# Patient Record
Sex: FEMALE | Race: BLACK OR AFRICAN AMERICAN | NOT HISPANIC OR LATINO | URBAN - METROPOLITAN AREA
[De-identification: names, ages, dates, MRNs, and addresses within clinical notes are randomized per-mention and may not be internally consistent; named-entity substitution may affect disease eponyms.]

---

## 2018-12-21 ENCOUNTER — EMERGENCY (EMERGENCY)
Facility: HOSPITAL | Age: 48
LOS: 1 days | Discharge: HOME | End: 2018-12-21
Attending: EMERGENCY MEDICINE | Admitting: EMERGENCY MEDICINE
Payer: COMMERCIAL

## 2018-12-21 VITALS
TEMPERATURE: 98 F | OXYGEN SATURATION: 98 % | WEIGHT: 235.01 LBS | RESPIRATION RATE: 18 BRPM | HEART RATE: 71 BPM | SYSTOLIC BLOOD PRESSURE: 106 MMHG | DIASTOLIC BLOOD PRESSURE: 74 MMHG

## 2018-12-21 VITALS
WEIGHT: 235.01 LBS | DIASTOLIC BLOOD PRESSURE: 100 MMHG | OXYGEN SATURATION: 95 % | HEART RATE: 68 BPM | RESPIRATION RATE: 18 BRPM | TEMPERATURE: 99 F | SYSTOLIC BLOOD PRESSURE: 148 MMHG

## 2018-12-21 VITALS
OXYGEN SATURATION: 97 % | DIASTOLIC BLOOD PRESSURE: 81 MMHG | SYSTOLIC BLOOD PRESSURE: 111 MMHG | RESPIRATION RATE: 17 BRPM | HEART RATE: 73 BPM

## 2018-12-21 DIAGNOSIS — R51 HEADACHE: ICD-10-CM

## 2018-12-21 DIAGNOSIS — Z88.0 ALLERGY STATUS TO PENICILLIN: ICD-10-CM

## 2018-12-21 DIAGNOSIS — Z88.2 ALLERGY STATUS TO SULFONAMIDES: ICD-10-CM

## 2018-12-21 DIAGNOSIS — R22.0 LOCALIZED SWELLING, MASS AND LUMP, HEAD: ICD-10-CM

## 2018-12-21 LAB
ALBUMIN SERPL ELPH-MCNC: 3.3 G/DL — LOW (ref 3.4–5)
ALP SERPL-CCNC: 57 U/L — SIGNIFICANT CHANGE UP (ref 40–120)
ALT FLD-CCNC: 20 U/L — SIGNIFICANT CHANGE UP (ref 12–42)
ANION GAP SERPL CALC-SCNC: 8 MMOL/L — LOW (ref 9–16)
APTT BLD: 26.8 SEC — LOW (ref 27.5–36.3)
AST SERPL-CCNC: 15 U/L — SIGNIFICANT CHANGE UP (ref 15–37)
BASOPHILS NFR BLD AUTO: 0.5 % — SIGNIFICANT CHANGE UP (ref 0–2)
BILIRUB SERPL-MCNC: 0.3 MG/DL — SIGNIFICANT CHANGE UP (ref 0.2–1.2)
BUN SERPL-MCNC: 18 MG/DL — SIGNIFICANT CHANGE UP (ref 7–23)
CALCIUM SERPL-MCNC: 9.6 MG/DL — SIGNIFICANT CHANGE UP (ref 8.5–10.5)
CHLORIDE SERPL-SCNC: 108 MMOL/L — SIGNIFICANT CHANGE UP (ref 96–108)
CO2 SERPL-SCNC: 25 MMOL/L — SIGNIFICANT CHANGE UP (ref 22–31)
CREAT SERPL-MCNC: 0.89 MG/DL — SIGNIFICANT CHANGE UP (ref 0.5–1.3)
EOSINOPHIL NFR BLD AUTO: 2.4 % — SIGNIFICANT CHANGE UP (ref 0–6)
GLUCOSE SERPL-MCNC: 106 MG/DL — HIGH (ref 70–99)
HCT VFR BLD CALC: 37.9 % — SIGNIFICANT CHANGE UP (ref 34.5–45)
HGB BLD-MCNC: 12.4 G/DL — SIGNIFICANT CHANGE UP (ref 11.5–15.5)
IMM GRANULOCYTES NFR BLD AUTO: 0.1 % — SIGNIFICANT CHANGE UP (ref 0–1.5)
INR BLD: 1.11 — SIGNIFICANT CHANGE UP (ref 0.88–1.16)
LYMPHOCYTES # BLD AUTO: 42.6 % — SIGNIFICANT CHANGE UP (ref 13–44)
MCHC RBC-ENTMCNC: 30.5 PG — SIGNIFICANT CHANGE UP (ref 27–34)
MCHC RBC-ENTMCNC: 32.7 G/DL — SIGNIFICANT CHANGE UP (ref 32–36)
MCV RBC AUTO: 93.1 FL — SIGNIFICANT CHANGE UP (ref 80–100)
MONOCYTES NFR BLD AUTO: 7.8 % — SIGNIFICANT CHANGE UP (ref 2–14)
NEUTROPHILS NFR BLD AUTO: 46.6 % — SIGNIFICANT CHANGE UP (ref 43–77)
PLATELET # BLD AUTO: 207 K/UL — SIGNIFICANT CHANGE UP (ref 150–400)
POTASSIUM SERPL-MCNC: 4.4 MMOL/L — SIGNIFICANT CHANGE UP (ref 3.5–5.3)
POTASSIUM SERPL-SCNC: 4.4 MMOL/L — SIGNIFICANT CHANGE UP (ref 3.5–5.3)
PROT SERPL-MCNC: 7.8 G/DL — SIGNIFICANT CHANGE UP (ref 6.4–8.2)
PROTHROM AB SERPL-ACNC: 12.2 SEC — SIGNIFICANT CHANGE UP (ref 10–12.9)
RBC # BLD: 4.07 M/UL — SIGNIFICANT CHANGE UP (ref 3.8–5.2)
RBC # FLD: 12.8 % — SIGNIFICANT CHANGE UP (ref 10.3–14.5)
SODIUM SERPL-SCNC: 141 MMOL/L — SIGNIFICANT CHANGE UP (ref 132–145)
WBC # BLD: 8 K/UL — SIGNIFICANT CHANGE UP (ref 3.8–10.5)
WBC # FLD AUTO: 8 K/UL — SIGNIFICANT CHANGE UP (ref 3.8–10.5)

## 2018-12-21 PROCEDURE — 99284 EMERGENCY DEPT VISIT MOD MDM: CPT

## 2018-12-21 PROCEDURE — 70450 CT HEAD/BRAIN W/O DYE: CPT | Mod: 26

## 2018-12-21 PROCEDURE — 71045 X-RAY EXAM CHEST 1 VIEW: CPT | Mod: 26

## 2018-12-21 NOTE — ED PROVIDER NOTE - OBJECTIVE STATEMENT
48 y.o F, otherwise healthy, presents to the ED with c/o intermittent ha x1 month. Pt states she does not have a history of headache prior to this incidence. Reports pain intensifies in the morning and becomes dull in the afternoon. Has taking Ibuprofen and Tylenol which did little to relieve sx. Reports an increase in stress due to job. Denies fever, N/V, blurry vision.

## 2018-12-21 NOTE — ED ADULT NURSE REASSESSMENT NOTE - NS ED NURSE REASSESS COMMENT FT1
received pt from RN Myerson. pt resting in stretcher comfortably, rates headache 3/10. MD smith at bedside explaining CT results, IV placed, labs sent. normal

## 2018-12-21 NOTE — ED ADULT TRIAGE NOTE - CHIEF COMPLAINT QUOTE
c/o generalized HA x 4 weeks with dizziness described as lightheadedness with nausea. worse in the morning. denies LOC, head injury. OTC meds help with sx.

## 2018-12-21 NOTE — ED ADULT NURSE NOTE - CHIEF COMPLAINT QUOTE
transfer from [facility] for neuro evaluation, pt having on and off headache for a month had CT with mass

## 2018-12-21 NOTE — ED ADULT NURSE NOTE - NSIMPLEMENTINTERV_GEN_ALL_ED
Implemented All Universal Safety Interventions:  [place] to call system. Call bell, personal items and telephone within reach. Instruct patient to call for assistance. Room bathroom lighting operational. Non-slip footwear when patient is off stretcher. Physically safe environment: no spills, clutter or unnecessary equipment. Stretcher in lowest position, wheels locked, appropriate side rails in place.

## 2018-12-21 NOTE — ED ADULT TRIAGE NOTE - CHIEF COMPLAINT QUOTE
transfer from [facility] for neuro evaluation, pt having on and off headache for a month had CT with mass

## 2018-12-22 ENCOUNTER — INPATIENT (INPATIENT)
Facility: HOSPITAL | Age: 48
LOS: 0 days | Discharge: HOME | DRG: 81 | End: 2018-12-23
Attending: NEUROLOGICAL SURGERY | Admitting: NEUROLOGICAL SURGERY
Payer: COMMERCIAL

## 2018-12-22 DIAGNOSIS — G93.9 DISORDER OF BRAIN, UNSPECIFIED: ICD-10-CM

## 2018-12-22 DIAGNOSIS — Z90.710 ACQUIRED ABSENCE OF BOTH CERVIX AND UTERUS: Chronic | ICD-10-CM

## 2018-12-22 LAB
ALBUMIN SERPL ELPH-MCNC: 3.4 G/DL — SIGNIFICANT CHANGE UP (ref 3.3–5)
ALP SERPL-CCNC: 45 U/L — SIGNIFICANT CHANGE UP (ref 40–120)
ALT FLD-CCNC: 12 U/L — SIGNIFICANT CHANGE UP (ref 10–45)
ANION GAP SERPL CALC-SCNC: 10 MMOL/L — SIGNIFICANT CHANGE UP (ref 5–17)
AST SERPL-CCNC: 13 U/L — SIGNIFICANT CHANGE UP (ref 10–40)
BILIRUB SERPL-MCNC: 0.4 MG/DL — SIGNIFICANT CHANGE UP (ref 0.2–1.2)
BUN SERPL-MCNC: 13 MG/DL — SIGNIFICANT CHANGE UP (ref 7–23)
CALCIUM SERPL-MCNC: 8.8 MG/DL — SIGNIFICANT CHANGE UP (ref 8.4–10.5)
CHLORIDE SERPL-SCNC: 108 MMOL/L — SIGNIFICANT CHANGE UP (ref 96–108)
CO2 SERPL-SCNC: 21 MMOL/L — LOW (ref 22–31)
CREAT SERPL-MCNC: 0.76 MG/DL — SIGNIFICANT CHANGE UP (ref 0.5–1.3)
GLUCOSE SERPL-MCNC: 120 MG/DL — HIGH (ref 70–99)
POTASSIUM SERPL-MCNC: 3.6 MMOL/L — SIGNIFICANT CHANGE UP (ref 3.5–5.3)
POTASSIUM SERPL-SCNC: 3.6 MMOL/L — SIGNIFICANT CHANGE UP (ref 3.5–5.3)
PROT SERPL-MCNC: 6.5 G/DL — SIGNIFICANT CHANGE UP (ref 6–8.3)
SODIUM SERPL-SCNC: 139 MMOL/L — SIGNIFICANT CHANGE UP (ref 135–145)

## 2018-12-22 PROCEDURE — 74177 CT ABD & PELVIS W/CONTRAST: CPT | Mod: 26

## 2018-12-22 PROCEDURE — 70553 MRI BRAIN STEM W/O & W/DYE: CPT | Mod: 26

## 2018-12-22 PROCEDURE — 99285 EMERGENCY DEPT VISIT HI MDM: CPT | Mod: 25

## 2018-12-22 PROCEDURE — 71260 CT THORAX DX C+: CPT | Mod: 26

## 2018-12-22 PROCEDURE — 99221 1ST HOSP IP/OBS SF/LOW 40: CPT

## 2018-12-22 PROCEDURE — 99253 IP/OBS CNSLTJ NEW/EST LOW 45: CPT

## 2018-12-22 RX ORDER — METOCLOPRAMIDE HCL 10 MG
10 TABLET ORAL ONCE
Qty: 0 | Refills: 0 | Status: COMPLETED | OUTPATIENT
Start: 2018-12-22 | End: 2018-12-22

## 2018-12-22 RX ORDER — INFLUENZA VIRUS VACCINE 15; 15; 15; 15 UG/.5ML; UG/.5ML; UG/.5ML; UG/.5ML
0.5 SUSPENSION INTRAMUSCULAR ONCE
Qty: 0 | Refills: 0 | Status: DISCONTINUED | OUTPATIENT
Start: 2018-12-22 | End: 2018-12-23

## 2018-12-22 RX ORDER — OXYCODONE AND ACETAMINOPHEN 5; 325 MG/1; MG/1
2 TABLET ORAL EVERY 6 HOURS
Qty: 0 | Refills: 0 | Status: DISCONTINUED | OUTPATIENT
Start: 2018-12-22 | End: 2018-12-23

## 2018-12-22 RX ORDER — ACETAMINOPHEN 500 MG
650 TABLET ORAL EVERY 6 HOURS
Qty: 0 | Refills: 0 | Status: DISCONTINUED | OUTPATIENT
Start: 2018-12-22 | End: 2018-12-23

## 2018-12-22 RX ORDER — ACETAMINOPHEN 500 MG
975 TABLET ORAL ONCE
Qty: 0 | Refills: 0 | Status: COMPLETED | OUTPATIENT
Start: 2018-12-22 | End: 2018-12-22

## 2018-12-22 RX ORDER — SENNA PLUS 8.6 MG/1
2 TABLET ORAL AT BEDTIME
Qty: 0 | Refills: 0 | Status: DISCONTINUED | OUTPATIENT
Start: 2018-12-22 | End: 2018-12-23

## 2018-12-22 RX ORDER — DOCUSATE SODIUM 100 MG
100 CAPSULE ORAL THREE TIMES A DAY
Qty: 0 | Refills: 0 | Status: DISCONTINUED | OUTPATIENT
Start: 2018-12-22 | End: 2018-12-23

## 2018-12-22 RX ORDER — OXYCODONE AND ACETAMINOPHEN 5; 325 MG/1; MG/1
1 TABLET ORAL EVERY 4 HOURS
Qty: 0 | Refills: 0 | Status: DISCONTINUED | OUTPATIENT
Start: 2018-12-22 | End: 2018-12-23

## 2018-12-22 RX ORDER — SODIUM CHLORIDE 9 MG/ML
1000 INJECTION INTRAMUSCULAR; INTRAVENOUS; SUBCUTANEOUS ONCE
Qty: 0 | Refills: 0 | Status: COMPLETED | OUTPATIENT
Start: 2018-12-22 | End: 2018-12-22

## 2018-12-22 RX ADMIN — Medication 975 MILLIGRAM(S): at 01:00

## 2018-12-22 RX ADMIN — OXYCODONE AND ACETAMINOPHEN 1 TABLET(S): 5; 325 TABLET ORAL at 23:05

## 2018-12-22 RX ADMIN — Medication 650 MILLIGRAM(S): at 14:58

## 2018-12-22 RX ADMIN — SODIUM CHLORIDE 2000 MILLILITER(S): 9 INJECTION INTRAMUSCULAR; INTRAVENOUS; SUBCUTANEOUS at 00:21

## 2018-12-22 RX ADMIN — Medication 650 MILLIGRAM(S): at 15:30

## 2018-12-22 RX ADMIN — OXYCODONE AND ACETAMINOPHEN 1 TABLET(S): 5; 325 TABLET ORAL at 22:48

## 2018-12-22 RX ADMIN — Medication 975 MILLIGRAM(S): at 00:20

## 2018-12-22 RX ADMIN — Medication 10 MILLIGRAM(S): at 00:21

## 2018-12-22 NOTE — ED PROVIDER NOTE - OBJECTIVE STATEMENT
47 y/o F w/no sig pmhx p/w generalized global HA w/waxing/waning blurry vision for several days/weeks, seen today at [facility] and referred to [facility] for neurosurgical evaluation and advanced imaging as head CT is concerning for mass/vasogenic edema. HPI consistent from earlier note: presents to the ED with c/o intermittent ha x1 month. Pt states she does not have a history of headache prior to this incidence. Reports pain intensifies in the morning and becomes dull in the afternoon. Has taking Ibuprofen and Tylenol which did little to relieve sx. Reports an increase in stress due to job. Denies fever, N/V, blurry vision.

## 2018-12-22 NOTE — H&P ADULT - NSHPPHYSICALEXAM_GEN_ALL_CORE
Neuro: Alert, oriented x3, NAD, fluent speech   CN II-XII intact   PERRL, EOMI, visual fields intact   Motor: strength 5/5 throughout   SILT   No pronator drift or dysmetria   Cardiac: regular rate   Resp: unlabored breathing on RA   GI: abd soft, NT, ND,   Vasc: no LE edema or calf tenderness

## 2018-12-22 NOTE — OCCUPATIONAL THERAPY INITIAL EVALUATION ADULT - STANDING BALANCE: DYNAMIC, REHAB EVAL
good balance/ambulated 100 feet independently without AD, steady no LOB patient reports body feeling "light"

## 2018-12-22 NOTE — PHYSICAL THERAPY INITIAL EVALUATION ADULT - THERAPY FREQUENCY, PT EVAL
DC PT - patient is independent for all functional mobility and ADLs with no external assistance needed. Patient demonstrates good dynamic stability for transfers ambulation, gait and stair negotiation without loss of balance. Patient with no focal neurological deficit on exam, however patient endorsed mildly decreased sensation to light touch (90% on L as opposed to 100% on right in terms of intensity) on L medial shin and L anteromedial forearm. Patient with normal visual tracking, however with noted 3 horizontal beats of nystagmus with end range left lateral gaze. Functionally patient is independent and has no skilled PT needs at this time. Patient is safe to return home when medically ready. No further skilled PT needs required at this time. DC PT

## 2018-12-22 NOTE — H&P ADULT - HISTORY OF PRESENT ILLNESS
48F no pmhx presented to [facility] ER this afternoon after having headache and vision changes x1 month. Pt reports symptoms come and go, are worse when she wakes up in the morning and are temporarily relieved with advil. She states shes has also had blurry vision and has been seeing floaters in her peripheral vision (she describes them seeing a bug fly at her from the side, but when she looks she does not see a bug). She denies recent trauma, numbness, tingling, dizziness, weakness, nausea/vomiting, memory or speech problems. She does not have a hx of migraines and does not take any medications on a regular basis. She also does not have a PCP that she sees on a regular basis. CTH was performed at [facility] demonstrating some bifrontal vasogenic edema R>L concerning for underlying mass, thus patient was transferred to [facility] for MRI and further workup.

## 2018-12-22 NOTE — OCCUPATIONAL THERAPY INITIAL EVALUATION ADULT - VISUAL ACUITY
denies visual changes at this time, reports on/off blurry vision with spots and floaters within bilateral peripheral vision

## 2018-12-22 NOTE — H&P ADULT - ASSESSMENT
48F no pmhx with HA and vision changes x1 month, CTH with new bifrontal vasogenic edema R>L concerning for underlying mass.

## 2018-12-22 NOTE — PHYSICAL THERAPY INITIAL EVALUATION ADULT - GENERAL OBSERVATIONS, REHAB EVAL
Received sitting at edge of bed with +tele, +pulse ox, on room air, Dr. Middleton present, OT (Amna) present, +hep lock, in no apparent distress. Patient appears to be in no apparent distress, resting comfortably.

## 2018-12-22 NOTE — CONSULT NOTE ADULT - ASSESSMENT
Given the lack of neurologic complaints (other than headache) or abnormalities on exam, lack of fever or alteration in mental status, the most likely etiology of MRI abnormalities is a diffuse glioma. The clinical picture is not consistent with encephalitis, thus lumbar puncture is likely low yield. If LP is sought prior to biopsy, would check cell count, protein, glucose, flow cytometry, culture, PCR panel, oligoclonal bands.
49 yo F with one month h/o HAs, visual changes with MRI findings in frontal and temporal lobes as well as corpus callosum most c/c low-grade glioma.  Her history has nothing to suggest HSV encephalitis, which is characterized by acute onset of fever and temporal lobe symptoms, AMS.  She is well appearing and completely intact.  Suggest:  - Would not LP for HSV PCR at this time  - Penicillin allergy skin testing - may be important information for her care in the near future.  Cannot be done if she receives steroids.  Please recall if further ID input is desired - ID service.

## 2018-12-22 NOTE — CONSULT NOTE ADULT - SUBJECTIVE AND OBJECTIVE BOX
49 yo W no known medical history went to the ER by her sister's urging for headache x 4 weeks. She denies fever, weakness, numbness, visual loss; she does have some vague visual disturbance in the periphery of her vision that comes and goes.     CT showed diffuse hypodensities in the b/l frontal, temporal white matter. MRI showed T2/FLAIR hyperintensity in b/l frontal, temporal, insular cortex and underlying white matter without contract enhancement or diffusion restriction.     Exam:  MS: awake, alert, oriented, speech fluent, follows commands well  CN: PERRL, EOMI, facial strength and sensation intact and symmetric, tongue and uvula midline  Motor: normal bulk and tone, full and symmetric strength throughout  Sensory: intact and symmetric to LT, PP b/l; graphesthesia intact  Reflexes: 2+ symmetric throughout  Coordination: no dysmetria on finger to nose or heel to shin  Gait: normal, Romberg negative    12-22    139  |  108  |  13  ----------------------------<  120<H>  3.6   |  21<L>  |  0.76    Ca    8.8      22 Dec 2018 05:48    TPro  6.5  /  Alb  3.4  /  TBili  0.4  /  DBili  x   /  AST  13  /  ALT  12  /  AlkPhos  45  12-22                          12.4   8.0   )-----------( 207      ( 21 Dec 2018 20:55 )             37.9

## 2018-12-22 NOTE — PHYSICAL THERAPY INITIAL EVALUATION ADULT - ADDITIONAL COMMENTS
Patient lives alone in an elevator apartment building with 4 steps with unilateral handrail to enter. Prior to admission, patient was independent for all functional mobility, ADLs and word duties with no assistive device. Denies history of falls. Wears eyeglasses at baseline

## 2018-12-22 NOTE — ED ADULT NURSE REASSESSMENT NOTE - NS ED NURSE REASSESS COMMENT FT1
Dr. Ferrer speaking with Neurosurgery regarding pt.
Neurosurgery at bedside speaking with pt.
report given to Phyllis MAGANA
report was called, t/p has been entered, awaiting t/p
interventions as noted, sanjay. well, assessment on-going

## 2018-12-22 NOTE — ED PROVIDER NOTE - MEDICAL DECISION MAKING DETAILS
HA modestly improved in ED though still present, seen and evaluated by neurosurgery, admitting for pain control and MRI.

## 2018-12-22 NOTE — PHYSICAL THERAPY INITIAL EVALUATION ADULT - MODALITIES TREATMENT COMMENTS
FIM locomotion/stairs: 7; Visual tracking with 3 horizontal beats nystagmus with end range tracking to left visual field; patient reports generalized headache and intermittent blurry vision with no triggers that she is aware of. Patient also states that she sees floaters in her peripheral vision bilaterally; -dysdiadochokinesia, Smile symmetrical; tongue protrusion midline, cheek puff symmetrical; eyebrow raise symmetrical; shoulder shrug symmetrical

## 2018-12-22 NOTE — OCCUPATIONAL THERAPY INITIAL EVALUATION ADULT - GENERAL OBSERVATIONS, REHAB EVAL
Right hand dominant. Patient cleared for Occupational Therapy by Neurosurgery JOYCE Salmon and CHINO Capone, patient received seated at EOB in non-acute distress with Dr. Middleton present, +tele, +IV heplock. Patient reports HA rated 2/10, 3/10 with activity.

## 2018-12-22 NOTE — ED PROVIDER NOTE - CARE PLAN
Principal Discharge DX:	Brain mass  Secondary Diagnosis:	Acute intractable headache, unspecified headache type

## 2018-12-22 NOTE — H&P ADULT - PROBLEM SELECTOR PLAN 1
- Admit to neurosurgery SDU under Dr. Garcia   - Obtain MRI brain with and without contrast   - Obtain CT C/A/P to rule out other malignancies   - Q2 neurochecks and vitals   - Pain control   - D/w Dr. Garcia

## 2018-12-22 NOTE — PHYSICAL THERAPY INITIAL EVALUATION ADULT - PERTINENT HX OF CURRENT PROBLEM, REHAB EVAL
Patient is a 48 year old F with no PMH who presented to [facility] ER this afternoon after having headache and vision changes x 1 month. Patient reports symptoms come and go, are worse when she wakes up in the morning and are temporarily relieved with Advil. She states shes has also had blurry vision and has been seeing floaters in her peripheral vision

## 2018-12-22 NOTE — PHYSICAL THERAPY INITIAL EVALUATION ADULT - MANUAL MUSCLE TESTING RESULTS, REHAB EVAL
Strength grossly 5/5 throughout based on formal manual muscle testing with exception of L knee flexion: 4+/5

## 2018-12-22 NOTE — H&P ADULT - NSHPLABSRESULTS_GEN_ALL_CORE
EXAM:  CT BRAIN                        PROCEDURE DATE:  12/21/2018    INTERPRETATION:  PROCEDURE: CT head without intravenous contrast  IMPRESSION: Nonspecific areas of hypodensity involving bilateral frontal   and temporal lobes, right greater than left with appearance favoring   vasogenic edema; however, areas of evolving acute/subacute infarction   should also be considered and cannot be entirely excluded. Recommend   correlation with any prior studies if available and further assessment   with brain MRI including postcontrast images. No acute intracranial   hemorrhage. Effacement of the suprasellar cistern is concerning for   impending herniation. No midline shift.

## 2018-12-22 NOTE — PHYSICAL THERAPY INITIAL EVALUATION ADULT - PATIENT/FAMILY/SIGNIFICANT OTHER GOALS STATEMENT, PT EVAL
Patient is willing and motivated to participate in physical therapy and does not feel as though she needs PT/OT and is annoyed at rehab's presence

## 2018-12-22 NOTE — ED PROVIDER NOTE - PHYSICAL EXAMINATION
VITAL SIGNS: I have reviewed nursing notes and confirm.  CONSTITUTIONAL: Well-developed; well-nourished; in no acute distress.  SKIN: Agree with RN documentation regarding decubitus evaluation. Remainder of skin exam is warm and dry, no acute rash.  HEAD: Normocephalic; atraumatic.  EYES: PERRL, EOM intact; conjunctiva and sclera clear.  ENT: No nasal discharge; airway clear.  NECK: Supple; non tender.  CARD: S1, S2 normal; no murmurs, gallops, or rubs. Regular rate and rhythm.  RESP: No wheezes, rales or rhonchi.  ABD: Normal bowel sounds; soft; non-distended; non-tender; no hepatosplenomegaly.  EXT: Normal ROM. No clubbing, cyanosis or edema.  LYMPH: No acute cervical adenopathy.  NEURO: Alert, oriented. Grossly unremarkable. CN 2-12 intact b/l, 5/5 strength all ext, no sensory deficits, speech intact  PSYCH: Cooperative, appropriate.

## 2018-12-22 NOTE — CONSULT NOTE ADULT - SUBJECTIVE AND OBJECTIVE BOX
HPI:  48F no pmhx presented to [facility] ER this afternoon after having headache and vision changes x1 month. Pt reports symptoms come and go, are worse when she wakes up in the morning and are temporarily relieved with advil. She states shes has also had blurry vision and has been seeing floaters in her peripheral vision (she describes them seeing a bug fly at her from the side, but when she looks she does not see a bug). She denies recent trauma, numbness, tingling, dizziness, weakness, nausea/vomiting, memory or speech problems. She does not have a hx of migraines and does not take any medications on a regular basis. She also does not have a PCP that she sees on a regular basis. CTH was performed at [facility] demonstrating some bifrontal vasogenic edema R>L concerning for underlying mass, thus patient was transferred to [facility] for MRI and further workup. (22 Dec 2018 03:13)      PAST MEDICAL & SURGICAL HISTORY:  No pertinent past medical history  H/O hysterectomy with unilateral oophorectomy          MEDICATIONS  (STANDING):  docusate sodium 100 milliGRAM(s) Oral three times a day  influenza   Vaccine 0.5 milliLiter(s) IntraMuscular once  senna 2 Tablet(s) Oral at bedtime    MEDICATIONS  (PRN):  acetaminophen   Tablet .. 650 milliGRAM(s) Oral every 6 hours PRN Temp greater or equal to 38C (100.4F), Mild Pain (1 - 3)  oxyCODONE    5 mG/acetaminophen 325 mG 1 Tablet(s) Oral every 4 hours PRN Moderate Pain (4 - 6)  oxyCODONE    5 mG/acetaminophen 325 mG 2 Tablet(s) Oral every 6 hours PRN Severe Pain (7 - 10)      Allergies    penicillins (Unknown)  sulfa drugs (Unknown)    Intolerances        SOCIAL HISTORY:    FAMILY HISTORY:  No pertinent family history in first degree relatives      Vital Signs Last 24 Hrs  T(C): 36.4 (22 Dec 2018 22:25), Max: 37.2 (22 Dec 2018 09:00)  T(F): 97.5 (22 Dec 2018 22:25), Max: 99 (22 Dec 2018 09:00)  HR: 68 (22 Dec 2018 20:28) (60 - 80)  BP: 109/63 (22 Dec 2018 20:28) (101/58 - 155/75)  BP(mean): 80 (22 Dec 2018 20:28) (75 - 88)  RR: 17 (22 Dec 2018 20:28) (16 - 18)  SpO2: 96% (22 Dec 2018 20:28) (95% - 99%)    PE:  WDWN in no distress  HEENT:  NC, PERRL, sclerae anicteric, conjunctivae clear, EOMI.  Sinuses nontender, no nasal exudate.  No buccal or pharyngeal lesions, erythema or exudate  Neck:  Supple, no adenopathy  Lungs:  Clear to auscultation  Cor:  RRR, S1, S2, no murmur appreciated  Abd:  Symmetric, normoactive BS.  Soft, nontender, no masses, guarding or rebound.  Liver and spleen not enlarged  Extrem:  No cyanosis or edema  Skin:  No rashes.    LABS:                        12.4   8.0   )-----------( 207      ( 21 Dec 2018 20:55 )             37.9     12    139  |  108  |  13  ----------------------------<  120<H>  3.6   |  21<L>  |  0.76    Ca    8.8      22 Dec 2018 05:48    TPro  6.5  /  Alb  3.4  /  TBili  0.4  /  DBili  x   /  AST  13  /  ALT  12  /  AlkPhos  45      Urinalysis Basic - ( 21 Dec 2018 19:14 )    Color: Yellow / Appearance: Clear / S.020 / pH: x  Gluc: x / Ketone: NEGATIVE  / Bili: NEGATIVE / Urobili: 0.2 E.U./dL   Blood: x / Protein: NEGATIVE mg/dL / Nitrite: NEGATIVE   Leuk Esterase: NEGATIVE / RBC: x / WBC 5-10 /HPF   Sq Epi: x / Non Sq Epi: Moderate /HPF / Bacteria: Present /HPF        RADIOLOGY & ADDITIONAL STUDIES: HPI:  48F no pmhx presented to [facility] ER this afternoon after having headache and vision changes x1 month. Pt reports symptoms come and go, are worse when she wakes up in the morning and are temporarily relieved with advil. She states shes has also had blurry vision and has been seeing floaters in her peripheral vision (she describes them seeing a bug fly at her from the side, but when she looks she does not see a bug). She denies recent trauma, numbness, tingling, dizziness, weakness, nausea/vomiting, memory or speech problems. She does not have a hx of migraines and does not take any medications on a regular basis. She also does not have a PCP that she sees on a regular basis. CTH was performed at [facility] demonstrating some bifrontal vasogenic edema R>L concerning for underlying mass, thus patient was transferred to [facility] for MRI and further workup. (22 Dec 2018 03:13)  She had MRI this pm, which showed extensive T1 and T2 prolongation without enhancement in bilateral frontal & temporal lobes and corpus callosum felt most compatible with diffuse low-grade glioma.  HSV PCR recommended if clinical concern for HSV infection - ID consult requested.  She has no h/o cold sores but does have intermittent ulcers of oral ulcers that she attributes to sensitivity to orange juice and peanuts.  She has no h/o genital ulcers.  She has had no fever, difficulty with mentation, changes in taste or smell or localized weakness.  She has no h/o seizures.      PAST MEDICAL & SURGICAL HISTORY:  No pertinent past medical history  H/O hysterectomy with unilateral oophorectomy  Prior STDs:  Chlamydia, trichomonas, BV - all while in college          MEDICATIONS  (STANDING):  docusate sodium 100 milliGRAM(s) Oral three times a day  influenza   Vaccine 0.5 milliLiter(s) IntraMuscular once  senna 2 Tablet(s) Oral at bedtime    MEDICATIONS  (PRN):  acetaminophen   Tablet .. 650 milliGRAM(s) Oral every 6 hours PRN Temp greater or equal to 38C (100.4F), Mild Pain (1 - 3)  oxyCODONE    5 mG/acetaminophen 325 mG 1 Tablet(s) Oral every 4 hours PRN Moderate Pain (4 - 6)  oxyCODONE    5 mG/acetaminophen 325 mG 2 Tablet(s) Oral every 6 hours PRN Severe Pain (7 - 10)      Allergies:  for one her throat closed 30 yrs ago, the other gave her a rash - she does not remember which did which    penicillins (Unknown)  sulfa drugs (Unknown)    Intolerances        SOCIAL HISTORY:  She is from [place], in [place] X 15 yrs.  Single, lives alone.  Works as an .  No pets or recent travel.    FAMILY HISTORY:  No pertinent family history in first degree relatives      Vital Signs Last 24 Hrs  T(C): 36.4 (22 Dec 2018 22:25), Max: 37.2 (22 Dec 2018 09:00)  T(F): 97.5 (22 Dec 2018 22:25), Max: 99 (22 Dec 2018 09:00)  HR: 68 (22 Dec 2018 20:28) (60 - 80)  BP: 109/63 (22 Dec 2018 20:28) (101/58 - 155/75)  BP(mean): 80 (22 Dec 2018 20:28) (75 - 88)  RR: 17 (22 Dec 2018 20:28) (16 - 18)  SpO2: 96% (22 Dec 2018 20:28) (95% - 99%)    PE:  WDWN in no distress  HEENT:  NC, PERRL, sclerae anicteric, conjunctivae clear, EOMI.  Sinuses nontender, no nasal exudate.  No buccal or pharyngeal lesions, erythema or exudate  Neck:  Supple, no adenopathy  Lungs:  Clear to auscultation  Cor:  RRR, S1, S2, no murmur appreciated  Abd:  Symmetric, normoactive BS.  Soft, nontender, no masses, guarding or rebound.  Liver and spleen not enlarged  Extrem:  No cyanosis or edema  Skin:  No rashes.  Neuro:  A & O X 4, CN II-XII intact.  Nl strength.  Downgoing toes bilaterally.    LABS:                        12.4   8.0   )-----------( 207      ( 21 Dec 2018 20:55 )             37.9     12-    139  |  108  |  13  ----------------------------<  120<H>  3.6   |  21<L>  |  0.76    Ca    8.8      22 Dec 2018 05:48    TPro  6.5  /  Alb  3.4  /  TBili  0.4  /  DBili  x   /  AST  13  /  ALT  12  /  AlkPhos  45  12-    Urinalysis Basic - ( 21 Dec 2018 19:14 )    Color: Yellow / Appearance: Clear / S.020 / pH: x  Gluc: x / Ketone: NEGATIVE  / Bili: NEGATIVE / Urobili: 0.2 E.U./dL   Blood: x / Protein: NEGATIVE mg/dL / Nitrite: NEGATIVE   Leuk Esterase: NEGATIVE / RBC: x / WBC 5-10 /HPF   Sq Epi: x / Non Sq Epi: Moderate /HPF / Bacteria: Present /HPF        RADIOLOGY & ADDITIONAL STUDIES:  < from: MR Head w/wo IV Cont (18 @ 09:55) >  Available for review are images from CT head dated 2018.    Extensive T1 and T2 prolongation is noted in the bilateral frontal and   temporal lobes and traversing the expanded corpus callosum. Involvement   of both gray and white matter is most evident in the anterior right   frontal lobe, where there is diffuse cortical thickening and signal   abnormality. No contrast enhancement is evident. There is no associated   restricted diffusion. Findings are felt most compatible with diffuse   low-grade glioma. While there is some overlap in imaging appearance, the   clinical course is felt incompatible with HSV infection.    There is effacement of the suprasellar cistern without levi   transtentorial herniation. There is no midline shift or hydrocephalus.   Posterior fossa structures are unremarkable in appearance. Normal flow is   apparent in the major cerebral vessels. There is no lesion of the skull   or skull base. The paranasal sinuses and mastoids are predominantly   ventilated, and the orbital contents are grossly unremarkable.    IMPRESSION:    There is extensive T1 and T2 prolongation without enhancement in the   bilateral frontal and temporal lobes and within the expanded corpus   callosum. Findings are felt most compatible with diffuse low-grade   glioma. Clinical course is atypical for HSV infection, though if there is   clinical concern, correlation with PCR is recommended.    < end of copied text >    < from: CT Abdomen and Pelvis w/ IV Cont (18 @ 01:40) >  INTERPRETATION:    Attending over read. Agree with the below report with following   modifications. Status post hysterectomy. The ovaries are not identified   likely status post bilateral oophorectomy. Cystic focus in the right   posterior pelvis measuring 2.5 x 2.7 x 3.4 cm could represent unopacified   loop of distal small bowel. The finding can be correlated with CT pelvis   using oral contrast material. This  study was performed without oral   contrast material. No pelvic adenopathy. Normal appendix.      [facility]  Preliminary Radiology Report   EXAM:  CT Chest With Contrast    EXAM DATE/TIME:  2018 1:20 AM    CLINICAL HISTORY:  48 years old, female; Signs and symptoms; Other: Headache; Other:   Abnormal CT head, vasogenic edema, looking for mass    TECHNIQUE:  Axial computed tomography images of the chest with intravenous contrast.  Coronal and sagittal reformatted images were created and reviewed.  MIP reconstructed images were created and reviewed.    COMPARISON:  No relevant prior studies available.    FINDINGS:  Lungs: Tracheobronchial tree is patent. The lungs are clear.  Pleural space: Normal. No pneumothorax. No pleural effusion.  Heart: Normal. No cardiomegaly. No pericardial effusion.  Pulmonary arteries: No definite evidence of any filling defects involving   the pulmonary arteries.  Aorta: Thoracic aorta unremarkable.  Lymph nodes: No mediastinal or hilar masses/adenopathy.  Bones/joints: Clavicles sternum right ribs, left ribs scapula a appear   intact. Bones nonacute.  Soft tissues: Unremarkable.    IMPRESSION:  CT scan of the chest appears nonacute.    EXAM:  CT Abdomen and Pelvis With Contrast    < end of copied text >

## 2018-12-22 NOTE — CONSULT NOTE ADULT - REASON FOR ADMISSION
Cerebral edema on CT, concern for underlying lesion
Cerebral edema on CT, concern for underlying lesion

## 2018-12-22 NOTE — PHYSICAL THERAPY INITIAL EVALUATION ADULT - COORDINATION ASSESSED, REHAB EVAL
WNL and intact bilaterally with no dysmetria noted/heel to shin/finger to nose finger to nose/WNL and intact bilaterally with no dysmetria noted/heel to shin

## 2018-12-23 ENCOUNTER — RESULT REVIEW (OUTPATIENT)
Age: 48
End: 2018-12-23

## 2018-12-23 ENCOUNTER — TRANSCRIPTION ENCOUNTER (OUTPATIENT)
Age: 48
End: 2018-12-23

## 2018-12-23 VITALS
HEART RATE: 78 BPM | RESPIRATION RATE: 17 BRPM | OXYGEN SATURATION: 100 % | DIASTOLIC BLOOD PRESSURE: 65 MMHG | SYSTOLIC BLOOD PRESSURE: 102 MMHG

## 2018-12-23 LAB
APPEARANCE CSF: CLEAR — SIGNIFICANT CHANGE UP
APPEARANCE SPUN FLD: COLORLESS — SIGNIFICANT CHANGE UP
COLOR CSF: SIGNIFICANT CHANGE UP
CSF PCR RESULT: SIGNIFICANT CHANGE UP
GLUCOSE CSF-MCNC: 76 MG/DL — HIGH (ref 40–70)
GRAM STN FLD: SIGNIFICANT CHANGE UP
NEUTROPHILS # CSF: 0 % — SIGNIFICANT CHANGE UP (ref 0–6)
NRBC NFR CSF: 0 /UL — SIGNIFICANT CHANGE UP (ref 0–5)
PROT CSF-MCNC: 14 MG/DL — LOW (ref 15–45)
RBC # CSF: 0 /UL — SIGNIFICANT CHANGE UP (ref 0–0)
SPECIMEN SOURCE: SIGNIFICANT CHANGE UP
TUBE TYPE: SIGNIFICANT CHANGE UP

## 2018-12-23 PROCEDURE — 96374 THER/PROPH/DIAG INJ IV PUSH: CPT | Mod: XU

## 2018-12-23 PROCEDURE — 89051 BODY FLUID CELL COUNT: CPT

## 2018-12-23 PROCEDURE — 88108 CYTOPATH CONCENTRATE TECH: CPT

## 2018-12-23 PROCEDURE — 87070 CULTURE OTHR SPECIMN AEROBIC: CPT

## 2018-12-23 PROCEDURE — 87205 SMEAR GRAM STAIN: CPT

## 2018-12-23 PROCEDURE — 84157 ASSAY OF PROTEIN OTHER: CPT

## 2018-12-23 PROCEDURE — 71260 CT THORAX DX C+: CPT

## 2018-12-23 PROCEDURE — 70553 MRI BRAIN STEM W/O & W/DYE: CPT

## 2018-12-23 PROCEDURE — 97162 PT EVAL MOD COMPLEX 30 MIN: CPT

## 2018-12-23 PROCEDURE — 82945 GLUCOSE OTHER FLUID: CPT

## 2018-12-23 PROCEDURE — 36415 COLL VENOUS BLD VENIPUNCTURE: CPT

## 2018-12-23 PROCEDURE — 74177 CT ABD & PELVIS W/CONTRAST: CPT

## 2018-12-23 PROCEDURE — A9585: CPT

## 2018-12-23 PROCEDURE — 97161 PT EVAL LOW COMPLEX 20 MIN: CPT

## 2018-12-23 PROCEDURE — 87483 CNS DNA AMP PROBE TYPE 12-25: CPT

## 2018-12-23 PROCEDURE — 80053 COMPREHEN METABOLIC PANEL: CPT

## 2018-12-23 PROCEDURE — 99285 EMERGENCY DEPT VISIT HI MDM: CPT | Mod: 25

## 2018-12-23 PROCEDURE — 83916 OLIGOCLONAL BANDS: CPT

## 2018-12-23 PROCEDURE — 99232 SBSQ HOSP IP/OBS MODERATE 35: CPT

## 2018-12-23 RX ORDER — LIDOCAINE HCL 20 MG/ML
10 VIAL (ML) INJECTION ONCE
Qty: 0 | Refills: 0 | Status: COMPLETED | OUTPATIENT
Start: 2018-12-23 | End: 2018-12-23

## 2018-12-23 RX ORDER — LEVETIRACETAM 250 MG/1
1 TABLET, FILM COATED ORAL
Qty: 30 | Refills: 1 | OUTPATIENT
Start: 2018-12-23 | End: 2019-02-20

## 2018-12-23 RX ORDER — FENTANYL CITRATE 50 UG/ML
50 INJECTION INTRAVENOUS ONCE
Qty: 0 | Refills: 0 | Status: DISCONTINUED | OUTPATIENT
Start: 2018-12-23 | End: 2018-12-23

## 2018-12-23 RX ADMIN — Medication 10 MILLILITER(S): at 09:36

## 2018-12-23 RX ADMIN — Medication 650 MILLIGRAM(S): at 13:20

## 2018-12-23 RX ADMIN — FENTANYL CITRATE 50 MICROGRAM(S): 50 INJECTION INTRAVENOUS at 09:12

## 2018-12-23 RX ADMIN — FENTANYL CITRATE 50 MICROGRAM(S): 50 INJECTION INTRAVENOUS at 09:30

## 2018-12-23 NOTE — DISCHARGE NOTE ADULT - PATIENT PORTAL LINK FT
You can access the Net [facility] Patient Portal, offered by [facility], by registering with the following website: [email or web address]

## 2018-12-23 NOTE — DISCHARGE NOTE ADULT - NS AS ACTIVITY OBS
Bathing allowed/Showering allowed/No Heavy lifting/straining/Walking-Indoors allowed/Stairs allowed/Walking-Outdoors allowed

## 2018-12-23 NOTE — DISCHARGE NOTE ADULT - HOSPITAL COURSE
48F no pmhx presented to [facility] ER this afternoon after having headache and vision changes x1 month. Pt reports symptoms come and go, are worse when she wakes up in the morning and are temporarily relieved with advil. She states shes has also had blurry vision and has been seeing floaters in her peripheral vision (she describes them seeing a bug fly at her from the side, but when she looks she does not see a bug). She denies recent trauma, numbness, tingling, dizziness, weakness, nausea/vomiting, memory or speech problems. She does not have a hx of migraines and does not take any medications on a regular basis. She also does not have a PCP that she sees on a regular basis. CTH was performed at [facility] demonstrating some bifrontal vasogenic edema R>L concerning for underlying mass, thus patient was transferred to [facility] for MRI and further workup.    Hospital Course:  HD 1: Admitted to SDU, consulted by ID and neurology   HD 2: LP performed successfully for sampling. Patient cleared for dc home instructed to return to clinic Thursday.

## 2018-12-23 NOTE — PROGRESS NOTE ADULT - ASSESSMENT
Preliminary LP results unremarkable  Likely glioma  Keppra 500 BID for seizure prophylaxis given right frontal cortical involvement  OK for d/c with outpatient planning for biopsy

## 2018-12-23 NOTE — PROGRESS NOTE ADULT - SUBJECTIVE AND OBJECTIVE BOX
Interval history: Feels well - no complaints. Had LP this morning, preliminary results unremarkable.     Exam:  MS: normal  CN: normal  Motor: 5/5 strength throughout, no pronator drift  Sensory: intact to LT throughout      Labs:                      12.4   8.0   )-----------( 207      ( 21 Dec 2018 20:55 )             37.9     139  |  108  |  13  ----------------------------<  120<H>  3.6   |  21<L>  |  0.76    Ca    8.8      22 Dec 2018 05:48    TPro  6.5  /  Alb  3.4  /  TBili  0.4  /  DBili  x   /  AST  13  /  ALT  12  /  AlkPhos  45  12-22

## 2018-12-23 NOTE — DISCHARGE NOTE ADULT - MEDICATION SUMMARY - MEDICATIONS TO TAKE
I will START or STAY ON the medications listed below when I get home from the hospital:  None I will START or STAY ON the medications listed below when I get home from the hospital:    Keppra 500 mg oral tablet  -- 1 tab(s) by mouth once a day   -- Check with your doctor before becoming pregnant.  It is very important that you take or use this exactly as directed.  Do not skip doses or discontinue unless directed by your doctor.  May cause drowsiness or dizziness.  Obtain medical advice before taking any non-prescription drugs as some may affect the action of this medication.  Swallow whole.  Do not crush.  This drug may impair the ability to drive or operate machinery.  Use care until you become familiar with its effects.    -- Indication: For BRAIN MASS

## 2018-12-23 NOTE — PROCEDURE NOTE - ADDITIONAL PROCEDURE DETAILS
Patient placed in sitting position, site prepped and draped in usual sterile fashion. Approximately 10cc of 1% lidocaine used for local infusion. Using anatomical landmarks, spinal needle was introduced to the L45 vetebral space. Clear CSF flow noted and easily drained for sampling. Approx 9cc of fluid sampled. Spinal needle removed, and bandage applied. no complications, patient tolerated procedure well with minimal pain.

## 2018-12-23 NOTE — DISCHARGE NOTE ADULT - CARE PLAN
Principal Discharge DX:	Acute intractable headache, unspecified headache type  Goal:	pain control  Assessment and plan of treatment:	follow up in clinic Thursday with Dr. Garcia

## 2018-12-23 NOTE — DISCHARGE NOTE ADULT - CARE PROVIDER_API CALL
Dao Garcia), Neurological Surgery  [address], [place] [facility]  Phone: [phone]  Fax: [phone]

## 2018-12-24 PROBLEM — Z00.00 ENCOUNTER FOR PREVENTIVE HEALTH EXAMINATION: Status: ACTIVE | Noted: 2018-12-24

## 2018-12-26 DIAGNOSIS — G93.6 CEREBRAL EDEMA: ICD-10-CM

## 2018-12-26 PROBLEM — H53.9 VISUAL DISTURBANCE: Status: ACTIVE | Noted: 2018-12-26

## 2018-12-26 LAB — NON-GYNECOLOGICAL CYTOLOGY STUDY: SIGNIFICANT CHANGE UP

## 2018-12-27 ENCOUNTER — APPOINTMENT (OUTPATIENT)
Dept: NEUROSURGERY | Facility: CLINIC | Age: 48
End: 2018-12-27
Payer: COMMERCIAL

## 2018-12-27 VITALS
WEIGHT: 235 LBS | HEART RATE: 84 BPM | TEMPERATURE: 98.4 F | RESPIRATION RATE: 16 BRPM | OXYGEN SATURATION: 99 % | BODY MASS INDEX: 34.8 KG/M2 | HEIGHT: 69 IN | DIASTOLIC BLOOD PRESSURE: 73 MMHG | SYSTOLIC BLOOD PRESSURE: 106 MMHG

## 2018-12-27 DIAGNOSIS — Z82.61 FAMILY HISTORY OF ARTHRITIS: ICD-10-CM

## 2018-12-27 DIAGNOSIS — H53.9 UNSPECIFIED VISUAL DISTURBANCE: ICD-10-CM

## 2018-12-27 DIAGNOSIS — Z78.9 OTHER SPECIFIED HEALTH STATUS: ICD-10-CM

## 2018-12-27 DIAGNOSIS — Z83.3 FAMILY HISTORY OF DIABETES MELLITUS: ICD-10-CM

## 2018-12-27 DIAGNOSIS — Z87.01 PERSONAL HISTORY OF PNEUMONIA (RECURRENT): ICD-10-CM

## 2018-12-27 DIAGNOSIS — G44.021 CHRONIC CLUSTER HEADACHE, INTRACTABLE: ICD-10-CM

## 2018-12-27 DIAGNOSIS — Z86.2 PERSONAL HISTORY OF DISEASES OF THE BLOOD AND BLOOD-FORMING ORGANS AND CERTAIN DISORDERS INVOLVING THE IMMUNE MECHANISM: ICD-10-CM

## 2018-12-27 LAB — OLIGOCLONAL BANDS CSF ELPH-IMP: SIGNIFICANT CHANGE UP

## 2018-12-27 PROCEDURE — 99215 OFFICE O/P EST HI 40 MIN: CPT

## 2018-12-31 PROBLEM — Z82.61 FAMILY HISTORY OF ARTHRITIS: Status: ACTIVE | Noted: 2018-12-28

## 2018-12-31 PROBLEM — Z78.9 SOCIAL ALCOHOL USE: Status: ACTIVE | Noted: 2018-12-28

## 2018-12-31 PROBLEM — Z83.3 FAMILY HISTORY OF DIABETES MELLITUS: Status: ACTIVE | Noted: 2018-12-28

## 2019-01-08 LAB
CULTURE RESULTS: NO GROWTH — SIGNIFICANT CHANGE UP
SPECIMEN SOURCE: SIGNIFICANT CHANGE UP

## 2019-01-09 PROBLEM — Z86.2 HISTORY OF BLEEDING DISORDER: Status: RESOLVED | Noted: 2018-12-28 | Resolved: 2019-01-09

## 2019-01-09 PROBLEM — Z87.01 HISTORY OF PNEUMONIA: Status: RESOLVED | Noted: 2018-12-28 | Resolved: 2019-01-09

## 2019-01-14 ENCOUNTER — OUTPATIENT (OUTPATIENT)
Dept: OUTPATIENT SERVICES | Facility: HOSPITAL | Age: 49
LOS: 1 days | End: 2019-01-14
Payer: COMMERCIAL

## 2019-01-14 ENCOUNTER — APPOINTMENT (OUTPATIENT)
Dept: SURGERY | Facility: CLINIC | Age: 49
End: 2019-01-14

## 2019-01-14 ENCOUNTER — TRANSCRIPTION ENCOUNTER (OUTPATIENT)
Age: 49
End: 2019-01-14

## 2019-01-14 VITALS
HEART RATE: 68 BPM | WEIGHT: 241.63 LBS | HEIGHT: 69 IN | RESPIRATION RATE: 14 BRPM | DIASTOLIC BLOOD PRESSURE: 69 MMHG | TEMPERATURE: 98 F | OXYGEN SATURATION: 100 % | SYSTOLIC BLOOD PRESSURE: 108 MMHG

## 2019-01-14 DIAGNOSIS — Z01.818 ENCOUNTER FOR OTHER PREPROCEDURAL EXAMINATION: ICD-10-CM

## 2019-01-14 DIAGNOSIS — Z90.710 ACQUIRED ABSENCE OF BOTH CERVIX AND UTERUS: Chronic | ICD-10-CM

## 2019-01-14 DIAGNOSIS — G93.9 DISORDER OF BRAIN, UNSPECIFIED: ICD-10-CM

## 2019-01-14 LAB
ALBUMIN SERPL ELPH-MCNC: 3.9 G/DL — SIGNIFICANT CHANGE UP (ref 3.3–5)
ALP SERPL-CCNC: 54 U/L — SIGNIFICANT CHANGE UP (ref 40–120)
ALT FLD-CCNC: 163 U/L — HIGH (ref 10–45)
ANION GAP SERPL CALC-SCNC: 15 MMOL/L — SIGNIFICANT CHANGE UP (ref 5–17)
APTT BLD: 27.9 SEC — SIGNIFICANT CHANGE UP (ref 27.5–36.3)
AST SERPL-CCNC: 69 U/L — HIGH (ref 10–40)
BILIRUB SERPL-MCNC: 0.2 MG/DL — SIGNIFICANT CHANGE UP (ref 0.2–1.2)
BUN SERPL-MCNC: 12 MG/DL — SIGNIFICANT CHANGE UP (ref 7–23)
CALCIUM SERPL-MCNC: 9.7 MG/DL — SIGNIFICANT CHANGE UP (ref 8.4–10.5)
CHLORIDE SERPL-SCNC: 102 MMOL/L — SIGNIFICANT CHANGE UP (ref 96–108)
CO2 SERPL-SCNC: 23 MMOL/L — SIGNIFICANT CHANGE UP (ref 22–31)
CREAT SERPL-MCNC: 0.75 MG/DL — SIGNIFICANT CHANGE UP (ref 0.5–1.3)
GLUCOSE SERPL-MCNC: 80 MG/DL — SIGNIFICANT CHANGE UP (ref 70–99)
HCG SERPL-ACNC: 2.1 MIU/ML — SIGNIFICANT CHANGE UP
HCT VFR BLD CALC: 37.1 % — SIGNIFICANT CHANGE UP (ref 34.5–45)
HGB BLD-MCNC: 12 G/DL — SIGNIFICANT CHANGE UP (ref 11.5–15.5)
INR BLD: 1.11 — SIGNIFICANT CHANGE UP (ref 0.88–1.16)
MCHC RBC-ENTMCNC: 30.2 PG — SIGNIFICANT CHANGE UP (ref 27–34)
MCHC RBC-ENTMCNC: 32.3 G/DL — SIGNIFICANT CHANGE UP (ref 32–36)
MCV RBC AUTO: 93.5 FL — SIGNIFICANT CHANGE UP (ref 80–100)
PLATELET # BLD AUTO: 254 K/UL — SIGNIFICANT CHANGE UP (ref 150–400)
POTASSIUM SERPL-MCNC: 4.2 MMOL/L — SIGNIFICANT CHANGE UP (ref 3.5–5.3)
POTASSIUM SERPL-SCNC: 4.2 MMOL/L — SIGNIFICANT CHANGE UP (ref 3.5–5.3)
PROT SERPL-MCNC: 7.8 G/DL — SIGNIFICANT CHANGE UP (ref 6–8.3)
PROTHROM AB SERPL-ACNC: 12.6 SEC — SIGNIFICANT CHANGE UP (ref 10–12.9)
RBC # BLD: 3.97 M/UL — SIGNIFICANT CHANGE UP (ref 3.8–5.2)
RBC # FLD: 12.9 % — SIGNIFICANT CHANGE UP (ref 10.3–16.9)
SODIUM SERPL-SCNC: 140 MMOL/L — SIGNIFICANT CHANGE UP (ref 135–145)
WBC # BLD: 6.3 K/UL — SIGNIFICANT CHANGE UP (ref 3.8–10.5)
WBC # FLD AUTO: 6.3 K/UL — SIGNIFICANT CHANGE UP (ref 3.8–10.5)

## 2019-01-14 PROCEDURE — 93010 ELECTROCARDIOGRAM REPORT: CPT | Mod: NC

## 2019-01-14 NOTE — H&P PST ADULT - HISTORY OF PRESENT ILLNESS
48 year old male with history of brain mass. presents for history and physical examination prior to right frontal brain biopsy with navigation. 48 year old male with history of headache over six weeks.  MRI confirmed brain mass. Presents for history and physical examination prior to right frontal brain biopsy with navigation. 48 year old Female with history of headache over six weeks. Patient taking Tylenol 650mg every four hour manage her headache.  MRI of her head confirmed brain mass. Presents for history and physical examination prior to right frontal brain biopsy with navigation.

## 2019-01-14 NOTE — H&P PST ADULT - PROBLEM SELECTOR PLAN 1
Preop testing results reviewed. 1/15/2019 9 am Case discussed with Dr. Foley abnormal LFT result. preop medical consult requested.  Lab result and ekg faxed to Dr. Dao Garcia office and I spoke to surgical coordinator Ms. Shelley Guadarrama that patient need preop medical clearance as per Dr. Foley.  PENDING MEDICAL CLEARANCE.

## 2019-01-14 NOTE — H&P PST ADULT - NSANTHOSAYNRD_GEN_A_CORE
No. CHING screening performed.  STOP BANG Legend: 0-2 = LOW Risk; 3-4 = INTERMEDIATE Risk; 5-8 = HIGH Risk

## 2019-01-15 ENCOUNTER — MEDICATION RENEWAL (OUTPATIENT)
Age: 49
End: 2019-01-15

## 2019-01-15 DIAGNOSIS — Z01.818 ENCOUNTER FOR OTHER PREPROCEDURAL EXAMINATION: ICD-10-CM

## 2019-01-15 DIAGNOSIS — R51 HEADACHE: ICD-10-CM

## 2019-01-17 ENCOUNTER — MEDICATION RENEWAL (OUTPATIENT)
Age: 49
End: 2019-01-17

## 2019-01-17 LAB
ALBUMIN SERPL ELPH-MCNC: 4.3 G/DL
ALP BLD-CCNC: 55 U/L
ALT SERPL-CCNC: 169 U/L
ANION GAP SERPL CALC-SCNC: 13 MMOL/L
AST SERPL-CCNC: 78 U/L
BILIRUB SERPL-MCNC: 0.3 MG/DL
BUN SERPL-MCNC: 16 MG/DL
CALCIUM SERPL-MCNC: 9.8 MG/DL
CHLORIDE SERPL-SCNC: 106 MMOL/L
CO2 SERPL-SCNC: 24 MMOL/L
CREAT SERPL-MCNC: 0.71 MG/DL
GLUCOSE SERPL-MCNC: 107 MG/DL
POTASSIUM SERPL-SCNC: 4 MMOL/L
PROT SERPL-MCNC: 7.2 G/DL
SODIUM SERPL-SCNC: 143 MMOL/L

## 2019-01-17 RX ORDER — INFLUENZA VIRUS VACCINE 15; 15; 15; 15 UG/.5ML; UG/.5ML; UG/.5ML; UG/.5ML
0.5 SUSPENSION INTRAMUSCULAR ONCE
Qty: 0 | Refills: 0 | Status: DISCONTINUED | OUTPATIENT
Start: 2019-02-01 | End: 2019-02-05

## 2019-01-18 PROBLEM — D21.9 BENIGN NEOPLASM OF CONNECTIVE AND OTHER SOFT TISSUE, UNSPECIFIED: Chronic | Status: ACTIVE | Noted: 2019-01-14

## 2019-01-21 ENCOUNTER — MEDICATION RENEWAL (OUTPATIENT)
Age: 49
End: 2019-01-21

## 2019-01-22 ENCOUNTER — APPOINTMENT (OUTPATIENT)
Dept: GASTROENTEROLOGY | Facility: CLINIC | Age: 49
End: 2019-01-22
Payer: COMMERCIAL

## 2019-01-22 ENCOUNTER — OTHER (OUTPATIENT)
Age: 49
End: 2019-01-22

## 2019-01-22 VITALS
SYSTOLIC BLOOD PRESSURE: 100 MMHG | OXYGEN SATURATION: 99 % | TEMPERATURE: 98.3 F | BODY MASS INDEX: 35.74 KG/M2 | RESPIRATION RATE: 14 BRPM | DIASTOLIC BLOOD PRESSURE: 70 MMHG | HEART RATE: 78 BPM | WEIGHT: 242 LBS

## 2019-01-22 PROCEDURE — 36415 COLL VENOUS BLD VENIPUNCTURE: CPT

## 2019-01-22 PROCEDURE — 99204 OFFICE O/P NEW MOD 45 MIN: CPT | Mod: 25

## 2019-01-23 ENCOUNTER — MEDICATION RENEWAL (OUTPATIENT)
Age: 49
End: 2019-01-23

## 2019-01-23 ENCOUNTER — RESULT REVIEW (OUTPATIENT)
Age: 49
End: 2019-01-23

## 2019-01-23 ENCOUNTER — OUTPATIENT (OUTPATIENT)
Dept: OUTPATIENT SERVICES | Facility: HOSPITAL | Age: 49
LOS: 1 days | End: 2019-01-23
Payer: COMMERCIAL

## 2019-01-23 ENCOUNTER — APPOINTMENT (OUTPATIENT)
Dept: ULTRASOUND IMAGING | Facility: HOSPITAL | Age: 49
End: 2019-01-23
Payer: COMMERCIAL

## 2019-01-23 DIAGNOSIS — Z90.710 ACQUIRED ABSENCE OF BOTH CERVIX AND UTERUS: Chronic | ICD-10-CM

## 2019-01-23 PROCEDURE — 76700 US EXAM ABDOM COMPLETE: CPT | Mod: 26

## 2019-01-23 PROCEDURE — 76700 US EXAM ABDOM COMPLETE: CPT

## 2019-01-23 NOTE — ASSESSMENT
[FreeTextEntry1] : 48F with newly diagnosed brain mass presents for abnormal liver test likely acute DILI due to tylenol use but will evaluate for viral hepatitis and US for acute patholoy\par - repeat liver tests today\par - US STAT\par - avoid ETOH, OTCs, hepatotoxins, supplements/herbs\par - if liver tests downtrending and US normal- proceed with biopsy

## 2019-01-23 NOTE — HISTORY OF PRESENT ILLNESS
[de-identified] : 48F with no significant PMHx presents for abnormal liver tests. Pt presented to [facility] 12/21 for headache and found on imaging to have brain mass, LP performed and plan for biopsy. But while working up for biopsy found to have abnormal liver tests.Dec 21st - nml liver tests and Jan 14th liver test elevated first time ever. Left hospital 12/23- taking keppra, xs tylenol 2 every 6hours x2wk then switched to regular tylenol  x2 wks and stopped 1/17 and started motrin. Tylenol total 4g daily x2weeks. No ETOH since 12/17. \par No abdominal symptoms\par ETOH Previously 2-3x/week. No smoking/drugs. \par FHX- sister with HepC but no cirrrhosis or other liver disease

## 2019-01-23 NOTE — PHYSICAL EXAM
[General Appearance - Alert] : alert [General Appearance - In No Acute Distress] : in no acute distress [Sclera] : the sclera and conjunctiva were normal [Outer Ear] : the ears and nose were normal in appearance [Neck Appearance] : the appearance of the neck was normal [Heart Rate And Rhythm] : heart rate was normal and rhythm regular [Edema] : there was no peripheral edema [Bowel Sounds] : normal bowel sounds [Abdomen Soft] : soft [Abdomen Tenderness] : non-tender [Abdomen Mass (___ Cm)] : no abdominal mass palpated [No CVA Tenderness] : no ~M costovertebral angle tenderness [Abnormal Walk] : normal gait [] : no rash [No Focal Deficits] : no focal deficits [Impaired Insight] : insight and judgment were intact

## 2019-01-31 VITALS
WEIGHT: 240.97 LBS | HEIGHT: 69 IN | HEART RATE: 78 BPM | TEMPERATURE: 98 F | RESPIRATION RATE: 18 BRPM | DIASTOLIC BLOOD PRESSURE: 66 MMHG | OXYGEN SATURATION: 100 % | SYSTOLIC BLOOD PRESSURE: 109 MMHG

## 2019-01-31 RX ORDER — DOCUSATE SODIUM 100 MG
0 CAPSULE ORAL
Qty: 0 | Refills: 0 | COMMUNITY

## 2019-01-31 RX ORDER — ACETAMINOPHEN 500 MG
2 TABLET ORAL
Qty: 0 | Refills: 0 | COMMUNITY

## 2019-02-01 ENCOUNTER — RESULT REVIEW (OUTPATIENT)
Age: 49
End: 2019-02-01

## 2019-02-01 ENCOUNTER — APPOINTMENT (OUTPATIENT)
Dept: NEUROSURGERY | Facility: HOSPITAL | Age: 49
End: 2019-02-01

## 2019-02-01 ENCOUNTER — INPATIENT (INPATIENT)
Facility: HOSPITAL | Age: 49
LOS: 3 days | Discharge: HOME | DRG: 25 | End: 2019-02-05
Attending: NEUROLOGICAL SURGERY | Admitting: NEUROLOGICAL SURGERY
Payer: COMMERCIAL

## 2019-02-01 DIAGNOSIS — Z90.710 ACQUIRED ABSENCE OF BOTH CERVIX AND UTERUS: Chronic | ICD-10-CM

## 2019-02-01 LAB
ALBUMIN SERPL ELPH-MCNC: 3.6 G/DL — SIGNIFICANT CHANGE UP (ref 3.3–5)
ALP SERPL-CCNC: 56 U/L — SIGNIFICANT CHANGE UP (ref 40–120)
ALT FLD-CCNC: 25 U/L — SIGNIFICANT CHANGE UP (ref 10–45)
ANION GAP SERPL CALC-SCNC: 10 MMOL/L — SIGNIFICANT CHANGE UP (ref 5–17)
APTT BLD: 24.6 SEC — LOW (ref 27.5–36.3)
AST SERPL-CCNC: 17 U/L — SIGNIFICANT CHANGE UP (ref 10–40)
BASOPHILS NFR BLD AUTO: 0.4 % — SIGNIFICANT CHANGE UP (ref 0–2)
BILIRUB SERPL-MCNC: 0.2 MG/DL — SIGNIFICANT CHANGE UP (ref 0.2–1.2)
BUN SERPL-MCNC: 12 MG/DL — SIGNIFICANT CHANGE UP (ref 7–23)
CALCIUM SERPL-MCNC: 9.4 MG/DL — SIGNIFICANT CHANGE UP (ref 8.4–10.5)
CHLORIDE SERPL-SCNC: 108 MMOL/L — SIGNIFICANT CHANGE UP (ref 96–108)
CO2 SERPL-SCNC: 24 MMOL/L — SIGNIFICANT CHANGE UP (ref 22–31)
CREAT SERPL-MCNC: 0.74 MG/DL — SIGNIFICANT CHANGE UP (ref 0.5–1.3)
EOSINOPHIL NFR BLD AUTO: 0.4 % — SIGNIFICANT CHANGE UP (ref 0–6)
GLUCOSE BLDC GLUCOMTR-MCNC: 105 MG/DL — HIGH (ref 70–99)
GLUCOSE BLDC GLUCOMTR-MCNC: 165 MG/DL — HIGH (ref 70–99)
GLUCOSE BLDC GLUCOMTR-MCNC: 98 MG/DL — SIGNIFICANT CHANGE UP (ref 70–99)
GLUCOSE SERPL-MCNC: 120 MG/DL — HIGH (ref 70–99)
HCT VFR BLD CALC: 32.9 % — LOW (ref 34.5–45)
HGB BLD-MCNC: 11 G/DL — LOW (ref 11.5–15.5)
INR BLD: 1.15 — SIGNIFICANT CHANGE UP (ref 0.88–1.16)
LYMPHOCYTES # BLD AUTO: 39.1 % — SIGNIFICANT CHANGE UP (ref 13–44)
MCHC RBC-ENTMCNC: 29.9 PG — SIGNIFICANT CHANGE UP (ref 27–34)
MCHC RBC-ENTMCNC: 33.4 G/DL — SIGNIFICANT CHANGE UP (ref 32–36)
MCV RBC AUTO: 89.4 FL — SIGNIFICANT CHANGE UP (ref 80–100)
MONOCYTES NFR BLD AUTO: 4.6 % — SIGNIFICANT CHANGE UP (ref 2–14)
NEUTROPHILS NFR BLD AUTO: 55.5 % — SIGNIFICANT CHANGE UP (ref 43–77)
PLATELET # BLD AUTO: 189 K/UL — SIGNIFICANT CHANGE UP (ref 150–400)
POTASSIUM SERPL-MCNC: 4.2 MMOL/L — SIGNIFICANT CHANGE UP (ref 3.5–5.3)
POTASSIUM SERPL-SCNC: 4.2 MMOL/L — SIGNIFICANT CHANGE UP (ref 3.5–5.3)
PROT SERPL-MCNC: 7.1 G/DL — SIGNIFICANT CHANGE UP (ref 6–8.3)
PROTHROM AB SERPL-ACNC: 13 SEC — HIGH (ref 10–12.9)
RBC # BLD: 3.68 M/UL — LOW (ref 3.8–5.2)
RBC # FLD: 12.8 % — SIGNIFICANT CHANGE UP (ref 10.3–16.9)
SODIUM SERPL-SCNC: 142 MMOL/L — SIGNIFICANT CHANGE UP (ref 135–145)
WBC # BLD: 5.4 K/UL — SIGNIFICANT CHANGE UP (ref 3.8–10.5)
WBC # FLD AUTO: 5.4 K/UL — SIGNIFICANT CHANGE UP (ref 3.8–10.5)

## 2019-02-01 PROCEDURE — 70552 MRI BRAIN STEM W/DYE: CPT | Mod: 26

## 2019-02-01 PROCEDURE — 99291 CRITICAL CARE FIRST HOUR: CPT | Mod: 24

## 2019-02-01 PROCEDURE — 61781 SCAN PROC CRANIAL INTRA: CPT

## 2019-02-01 PROCEDURE — 69990 MICROSURGERY ADD-ON: CPT | Mod: 59

## 2019-02-01 PROCEDURE — 61510 CRNEC TREPH EXC BRN TUM STTL: CPT

## 2019-02-01 RX ORDER — OXYCODONE AND ACETAMINOPHEN 5; 325 MG/1; MG/1
2 TABLET ORAL EVERY 6 HOURS
Qty: 0 | Refills: 0 | Status: DISCONTINUED | OUTPATIENT
Start: 2019-02-01 | End: 2019-02-01

## 2019-02-01 RX ORDER — OXYCODONE HYDROCHLORIDE 5 MG/1
5 TABLET ORAL EVERY 4 HOURS
Qty: 0 | Refills: 0 | Status: DISCONTINUED | OUTPATIENT
Start: 2019-02-01 | End: 2019-02-05

## 2019-02-01 RX ORDER — DEXTROSE 50 % IN WATER 50 %
25 SYRINGE (ML) INTRAVENOUS ONCE
Qty: 0 | Refills: 0 | Status: DISCONTINUED | OUTPATIENT
Start: 2019-02-01 | End: 2019-02-05

## 2019-02-01 RX ORDER — PANTOPRAZOLE SODIUM 20 MG/1
40 TABLET, DELAYED RELEASE ORAL
Qty: 0 | Refills: 0 | Status: DISCONTINUED | OUTPATIENT
Start: 2019-02-02 | End: 2019-02-05

## 2019-02-01 RX ORDER — GLUCAGON INJECTION, SOLUTION 0.5 MG/.1ML
1 INJECTION, SOLUTION SUBCUTANEOUS ONCE
Qty: 0 | Refills: 0 | Status: DISCONTINUED | OUTPATIENT
Start: 2019-02-01 | End: 2019-02-05

## 2019-02-01 RX ORDER — INSULIN LISPRO 100/ML
VIAL (ML) SUBCUTANEOUS
Qty: 0 | Refills: 0 | Status: DISCONTINUED | OUTPATIENT
Start: 2019-02-01 | End: 2019-02-05

## 2019-02-01 RX ORDER — SODIUM CHLORIDE 9 MG/ML
1000 INJECTION, SOLUTION INTRAVENOUS
Qty: 0 | Refills: 0 | Status: DISCONTINUED | OUTPATIENT
Start: 2019-02-01 | End: 2019-02-03

## 2019-02-01 RX ORDER — PANTOPRAZOLE SODIUM 20 MG/1
40 TABLET, DELAYED RELEASE ORAL
Qty: 0 | Refills: 0 | Status: DISCONTINUED | OUTPATIENT
Start: 2019-02-01 | End: 2019-02-01

## 2019-02-01 RX ORDER — OXYCODONE AND ACETAMINOPHEN 5; 325 MG/1; MG/1
1 TABLET ORAL EVERY 4 HOURS
Qty: 0 | Refills: 0 | Status: DISCONTINUED | OUTPATIENT
Start: 2019-02-01 | End: 2019-02-01

## 2019-02-01 RX ORDER — DEXTROSE 50 % IN WATER 50 %
15 SYRINGE (ML) INTRAVENOUS ONCE
Qty: 0 | Refills: 0 | Status: DISCONTINUED | OUTPATIENT
Start: 2019-02-01 | End: 2019-02-05

## 2019-02-01 RX ORDER — VANCOMYCIN HCL 1 G
1500 VIAL (EA) INTRAVENOUS ONCE
Qty: 0 | Refills: 0 | Status: COMPLETED | OUTPATIENT
Start: 2019-02-01 | End: 2019-02-01

## 2019-02-01 RX ORDER — ONDANSETRON 8 MG/1
4 TABLET, FILM COATED ORAL EVERY 6 HOURS
Qty: 0 | Refills: 0 | Status: DISCONTINUED | OUTPATIENT
Start: 2019-02-01 | End: 2019-02-05

## 2019-02-01 RX ORDER — HYDRALAZINE HCL 50 MG
10 TABLET ORAL
Qty: 0 | Refills: 0 | Status: DISCONTINUED | OUTPATIENT
Start: 2019-02-01 | End: 2019-02-05

## 2019-02-01 RX ORDER — FENTANYL CITRATE 50 UG/ML
12.5 INJECTION INTRAVENOUS
Qty: 0 | Refills: 0 | Status: DISCONTINUED | OUTPATIENT
Start: 2019-02-01 | End: 2019-02-02

## 2019-02-01 RX ORDER — DEXTROSE 50 % IN WATER 50 %
12.5 SYRINGE (ML) INTRAVENOUS ONCE
Qty: 0 | Refills: 0 | Status: DISCONTINUED | OUTPATIENT
Start: 2019-02-01 | End: 2019-02-05

## 2019-02-01 RX ORDER — SODIUM CHLORIDE 9 MG/ML
1000 INJECTION INTRAMUSCULAR; INTRAVENOUS; SUBCUTANEOUS
Qty: 0 | Refills: 0 | Status: DISCONTINUED | OUTPATIENT
Start: 2019-02-01 | End: 2019-02-02

## 2019-02-01 RX ORDER — DEXAMETHASONE 0.5 MG/5ML
4 ELIXIR ORAL EVERY 6 HOURS
Qty: 0 | Refills: 0 | Status: DISCONTINUED | OUTPATIENT
Start: 2019-02-01 | End: 2019-02-03

## 2019-02-01 RX ORDER — MORPHINE SULFATE 50 MG/1
2 CAPSULE, EXTENDED RELEASE ORAL EVERY 4 HOURS
Qty: 0 | Refills: 0 | Status: DISCONTINUED | OUTPATIENT
Start: 2019-02-01 | End: 2019-02-01

## 2019-02-01 RX ORDER — LEVETIRACETAM 250 MG/1
500 TABLET, FILM COATED ORAL EVERY 12 HOURS
Qty: 0 | Refills: 0 | Status: DISCONTINUED | OUTPATIENT
Start: 2019-02-01 | End: 2019-02-02

## 2019-02-01 RX ORDER — MORPHINE SULFATE 50 MG/1
2 CAPSULE, EXTENDED RELEASE ORAL ONCE
Qty: 0 | Refills: 0 | Status: DISCONTINUED | OUTPATIENT
Start: 2019-02-01 | End: 2019-02-01

## 2019-02-01 RX ORDER — LABETALOL HCL 100 MG
10 TABLET ORAL
Qty: 0 | Refills: 0 | Status: DISCONTINUED | OUTPATIENT
Start: 2019-02-01 | End: 2019-02-01

## 2019-02-01 RX ORDER — DOCUSATE SODIUM 100 MG
100 CAPSULE ORAL DAILY
Qty: 0 | Refills: 0 | Status: DISCONTINUED | OUTPATIENT
Start: 2019-02-01 | End: 2019-02-05

## 2019-02-01 RX ADMIN — MORPHINE SULFATE 2 MILLIGRAM(S): 50 CAPSULE, EXTENDED RELEASE ORAL at 14:00

## 2019-02-01 RX ADMIN — LEVETIRACETAM 420 MILLIGRAM(S): 250 TABLET, FILM COATED ORAL at 17:16

## 2019-02-01 RX ADMIN — OXYCODONE AND ACETAMINOPHEN 1 TABLET(S): 5; 325 TABLET ORAL at 18:17

## 2019-02-01 RX ADMIN — Medication 4 MILLIGRAM(S): at 18:28

## 2019-02-01 RX ADMIN — OXYCODONE AND ACETAMINOPHEN 1 TABLET(S): 5; 325 TABLET ORAL at 17:30

## 2019-02-01 RX ADMIN — MORPHINE SULFATE 2 MILLIGRAM(S): 50 CAPSULE, EXTENDED RELEASE ORAL at 12:30

## 2019-02-01 RX ADMIN — OXYCODONE HYDROCHLORIDE 5 MILLIGRAM(S): 5 TABLET ORAL at 23:52

## 2019-02-01 RX ADMIN — Medication 2: at 21:40

## 2019-02-01 RX ADMIN — Medication 300 MILLIGRAM(S): at 21:41

## 2019-02-01 RX ADMIN — Medication 4 MILLIGRAM(S): at 12:30

## 2019-02-01 RX ADMIN — Medication 4 MILLIGRAM(S): at 23:17

## 2019-02-01 NOTE — H&P PST ADULT - HISTORY OF PRESENT ILLNESS
48F no pmhx presented to [facility] ER at the end of december  after having headache and vision changes x1 month.   Originally, symptoms came and went and were worse upon wakening in the morning and are temporarily relieved with advil. She states shes has also had blurry vision and has been seeing floaters in her peripheral vision (she describes them seeing a bug fly at her from the side, but when she looks she does not see a bug).     Since this time, she has had worsening hallucinations where she thinks there are hawks/snakes in the room, but she is aware that they are not real.   She is not certain if this is related to her medications.  She is unsteady on her feet, she states that some days are better than others with walking.

## 2019-02-01 NOTE — H&P PST ADULT - ASSESSMENT
48F presenting at the end of 2018 with headache and subsequently has had worsening headaches and hallucinations.  MRI with b/l frontal edema.  She presents electively for a right frontal brain biopsy.  1)  Consent signed by patient and attending in chart  2)  Pre-op medical clearance in chart  3)  Home meds: Keppra   4)  Plan for ICU post op  5)  Mechanical DVT prophylaxis  6)  Discussed with Dr. Garcia

## 2019-02-01 NOTE — PROGRESS NOTE ADULT - ASSESSMENT
48y/F with  1.  bilateral frontal masses, brain compression, cerebral edema s/p open brain biopsy (02/01/2019, Dr. Garcia)  2.  fibroids  3.  hepatic steatosis    PLAN:   NEURO: neurochecks q1h, PRN pain meds with fentanyl; judicious use of tylenol in light of hepatic steatosis  s/p biopsy:  f/u final histopathology, MRI in 48 hours, steroids  seizure prophylaxis: levetiracetam 500mg IV BID   REHAB:  physical therapy evaluation and management    EARLY MOB:  bed rest, HOB up    PULM:  PRN O2 support to keep sats >/=92%, incentive spirometry  CARDIO:  SBP goal 100-150mm Hg  ENDO:  Blood sugar goals 140-180 mg/dL, start insulin sliding scale, check lipid profile, A1C  GI:  start PPI for GI prophylaxis while on steroids  DIET: advance as tolerated  RENAL:  d/c IVF once eating well  HEM/ONC: check post-op Hb  VTE Prophylaxis: SCDs only, no DVT chemoprophylaxis for now as patient is high risk for bleed (fresh post-op); baseline LE Doppler for DVT suspected on admission (h/o brain mass)  ID: afebrile, no leukocytosis; periop ancef then d/c  Social: will update family    ATTENDING ATTESTATION:  I was physically present for the key portions of the evaluation and management (E/M) service provided.  I agree with the above history, physical and plan, which I have reviewed and edited where appropriate.    Patient at high risk for neurological deterioration or death due to:  ICU delirium, aspiration PNA, DVT / PE.  Critical care time, excluding procedures: 60 minutes spent on total encounter, more than 50% of the visit was spent counseling and/or coordinating care by the attending physician.     Plan discussed with RN, house staff.

## 2019-02-01 NOTE — PROGRESS NOTE ADULT - SUBJECTIVE AND OBJECTIVE BOX
=================================  NEUROCRITICAL CARE ATTENDING NOTE  =================================    BRITTANY CARTER   MRN-[id number]  Summary:  48y/F who presented with headache and vision changes x 1 month.  Admitted for elective biopsy on .     COURSE IN THE HOSPITAL:   admitted, s/p biopsy    Past Medical History: Brain mass Fibroids No pertinent past medical history   Allergies:  penicillins (Unknown) sulfa drugs (Unknown)  Home meds: colace levetiracetam 500 PO daily oxycodone 5mg PO q6h tramadol 50 mg PO BID     PHYSICAL EXAMINATION  T(C): 35.6 ( @ 11:35), Max: 35.6 ( @ 11:35) HR: 82 ( @ 15:00) (66 - 90) BP: 121/74 ( @ 15:00) (100/70 - 131/71) RR: 14 ( @ 15:00) (14 - 23) SpO2: 100% ( @ 15:00) (100% - 100%)  NEUROLOGIC EXAMINATION:  Patient is awake, alert, fully oriented, pupils 2-3mm equal and briskly reactive to light, EOMs intact, moves all 4s with good strength, L LE active movements limited by hip pain  GENERAL: not intubated, not in cardiorespiratory distress  EENT:  anicteric  CARDIOVASCULAR: (+) S1 S2, normal rate and regular rhythm  PULMONARY: clear to auscultation bilaterally  ABDOMEN: soft, nontender with normoactive bowel sounds  EXTREMITIES: no edema  SKIN: no rash    LABS:  CAPILLARY BLOOD GLUCOSE 98               11.0   5.4   )-----------( 189      ( 2019 12:45 )             32.9     142  |  108  |  12  ----------------------------<  120<H>  4.2   |  24  |  0.74    Ca    9.4      2019 12:45    TPro  7.1  /  Alb  3.6  /  TBili  0.2  /  DBili  x   /  AST  17  /  ALT  25  /  AlkPhos  56   @ 07: @ 15:52  IN: 1650 mL / OUT: 2000 mL / NET: -350 mL    Bacteriology:  CSF studies:  EEG:  Neuroimagin/01 MRI:  navigatinoal; mass in both cerebral hemispheres, c/w gliomatosis   MRI: bilateral frontal / temporal lobe mass c/w diffuse low grade glioma   CT:  hypodensity bilateral frontal and temopral lobes R>L  Other imagin/23 Abd US:  hepatic steatosis   CT chest abd pelvis:  posterior R pelvis multiloculated cystic lesion - pelvic sonography; cystic lesion post R pelvis    MEDICATIONS: fentanyl 12.5 PRN levetiracetam 500 IV q12h dexamethasone 4mg IV q6h     IV FLUIDS: NS@100cc/hr  DRIPS:  DIET: NPO  Lines:  Drains:      Wounds:    CODE STATUS:  Full Code                       GOALS OF CARE:  aggressive                      DISPOSITION:  ICU

## 2019-02-01 NOTE — PROGRESS NOTE ADULT - SUBJECTIVE AND OBJECTIVE BOX
NP Note Neurosurgery Post op Note    HPI:  48F no pmhx presented to [facility] ER at the end of december  after having headache and vision changes x1 month.   Originally, symptoms came and went and were worse upon wakening in the morning and are temporarily relieved with advil. She states shes has also had blurry vision and has been seeing floaters in her peripheral vision (she describes them seeing a bug fly at her from the side, but when she looks she does not see a bug).     Since this time, she has had worsening hallucinations where she thinks there are hawks/snakes in the room, but she is aware that they are not real.   She is not certain if this is related to her medications.  She is unsteady on her feet, she states that some days are better than others with walking. (01 Feb 2019 08:49)      I&O's Summary      PHYSICAL EXAM:  Neurological:  Arousable to name able to name objects OX3 Cranial nerves itnact  HARMON 5/5 no drift or dysmetria  speech is clear      Cardiovascular:RRR  Respiratory: Lungs CTAB  O2 sats 100%  Gastrointestinal:  Abdomen round soft No BS NPO  Genitourinary: voiding via Elliott  Extremities: warm and dry  Incision/Wound: CDI      DIET: NPO    LABS:  CAPILLARY BLOOD GLUCOSE    Drug Levels: [] N/A    CSF Analysis: [] N/A      Allergies    penicillins (Unknown)  sulfa drugs (Unknown)    Intolerances      MEDICATIONS:  Antibiotics:    Neuro:    Anticoagulation:    OTHER:    IVF:    CULTURES:    RADIOLOGY & ADDITIONAL TESTS:      ASSESSMENT:  48y Female s/p  Procedure:  Brain biopsy, open  02/01/2019  Right frontal  Active  ABEDI2.      Operative Findings:  · Operative Findings	Stereotactic navigation used	    PLAN:  NEURO:    Monitor neuro status  Pain Managment  OT/PT in AM  Decadron taper    CARDIOVASCULAR:    Monitor BP status  Medicare if need  Send post op labs    PULMONARY:    Incentive Spirometry  Monitor o2 Sats    RENAL:  Monitor I/O    GI:    PPI  NPO  Antiemtics    HEME:  Monitor H/H  F/U AM Labs    ID:    Post op [id number]    ENDO:    Monitor Na  Monitor glucose  RHISS    DVT PROPHYLAXIS:  [x] Venodynes                                [] Heparin/Lovenox

## 2019-02-01 NOTE — BRIEF OPERATIVE NOTE - PROCEDURE
<<-----Click on this checkbox to enter Procedure Brain biopsy, open  02/01/2019  Right frontal  Active  ABEDI2

## 2019-02-02 LAB
ANION GAP SERPL CALC-SCNC: 10 MMOL/L — SIGNIFICANT CHANGE UP (ref 5–17)
BUN SERPL-MCNC: 9 MG/DL — SIGNIFICANT CHANGE UP (ref 7–23)
CALCIUM SERPL-MCNC: 9.3 MG/DL — SIGNIFICANT CHANGE UP (ref 8.4–10.5)
CHLORIDE SERPL-SCNC: 108 MMOL/L — SIGNIFICANT CHANGE UP (ref 96–108)
CHOLEST SERPL-MCNC: 124 MG/DL — SIGNIFICANT CHANGE UP (ref 10–199)
CO2 SERPL-SCNC: 22 MMOL/L — SIGNIFICANT CHANGE UP (ref 22–31)
CREAT SERPL-MCNC: 0.63 MG/DL — SIGNIFICANT CHANGE UP (ref 0.5–1.3)
GLUCOSE BLDC GLUCOMTR-MCNC: 157 MG/DL — HIGH (ref 70–99)
GLUCOSE BLDC GLUCOMTR-MCNC: 190 MG/DL — HIGH (ref 70–99)
GLUCOSE BLDC GLUCOMTR-MCNC: 245 MG/DL — HIGH (ref 70–99)
GLUCOSE SERPL-MCNC: 162 MG/DL — HIGH (ref 70–99)
HBA1C BLD-MCNC: 5.4 % — SIGNIFICANT CHANGE UP (ref 4–5.6)
HDLC SERPL-MCNC: 70 MG/DL — SIGNIFICANT CHANGE UP
LIPID PNL WITH DIRECT LDL SERPL: 47 MG/DL — SIGNIFICANT CHANGE UP
POTASSIUM SERPL-MCNC: 4 MMOL/L — SIGNIFICANT CHANGE UP (ref 3.5–5.3)
POTASSIUM SERPL-SCNC: 4 MMOL/L — SIGNIFICANT CHANGE UP (ref 3.5–5.3)
SODIUM SERPL-SCNC: 140 MMOL/L — SIGNIFICANT CHANGE UP (ref 135–145)
TOTAL CHOLESTEROL/HDL RATIO MEASUREMENT: 1.8 RATIO — LOW (ref 3.3–7.1)
TRIGL SERPL-MCNC: 36 MG/DL — SIGNIFICANT CHANGE UP (ref 10–149)

## 2019-02-02 PROCEDURE — 70450 CT HEAD/BRAIN W/O DYE: CPT | Mod: 26

## 2019-02-02 PROCEDURE — 70551 MRI BRAIN STEM W/O DYE: CPT | Mod: 26

## 2019-02-02 PROCEDURE — 99233 SBSQ HOSP IP/OBS HIGH 50: CPT | Mod: 24

## 2019-02-02 RX ORDER — POLYETHYLENE GLYCOL 3350 17 G/17G
17 POWDER, FOR SOLUTION ORAL ONCE
Qty: 0 | Refills: 0 | Status: COMPLETED | OUTPATIENT
Start: 2019-02-02 | End: 2019-02-02

## 2019-02-02 RX ORDER — LEVETIRACETAM 250 MG/1
500 TABLET, FILM COATED ORAL
Qty: 0 | Refills: 0 | Status: DISCONTINUED | OUTPATIENT
Start: 2019-02-02 | End: 2019-02-05

## 2019-02-02 RX ORDER — BENZOCAINE AND MENTHOL 5; 1 G/100ML; G/100ML
1 LIQUID ORAL THREE TIMES A DAY
Qty: 0 | Refills: 0 | Status: DISCONTINUED | OUTPATIENT
Start: 2019-02-02 | End: 2019-02-05

## 2019-02-02 RX ADMIN — BENZOCAINE AND MENTHOL 1 LOZENGE: 5; 1 LIQUID ORAL at 09:30

## 2019-02-02 RX ADMIN — Medication 2: at 12:31

## 2019-02-02 RX ADMIN — OXYCODONE HYDROCHLORIDE 5 MILLIGRAM(S): 5 TABLET ORAL at 22:06

## 2019-02-02 RX ADMIN — Medication 100 MILLIGRAM(S): at 12:31

## 2019-02-02 RX ADMIN — POLYETHYLENE GLYCOL 3350 17 GRAM(S): 17 POWDER, FOR SOLUTION ORAL at 21:38

## 2019-02-02 RX ADMIN — Medication 4 MILLIGRAM(S): at 12:31

## 2019-02-02 RX ADMIN — Medication 4 MILLIGRAM(S): at 19:42

## 2019-02-02 RX ADMIN — Medication 4: at 22:54

## 2019-02-02 RX ADMIN — LEVETIRACETAM 500 MILLIGRAM(S): 250 TABLET, FILM COATED ORAL at 19:43

## 2019-02-02 RX ADMIN — Medication 4 MILLIGRAM(S): at 06:01

## 2019-02-02 RX ADMIN — Medication 2: at 06:01

## 2019-02-02 RX ADMIN — OXYCODONE HYDROCHLORIDE 5 MILLIGRAM(S): 5 TABLET ORAL at 02:23

## 2019-02-02 RX ADMIN — OXYCODONE HYDROCHLORIDE 5 MILLIGRAM(S): 5 TABLET ORAL at 21:39

## 2019-02-02 RX ADMIN — BENZOCAINE AND MENTHOL 1 LOZENGE: 5; 1 LIQUID ORAL at 00:47

## 2019-02-02 RX ADMIN — PANTOPRAZOLE SODIUM 40 MILLIGRAM(S): 20 TABLET, DELAYED RELEASE ORAL at 06:57

## 2019-02-02 RX ADMIN — LEVETIRACETAM 420 MILLIGRAM(S): 250 TABLET, FILM COATED ORAL at 06:01

## 2019-02-02 NOTE — PHYSICAL THERAPY INITIAL EVALUATION ADULT - CRITERIA FOR SKILLED THERAPEUTIC INTERVENTIONS
impairments found/therapy frequency/anticipated discharge recommendation/rehab potential/functional limitations in following categories/risk reduction/prevention

## 2019-02-02 NOTE — OCCUPATIONAL THERAPY INITIAL EVALUATION ADULT - PLANNED THERAPY INTERVENTIONS, OT EVAL
ADL retraining/bed mobility training/transfer training/balance training/cognitive, visual perceptual

## 2019-02-02 NOTE — PHYSICAL THERAPY INITIAL EVALUATION ADULT - GENERAL OBSERVATIONS, REHAB EVAL
Received semi-supine in bed with +tele, +pulse ox, +R frontal gauze bandage with mild serosanguineous drainage, on room air, +IV, in no apparent distress. Patient appears to be resting comfortably in bed

## 2019-02-02 NOTE — CONSULT NOTE ADULT - SUBJECTIVE AND OBJECTIVE BOX
Patient is a 48y old  Female who presents with a chief complaint of Brain mass (01 Feb 2019 12:04)        HPI:  48F no pmhx presented to [facility] ER at the end of december  after having headache and vision changes x1 month.   Originally, symptoms came and went and were worse upon wakening in the morning and are temporarily relieved with advil. She states shes has also had blurry vision and has been seeing floaters in her peripheral vision (she describes them seeing a bug fly at her from the side, but when she looks she does not see a bug).     Since this time, she has had worsening hallucinations where she thinks there are hawks/snakes in the room, but she is aware that they are not real.   She is not certain if this is related to her medications.  She is unsteady on her feet, she states that some days are better than others with walking. (01 Feb 2019 08:49)    s/p surgery- no new weakness- mild headache UE/ LE  Allergies  penicillins (Unknown)  sulfa drugs (Unknown)      Health Issues  BRAINMASS G93.9  No pertinent family history in first degree relatives  Handoff  Brain mass  Fibroids  No pertinent past medical history  Brain neoplasm  Brain neoplasm  Brain biopsy, open  H/O hysterectomy with unilateral oophorectomy  No significant past surgical history  No significant past surgical history        FAMILY HISTORY:  No pertinent family history in first degree relatives      MEDICATIONS  (STANDING):  dexamethasone  Injectable 4 milliGRAM(s) IV Push every 6 hours  dextrose 5%. 1000 milliLiter(s) (50 mL/Hr) IV Continuous <Continuous>  dextrose 50% Injectable 12.5 Gram(s) IV Push once  dextrose 50% Injectable 25 Gram(s) IV Push once  dextrose 50% Injectable 25 Gram(s) IV Push once  docusate sodium 100 milliGRAM(s) Oral daily  influenza   Vaccine 0.5 milliLiter(s) IntraMuscular once  insulin lispro (HumaLOG) corrective regimen sliding scale   SubCutaneous Before meals and at bedtime  levETIRAcetam 500 milliGRAM(s) Oral two times a day  pantoprazole    Tablet 40 milliGRAM(s) Oral before breakfast    MEDICATIONS  (PRN):  benzocaine 15 mG/menthol 3.6 mG Lozenge 1 Lozenge Oral three times a day PRN Sore Throat  dextrose 40% Gel 15 Gram(s) Oral once PRN Blood Glucose LESS THAN 70 milliGRAM(s)/deciliter  glucagon  Injectable 1 milliGRAM(s) IntraMuscular once PRN Glucose LESS THAN 70 milligrams/deciliter  hydrALAZINE Injectable 10 milliGRAM(s) IV Push every 1 hour PRN sbp > 160mmHg HR < 65  ondansetron Injectable 4 milliGRAM(s) IV Push every 6 hours PRN Nausea and/or Vomiting  oxyCODONE    IR 5 milliGRAM(s) Oral every 4 hours PRN Severe Pain (7 - 10)      PAST MEDICAL & SURGICAL HISTORY:  Brain mass  Fibroids  H/O hysterectomy with unilateral oophorectomy      Labs                          11.0   5.4   )-----------( 189      ( 01 Feb 2019 12:45 )             32.9     02-02    140  |  108  |  9   ----------------------------<  162<H>  4.0   |  22  |  0.63    Ca    9.3      02 Feb 2019 03:47    TPro  7.1  /  Alb  3.6  /  TBili  0.2  /  DBili  x   /  AST  17  /  ALT  25  /  AlkPhos  56  02-01      Radiology:    Physical Exam    MENTAL STATUS  -Level of Consciousness- awake    Orientation- person, place time  Language- aphasia/ dysarthria- nl  Memory- recent and remote- nl      Cranial Nerve 1- 12  Pupils- equal and reactive  Eye movements- full  Facial - no asymmetry   Lower CN-nl    Gait and Station- n/a    MOTOR  Upper-nl  Lower-nl    Reflexes- intact    Sensation- no sensory level    Cerebellar- no tremors    vascular - no bruits    Assessment- Brain mass     Plan  as per NS- keppra

## 2019-02-02 NOTE — PROGRESS NOTE ADULT - ASSESSMENT
48y/F with  1.  bilateral frontal masses, brain compression, cerebral edema s/p open brain biopsy (02/01/2019, Dr. Garcia)  2.  fibroids  3.  hepatic steatosis    PLAN:   NEURO: neurochecks q1h, PRN pain meds with fentanyl; judicious use of tylenol in light of hepatic steatosis  s/p biopsy:  f/u final histopathology, MRI in 48 hours, steroids  seizure prophylaxis: levetiracetam 500mg IV BID   REHAB:  physical therapy evaluation and management    EARLY MOB:  bed rest, HOB up    PULM:  PRN O2 support to keep sats >/=92%, incentive spirometry  CARDIO:  SBP goal 100-150mm Hg  ENDO:  Blood sugar goals 140-180 mg/dL, start insulin sliding scale, check lipid profile, A1C  GI:  start PPI for GI prophylaxis while on steroids  DIET: advance as tolerated  RENAL:  d/c IVF once eating well  HEM/ONC: check post-op Hb  VTE Prophylaxis: SCDs only, no DVT chemoprophylaxis for now as patient is high risk for bleed (fresh post-op); baseline LE Doppler for DVT suspected on admission (h/o brain mass)  ID: afebrile, no leukocytosis; periop ancef then d/c  Social: will update family    ATTENDING ATTESTATION:  I was physically present for the key portions of the evaluation and management (E/M) service provided.  I agree with the above history, physical and plan, which I have reviewed and edited where appropriate.    Patient at high risk for neurological deterioration or death due to:  ICU delirium, aspiration PNA, DVT / PE.  Critical care time, excluding procedures: 60 minutes spent on total encounter, more than 50% of the visit was spent counseling and/or coordinating care by the attending physician.     Plan discussed with RN, house staff. 48y/F with  1.  bilateral frontal masses, brain compression, cerebral edema s/p open brain biopsy (02/01/2019, Dr. Garcia)  2.  fibroids  3.  hepatic steatosis    PLAN:   NEURO: neurochecks q4h, PRN pain meds with tylenol, opiates, LFTs normalized  s/p biopsy:  f/u final histopathology, MRI asap, steroids taper over 2 weeks switch to PO  seizure prophylaxis: levetiracetam 500mg IV BID - switch to PO  REHAB:  physical therapy evaluation and management    EARLY MOB:  OOB to chair, ambulate    PULM:  room air, incentive spirometry  CARDIO:  SBP goal 100-150mm Hg  ENDO:  Blood sugar goals 140-180 mg/dL, start insulin sliding scale, check lipid profile, A1C  GI:  cont PPI for GI prophylaxis while on steroids  DIET: start regular diet  RENAL:  d/c IVF once eating well  HEM/ONC: Hb stable  VTE Prophylaxis: SCDs only, SQL tonight if staying, f/u baseline LE Doppler for DVT suspected on admission (h/o brain mass)  ID: afebrile, no leukocytosis; periop ancef then d/c  Social: will update family    ATTENDING ATTESTATION:  I was physically present for the key portions of the evaluation and management (E/M) service provided.  I agree with the above history, physical and plan, which I have reviewed and edited where appropriate.    Patient at high risk for neurological deterioration or death due to:  ICU delirium, aspiration PNA, DVT / PE.  Critical care time, excluding procedures: 60 minutes spent on total encounter, more than 50% of the visit was spent counseling and/or coordinating care by the attending physician.     Plan discussed with RN, house staff. 48y/F with  1.  bilateral frontal masses, brain compression, cerebral edema s/p open brain biopsy (02/01/2019, Dr. Garcia)  2.  fibroids  3.  hepatic steatosis    PLAN:   NEURO: neurochecks q4h, PRN pain meds with tylenol, opiates, LFTs normalized  s/p biopsy:  f/u final histopathology, MRI asap, steroids taper over 2 weeks switch to PO  seizure prophylaxis: levetiracetam 500mg IV BID - switch to PO  REHAB:  physical therapy evaluation and management    EARLY MOB:  OOB to chair, ambulate    PULM:  room air, incentive spirometry  CARDIO:  SBP goal 100-150mm Hg  ENDO:  Blood sugar goals 140-180 mg/dL, start insulin sliding scale, check lipid profile, A1C  GI:  cont PPI for GI prophylaxis while on steroids  DIET: start regular diet  RENAL:  d/c IVF once eating well  HEM/ONC: Hb stable  VTE Prophylaxis: SCDs only, SQL tonight if staying, f/u baseline LE Doppler for DVT suspected on admission (h/o brain mass)  ID: afebrile, no leukocytosis; periop ancef then d/c  Social: will update family    ATTENDING ATTESTATION:  I was physically present for the key portions of the evaluation and management (E/M) service provided.  I agree with the above history, physical and plan which I have reviewed and edited where appropriate.     Patient not at high risk for neurologic deterioration / death.  Time spent on this noncritically ill patient: 45 minutes spent on total encounter, more than 50% of the visit was spent counseling and/or coordinating care by the attending physician.    Plan discussed with RN, house staff.

## 2019-02-02 NOTE — OCCUPATIONAL THERAPY INITIAL EVALUATION ADULT - GENERAL OBSERVATIONS, REHAB EVAL
Right hand dominant. Patient cleared for Occupational Therapy by [facility] Dc, patient received supine in non-acute distress, +tele, +IV heplock, + right cranial dressing C/D/I. Patient reports incisional pain however not quantified.

## 2019-02-02 NOTE — PHYSICAL THERAPY INITIAL EVALUATION ADULT - ADDITIONAL COMMENTS
Patient lives alone in an elevator apartment with 5 steps with handrails to enter. Prior to admission, patient was independent for all functional mobility, ADLs, and work duties without assistive device. Denies history of falls. Denies home health assistance.

## 2019-02-02 NOTE — OCCUPATIONAL THERAPY INITIAL EVALUATION ADULT - PERTINENT HX OF CURRENT PROBLEM, REHAB EVAL
Since this time, she has had worsening hallucinations where she thinks there are hawks/snakes in the room, but she is aware that they are not real.   She is not certain if this is related to her medications.  s/p right craniotomy for brain tumor biopsy, partial lobectomy 2/1.

## 2019-02-02 NOTE — OCCUPATIONAL THERAPY INITIAL EVALUATION ADULT - MD ORDER
48F no pmhx presented to [facility] ER at the end of december  after having headache and vision changes x1 month. Originally, symptoms came and went and were worse upon wakening in the morning and are temporarily relieved with advil. She states shes has also had blurry vision and has been seeing floaters in her peripheral vision.

## 2019-02-02 NOTE — PROGRESS NOTE ADULT - SUBJECTIVE AND OBJECTIVE BOX
Hospital Course:   POD# 1: WILMA overnight. Neuro stable. Given cepacol for sore throat.      Vital Signs Last 24 Hrs  T(C): 36.3 (01 Feb 2019 16:00), Max: 36.3 (01 Feb 2019 16:00)  T(F): 97.3 (01 Feb 2019 16:00), Max: 97.3 (01 Feb 2019 16:00)  HR: 58 (02 Feb 2019 02:00) (58 - 90)  BP: 102/70 (02 Feb 2019 01:22) (100/70 - 131/71)  BP(mean): 81 (02 Feb 2019 01:22) (75 - 98)  RR: 17 (02 Feb 2019 02:00) (14 - 27)  SpO2: 97% (02 Feb 2019 02:00) (97% - 100%)    I&O's Detail    01 Feb 2019 07:01  -  02 Feb 2019 02:55  --------------------------------------------------------  IN:    Other: 1200 mL    sodium chloride 0.9%.: 850 mL  Total IN: 2050 mL    OUT:    Estimated Blood Loss: 50 mL    Indwelling Catheter - Urethral: 2545 mL    Voided: 850 mL  Total OUT: 3445 mL    Total NET: -1395 mL        I&O's Summary    01 Feb 2019 07:01  -  02 Feb 2019 02:55  --------------------------------------------------------  IN: 2050 mL / OUT: 3445 mL / NET: -1395 mL        PHYSICAL EXAM:  Neurological: AAOx3, FC, speech coherent  CNII-XII: EOM intact, PERRL, face symmetric  Motor: MAEx4 5/5 UE and LE b/l  SILT throughout  Incision/Wound: Scalp incision site C/D/I    TUBES/LINES:  [] CVC  [] A-line  [] Lumbar Drain  [] Ventriculostomy  [x] Other: cleary    DIET:  [] NPO  [x] Mechanical  [] Tube feeds    LABS:                        11.0   5.4   )-----------( 189      ( 01 Feb 2019 12:45 )             32.9     02-01    142  |  108  |  12  ----------------------------<  120<H>  4.2   |  24  |  0.74    Ca    9.4      01 Feb 2019 12:45    TPro  7.1  /  Alb  3.6  /  TBili  0.2  /  DBili  x   /  AST  17  /  ALT  25  /  AlkPhos  56  02-01    PT/INR - ( 01 Feb 2019 12:45 )   PT: 13.0 sec;   INR: 1.15          PTT - ( 01 Feb 2019 12:45 )  PTT:24.6 sec        CAPILLARY BLOOD GLUCOSE      POCT Blood Glucose.: 165 mg/dL (01 Feb 2019 21:08)  POCT Blood Glucose.: 105 mg/dL (01 Feb 2019 16:36)  POCT Blood Glucose.: 98 mg/dL (01 Feb 2019 12:32)      Drug Levels: [] N/A    CSF Analysis: [] N/A      Allergies    penicillins (Unknown)  sulfa drugs (Unknown)    Intolerances      MEDICATIONS:  Antibiotics:    Neuro:  fentaNYL    Injectable 12.5 MICROGram(s) IV Push every 2 hours PRN  levETIRAcetam  IVPB 500 milliGRAM(s) IV Intermittent every 12 hours  ondansetron Injectable 4 milliGRAM(s) IV Push every 6 hours PRN  oxyCODONE    IR 5 milliGRAM(s) Oral every 4 hours PRN    Anticoagulation:    OTHER:  benzocaine 15 mG/menthol 3.6 mG Lozenge 1 Lozenge Oral three times a day PRN  dexamethasone  Injectable 4 milliGRAM(s) IV Push every 6 hours  dextrose 40% Gel 15 Gram(s) Oral once PRN  dextrose 50% Injectable 12.5 Gram(s) IV Push once  dextrose 50% Injectable 25 Gram(s) IV Push once  dextrose 50% Injectable 25 Gram(s) IV Push once  docusate sodium 100 milliGRAM(s) Oral daily  glucagon  Injectable 1 milliGRAM(s) IntraMuscular once PRN  hydrALAZINE Injectable 10 milliGRAM(s) IV Push every 1 hour PRN  influenza   Vaccine 0.5 milliLiter(s) IntraMuscular once  insulin lispro (HumaLOG) corrective regimen sliding scale   SubCutaneous Before meals and at bedtime  pantoprazole    Tablet 40 milliGRAM(s) Oral before breakfast    IVF:  dextrose 5%. 1000 milliLiter(s) IV Continuous <Continuous>  sodium chloride 0.9%. 1000 milliLiter(s) IV Continuous <Continuous>    CULTURES:    RADIOLOGY & ADDITIONAL TESTS:      ASSESSMENT:  48y Female s/p right craniotomy for brain tumor biopsy, partial lobectomy POD#1    BRAINMASS G93.9  No pertinent family history in first degree relatives  Brain mass  Fibroids  No pertinent past medical history  Brain neoplasm  Brain neoplasm  Brain biopsy, open  H/O hysterectomy with unilateral oophorectomy  No significant past surgical history  No significant past surgical history      PLAN:  NEURO:  -neuro checks  -pain control prn oxycodone, no tylenol (elevated LFTs)  -continue decadron taper  -continue keppra for seizure prophylaxis  -CTH today    CARDIOVASCULAR:  --150    PULMONARY:  -room air    RENAL:  -HLIV    GI:  -docusate/senna  -GI ppx with protonix while on steroids    HEME:  -H/H stable    ID:  -postop vanco    ENDO:  -ISS    DVT PROPHYLAXIS:  [x] Venodynes                                [] Heparin/Lovenox    DISPOSITION: pending    -D/w Dr. Garcia and Dr. Collazo

## 2019-02-02 NOTE — PHYSICAL THERAPY INITIAL EVALUATION ADULT - PERTINENT HX OF CURRENT PROBLEM, REHAB EVAL
Patient is a 48F no pmhx presented to [facility] ER at the end of december  after having headache and vision changes x1 month. MRI with b/l frontal edema.  She presents electively for a right frontal brain biopsy.

## 2019-02-02 NOTE — PROGRESS NOTE ADULT - SUBJECTIVE AND OBJECTIVE BOX
=================================  NEUROCRITICAL CARE ATTENDING NOTE  =================================    BRITTANY CARTER   MRN-[id number]  Summary:  48y/F who presented with headache and vision changes x 1 month.  Admitted for elective biopsy on .     COURSE IN THE HOSPITAL:   admitted, s/p biopsy    Past Medical History: Brain mass Fibroids No pertinent past medical history   Allergies:  penicillins (Unknown) sulfa drugs (Unknown)  Home meds: colace levetiracetam 500 PO daily oxycodone 5mg PO q6h tramadol 50 mg PO BID     PHYSICAL EXAMINATION  T(C): 36.3 ( @ 16:00), Max: 36.3 ( @ 16:00) HR: 60 ( @ 07:00) (58 - 90) BP: 102/70 ( @ 01:22) (100/70 - 131/71) RR: 19 ( @ 07:00) (14 - 27) SpO2: 99% ( @ 07:00) (97% - 100%)  NEUROLOGIC EXAMINATION:  Patient is awake, alert, fully oriented, pupils 2-3mm equal and briskly reactive to light, EOMs intact, moves all 4s with good strength, L LE active movements limited by hip pain  GENERAL: not intubated, not in cardiorespiratory distress  EENT:  anicteric  CARDIOVASCULAR: (+) S1 S2, normal rate and regular rhythm  PULMONARY: clear to auscultation bilaterally  ABDOMEN: soft, nontender with normoactive bowel sounds  EXTREMITIES: no edema  SKIN: no rash    LABS:  CAPILLARY BLOOD GLUCOSE 165 105 98               11.0   5.4   )-----------( 189      ( 2019 12:45 )             32.9     140  |  108  |  9   ----------------------------<  162<H>  4.0   |  22  |  0.63    Ca    9.3      2019 03:47    TPro  7.1  /  Alb  3.6  /  TBili  0.2  /  DBili  x   /  AST  17  /  ALT  25  /  AlkPhos  56   @ 07: @ 07:00  IN: 2050 mL / OUT: 4045 mL / NET: - mL    Bacteriology:  CSF studies:  EEG:  Neuroimagin/01 MRI:  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, c/w gliomatosis   MRI: bilateral frontal / temporal lobe mass c/w diffuse low grade glioma   CT:  hypodensity bilateral frontal and temopral lobes R>L  Other imagin/23 Abd US:  hepatic steatosis   CT chest abd pelvis:  posterior R pelvis multiloculated cystic lesion - pelvic sonography; cystic lesion post R pelvis    MEDICATIONS: fentanyl 12.5 PRN levetiracetam 500 IV q12h dexamethasone 4mg IV q6h   MEDICATIONS: levetiracetam 500 IV q12h docusate 100 PO daily pantoprazole dexamethasone 4mg IV q6h mod ISS lozenges fentanyl PRN oxycodone PRN     IV FLUIDS: NS@100cc/hr  DRIPS:  DIET: NPO  Lines:  Drains:      Wounds:    CODE STATUS:  Full Code                       GOALS OF CARE:  aggressive                      DISPOSITION:  ICU =================================  NEUROCRITICAL CARE ATTENDING NOTE  =================================    BRITTANY CARTER   MRN-[id number]  Summary:  48y/F who presented with headache and vision changes x 1 month.  Admitted for elective biopsy on .     COURSE IN THE HOSPITAL:   admitted, s/p biopsy    Past Medical History: Brain mass Fibroids No pertinent past medical history   Allergies:  penicillins (Unknown) sulfa drugs (Unknown)  Home meds: colace levetiracetam 500 PO daily oxycodone 5mg PO q6h tramadol 50 mg PO BID     PHYSICAL EXAMINATION  T(C): 36.3 ( @ 16:00), Max: 36.3 ( @ 16:00) HR: 60 ( @ 07:00) (58 - 90) BP: 102/70 ( @ 01:22) (100/70 - 131/71) RR: 19 ( @ 07:00) (14 - 27) SpO2: 99% ( @ 07:00) (97% - 100%)  NEUROLOGIC EXAMINATION:  Patient is awake, alert, fully oriented, pupils 2-3mm equal and briskly reactive to light, EOMs intact, moves all 4s with good strength  GENERAL: not intubated, not in cardiorespiratory distress  EENT:  anicteric  CARDIOVASCULAR: (+) S1 S2, normal rate and regular rhythm  PULMONARY: clear to auscultation bilaterally  ABDOMEN: soft, nontender with normoactive bowel sounds  EXTREMITIES: no edema  SKIN: no rash    LABS:  CAPILLARY BLOOD GLUCOSE 165 105 98    CBC pending    140  |  108  |  9   ----------------------------<  162<H>  4.0   |  22  |  0.63    Ca    9.3      2019 03:47    TPro  7.1  /  Alb  3.6  /  TBili  0.2  /  DBili  x   /  AST  17  /  ALT  25  /  AlkPhos  56   @ 07:01  -   @ 07:00  IN: 2050 mL / OUT: 4045 mL / NET: - mL    Bacteriology:  CSF studies:  EEG:  Neuroimagin/01 MRI:  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, c/w gliomatosis   MRI: bilateral frontal / temporal lobe mass c/w diffuse low grade glioma   CT:  hypodensity bilateral frontal and temopral lobes R>L  Other imagin/23 Abd US:  hepatic steatosis   CT chest abd pelvis:  posterior R pelvis multiloculated cystic lesion - pelvic sonography; cystic lesion post R pelvis    MEDICATIONS: levetiracetam 500 IV q12h docusate 100 PO daily pantoprazole dexamethasone 4mg IV q6h mod ISS lozenges fentanyl PRN oxycodone PRN     IV FLUIDS: NS@100cc/hr  DRIPS:  DIET: NPO  Lines:  Drains:    Wounds:    CODE STATUS:  Full Code                       GOALS OF CARE:  aggressive                      DISPOSITION:  8La =================================  NEUROCRITICAL CARE ATTENDING NOTE  =================================    BRITTANY CARTER   MRN-[id number]  Summary:  48y/F who presented with headache and vision changes x 1 month.  Admitted for elective biopsy on .   REVIEW OF SYSTEMS:  No headaches, no nausea or vomiting; 14 -point review of systems otherwise unremarkable.    COURSE IN THE HOSPITAL:   admitted, s/p biopsy    Past Medical History: Brain mass Fibroids No pertinent past medical history   Allergies:  penicillins (Unknown) sulfa drugs (Unknown)  Home meds: colace levetiracetam 500 PO daily oxycodone 5mg PO q6h tramadol 50 mg PO BID     PHYSICAL EXAMINATION  T(C): 36.3 ( @ 16:00), Max: 36.3 ( @ 16:00) HR: 60 ( @ 07:00) (58 - 90) BP: 102/70 ( @ 01:22) (100/70 - 131/71) RR: 19 ( @ 07:00) (14 - 27) SpO2: 99% ( @ 07:00) (97% - 100%)  NEUROLOGIC EXAMINATION:  Patient is awake, alert, fully oriented, pupils 2-3mm equal and briskly reactive to light, EOMs intact, moves all 4s with good strength  GENERAL: not intubated, not in cardiorespiratory distress  EENT:  anicteric  CARDIOVASCULAR: (+) S1 S2, normal rate and regular rhythm  PULMONARY: clear to auscultation bilaterally  ABDOMEN: soft, nontender with normoactive bowel sounds  EXTREMITIES: no edema  SKIN: no rash    LABS:  CAPILLARY BLOOD GLUCOSE 165 105 98    CBC pending    140  |  108  |  9   ----------------------------<  162<H>  4.0   |  22  |  0.63    Ca    9.3      2019 03:47    TPro  7.1  /  Alb  3.6  /  TBili  0.2  /  DBili  x   /  AST  17  /  ALT  25  /  AlkPhos  56   @ 07:01  -   @ 07:00  IN: 205 mL / OUT: 4045 mL / NET: -1995 mL    Bacteriology:  CSF studies:  EEG:  Neuroimagin/01 MRI:  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, c/w gliomatosis   MRI: bilateral frontal / temporal lobe mass c/w diffuse low grade glioma   CT:  hypodensity bilateral frontal and temopral lobes R>L  Other imagin/23 Abd US:  hepatic steatosis   CT chest abd pelvis:  posterior R pelvis multiloculated cystic lesion - pelvic sonography; cystic lesion post R pelvis    MEDICATIONS: levetiracetam 500 IV q12h docusate 100 PO daily pantoprazole dexamethasone 4mg IV q6h mod ISS lozenges fentanyl PRN oxycodone PRN     IV FLUIDS: NS@100cc/hr  DRIPS:  DIET: NPO  Lines:  Drains:    Wounds:    CODE STATUS:  Full Code                       GOALS OF CARE:  aggressive                      DISPOSITION:  8La

## 2019-02-03 ENCOUNTER — TRANSCRIPTION ENCOUNTER (OUTPATIENT)
Age: 49
End: 2019-02-03

## 2019-02-03 LAB
GLUCOSE BLDC GLUCOMTR-MCNC: 122 MG/DL — HIGH (ref 70–99)
GLUCOSE BLDC GLUCOMTR-MCNC: 136 MG/DL — HIGH (ref 70–99)
GLUCOSE BLDC GLUCOMTR-MCNC: 170 MG/DL — HIGH (ref 70–99)
GLUCOSE BLDC GLUCOMTR-MCNC: 250 MG/DL — HIGH (ref 70–99)

## 2019-02-03 PROCEDURE — 99231 SBSQ HOSP IP/OBS SF/LOW 25: CPT | Mod: 24

## 2019-02-03 RX ORDER — DEXAMETHASONE 0.5 MG/5ML
2 ELIXIR ORAL
Qty: 57 | Refills: 0 | OUTPATIENT
Start: 2019-02-03 | End: 2019-02-16

## 2019-02-03 RX ORDER — SODIUM CHLORIDE 0.65 %
1 AEROSOL, SPRAY (ML) NASAL EVERY 8 HOURS
Qty: 0 | Refills: 0 | Status: DISCONTINUED | OUTPATIENT
Start: 2019-02-03 | End: 2019-02-05

## 2019-02-03 RX ORDER — DEXAMETHASONE 0.5 MG/5ML
4 ELIXIR ORAL EVERY 6 HOURS
Qty: 0 | Refills: 0 | Status: DISCONTINUED | OUTPATIENT
Start: 2019-02-03 | End: 2019-02-05

## 2019-02-03 RX ORDER — PANTOPRAZOLE SODIUM 20 MG/1
1 TABLET, DELAYED RELEASE ORAL
Qty: 14 | Refills: 0 | OUTPATIENT
Start: 2019-02-03 | End: 2019-02-16

## 2019-02-03 RX ORDER — LEVETIRACETAM 250 MG/1
1 TABLET, FILM COATED ORAL
Qty: 60 | Refills: 1 | OUTPATIENT
Start: 2019-02-03 | End: 2019-04-03

## 2019-02-03 RX ADMIN — Medication 4: at 21:40

## 2019-02-03 RX ADMIN — Medication 4 MILLIGRAM(S): at 08:53

## 2019-02-03 RX ADMIN — Medication 100 MILLIGRAM(S): at 12:10

## 2019-02-03 RX ADMIN — Medication 4 MILLIGRAM(S): at 02:11

## 2019-02-03 RX ADMIN — Medication 4 MILLIGRAM(S): at 15:01

## 2019-02-03 RX ADMIN — LEVETIRACETAM 500 MILLIGRAM(S): 250 TABLET, FILM COATED ORAL at 06:30

## 2019-02-03 RX ADMIN — OXYCODONE HYDROCHLORIDE 5 MILLIGRAM(S): 5 TABLET ORAL at 22:17

## 2019-02-03 RX ADMIN — OXYCODONE HYDROCHLORIDE 5 MILLIGRAM(S): 5 TABLET ORAL at 22:24

## 2019-02-03 RX ADMIN — Medication 2: at 12:10

## 2019-02-03 RX ADMIN — LEVETIRACETAM 500 MILLIGRAM(S): 250 TABLET, FILM COATED ORAL at 17:11

## 2019-02-03 RX ADMIN — Medication 1 SPRAY(S): at 20:38

## 2019-02-03 RX ADMIN — PANTOPRAZOLE SODIUM 40 MILLIGRAM(S): 20 TABLET, DELAYED RELEASE ORAL at 06:30

## 2019-02-03 RX ADMIN — Medication 4 MILLIGRAM(S): at 21:39

## 2019-02-03 NOTE — PROGRESS NOTE ADULT - SUBJECTIVE AND OBJECTIVE BOX
=================================  NEUROCRITICAL CARE ATTENDING NOTE  =================================    BRITTANY CARTER   MRN-[id number]  Summary:  48y/F who presented with headache and vision changes x 1 month.  Admitted for elective biopsy on .   REVIEW OF SYSTEMS:  No headaches, no nausea or vomiting; 14 -point review of systems otherwise unremarkable.    COURSE IN THE HOSPITAL:   admitted, s/p biopsy    Past Medical History: Brain mass Fibroids No pertinent past medical history   Allergies:  penicillins (Unknown) sulfa drugs (Unknown)  Home meds: colace levetiracetam 500 PO daily oxycodone 5mg PO q6h tramadol 50 mg PO BID     PHYSICAL EXAMINATION  T(C): 37.1 ( @ 09:28), Max: 37.3 ( @ 14:00) HR: 70 ( @ 11:20) (58 - 90) BP: 106/61 ( @ 11:20) (96/63 - 120/55) RR: 18 ( @ 08:11) (15 - 18) SpO2: 100% ( @ 11:20) (97% - 100%)  NEUROLOGIC EXAMINATION:  Patient is awake, alert, fully oriented, pupils 2-3mm equal and briskly reactive to light, EOMs intact, moves all 4s with good strength  GENERAL: not intubated, not in cardiorespiratory distress  EENT:  anicteric  CARDIOVASCULAR: (+) S1 S2, normal rate and regular rhythm  PULMONARY: clear to auscultation bilaterally  ABDOMEN: soft, nontender with normoactive bowel sounds  EXTREMITIES: no edema  SKIN: no rash    LABS:  CAPILLARY BLOOD GLUCOSE 170 136 245 157                          11.0   5.4   )-----------( 189      ( 2019 12:45 )             32.9     140  |  108  |  9   ----------------------------<  162<H>  4.0   |  22  |  0.63    Ca    9.3      2019 03:47    TPro  7.1  /  Alb  3.6  /  TBili  0.2  /  DBili  x   /  AST  17  /  ALT  25  /  AlkPhos  56   @ 07: @ 07:00  IN: 600 mL / OUT: 350 mL / NET: 250 mL    Bacteriology:  CSF studies:  EEG:  Neuroimagin/01 MRI:  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, c/w gliomatosis   MRI: bilateral frontal / temporal lobe mass c/w diffuse low grade glioma   CT:  hypodensity bilateral frontal and temopral lobes R>L  Other imagin/23 Abd US:  hepatic steatosis   CT chest abd pelvis:  posterior R pelvis multiloculated cystic lesion - pelvic sonography; cystic lesion post R pelvis    MEDICATIONS: levetiracetam 500 PO BID docusate 100 PO daily pantoprazole 40 daily dexamethasone 4mg PO q6h mod ISS lozenges oxycodone PRN     IV FLUIDS: IVL  DRIPS:  DIET: regular  Lines:  Drains:    Wounds:    CODE STATUS:  Full Code                       GOALS OF CARE:  aggressive                      DISPOSITION:  8La

## 2019-02-03 NOTE — PROGRESS NOTE ADULT - SUBJECTIVE AND OBJECTIVE BOX
Neurology Follow up note    Name  BRITTANY CARTER    HPI:  48F no pmhx presented to [facility] ER at the end of december  after having headache and vision changes x1 month.   Originally, symptoms came and went and were worse upon wakening in the morning and are temporarily relieved with advil. She states shes has also had blurry vision and has been seeing floaters in her peripheral vision (she describes them seeing a bug fly at her from the side, but when she looks she does not see a bug).     Since this time, she has had worsening hallucinations where she thinks there are hawks/snakes in the room, but she is aware that they are not real.   She is not certain if this is related to her medications.  She is unsteady on her feet, she states that some days are better than others with walking. (01 Feb 2019 08:49)      Interval History - no new focal weakness - no tremors        REVIEW OF SYSTEMS    Vital Signs Last 24 Hrs  T(C): 37.1 (03 Feb 2019 09:28), Max: 37.1 (03 Feb 2019 09:28)  T(F): 98.8 (03 Feb 2019 09:28), Max: 98.8 (03 Feb 2019 09:28)  HR: 98 (03 Feb 2019 11:58) (58 - 98)  BP: 103/64 (03 Feb 2019 11:58) (96/63 - 120/55)  BP(mean): 79 (03 Feb 2019 11:58) (75 - 87)  RR: 18 (03 Feb 2019 11:58) (15 - 18)  SpO2: 100% (03 Feb 2019 11:58) (97% - 100%)    Physical Exam-     Mental Status- awake and responsive to commands    Cranial Nerves- full EOM    Gait and station- n/a    Motor- moves all 4 extremities    Reflexes- decreased    Sensation- no sensory level    Coordination- no tremors    Vascular - no bruits    Medications  benzocaine 15 mG/menthol 3.6 mG Lozenge 1 Lozenge Oral three times a day PRN  dexamethasone     Tablet 4 milliGRAM(s) Oral every 6 hours  dextrose 40% Gel 15 Gram(s) Oral once PRN  dextrose 50% Injectable 12.5 Gram(s) IV Push once  dextrose 50% Injectable 25 Gram(s) IV Push once  dextrose 50% Injectable 25 Gram(s) IV Push once  docusate sodium 100 milliGRAM(s) Oral daily  glucagon  Injectable 1 milliGRAM(s) IntraMuscular once PRN  hydrALAZINE Injectable 10 milliGRAM(s) IV Push every 1 hour PRN  influenza   Vaccine 0.5 milliLiter(s) IntraMuscular once  insulin lispro (HumaLOG) corrective regimen sliding scale   SubCutaneous Before meals and at bedtime  levETIRAcetam 500 milliGRAM(s) Oral two times a day  ondansetron Injectable 4 milliGRAM(s) IV Push every 6 hours PRN  oxyCODONE    IR 5 milliGRAM(s) Oral every 4 hours PRN  pantoprazole    Tablet 40 milliGRAM(s) Oral before breakfast      Lab      Radiology    Assessment- Brain mass     Plan Keppra - as per NS

## 2019-02-03 NOTE — PROGRESS NOTE ADULT - ASSESSMENT
48y/F with  1.  bilateral frontal masses, brain compression, cerebral edema s/p open brain biopsy (02/01/2019, Dr. Garcia)  2.  fibroids  3.  hepatic steatosis    PLAN:   NEURO: neurochecks q4h, PRN pain meds with tylenol, opiates, LFTs normalized  s/p biopsy:  f/u final histopathology, f/u MRI, steroids taper over 2 weeks   seizure prophylaxis: levetiracetam 500mg IV BID   REHAB:  physical therapy evaluation and management    EARLY MOB:  OOB to chair, ambulate    PULM:  room air, incentive spirometry  CARDIO:  SBP goal 100-150mm Hg  ENDO:  Blood sugar goals 140-180 mg/dL, insulin sliding scale  GI:  cont PPI for GI prophylaxis while on steroids  DIET: regular diet  RENAL:  IVL  HEM/ONC: Hb stable  VTE Prophylaxis: SCDs only, SQL tonight if staying  ID: afebrile, no leukocytosis   Social: will update family  DISPO PLANNING    ATTENDING ATTESTATION:  I was physically present for the key portions of the evaluation and management (E/M) service provided.  I agree with the above history, physical and plan which I have reviewed and edited where appropriate.     Patient not at high risk for neurologic deterioration / death.  Time spent on this noncritically ill patient: 45 minutes spent on total encounter, more than 50% of the visit was spent counseling and/or coordinating care by the attending physician.    Plan discussed with RN, house staff.

## 2019-02-03 NOTE — DISCHARGE NOTE ADULT - HOSPITAL COURSE
48F no pmhx presented to [facility] ER at the end of december  after having headache and vision changes x1 month.   Originally, symptoms came and went and were worse upon wakening in the morning and are temporarily relieved with advil. She states shes has also had blurry vision and has been seeing floaters in her peripheral vision (she describes them seeing a bug fly at her from the side, but when she looks she does not see a bug).     Since this time, she has had worsening hallucinations where she thinks there are hawks/snakes in the room, but she is aware that they are not real.   She is not certain if this is related to her medications.  She is unsteady on her feet, she states that some days are better than others with walking.    Underwent right frontal stereotactic craniotomy for tumor resection and biopsy, pathology pending (2/1/19). No perioperative complications. Post-op CT head and MRI Brain without contrast was performed and reviewed. Patient tolerating ambulation and PO diet. Evaluated by PT and OT and cleared to go home.

## 2019-02-03 NOTE — DISCHARGE NOTE ADULT - PLAN OF CARE
Discharge home See below - You have fingerstick blood glucose measurements that were high at times. Please follow up with your primary care physician for long term management. You are on a decadron taper, elevation in fingersticks could be steroid related. See below  - discussed with Dr. Garcia, should stop steroids (in the setting of high fingersticks glucose) and to stop Keppra - You have fingerstick blood glucose measurements that were high at times. Please follow up with your primary care physician for long term management. You were on a decadron taper, elevation in fingersticks could be steroid related. Resolution of symptoms - You were seen by psychiatry during hospitalization, you have capacity and are discharge ready from psych point of view. Please take Risperidal once a day at bedtime to help with hellucinations and insomnia. Please follow up with psych clinic at [facility] as outpatient, please call the following number immediately upon discharge to [facility] an appointment: [phone]

## 2019-02-03 NOTE — PROGRESS NOTE ADULT - SUBJECTIVE AND OBJECTIVE BOX
HPI:  48F no pmhx presented to [facility] ER at the end of december  after having headache and vision changes x1 month.   Originally, symptoms came and went and were worse upon wakening in the morning and are temporarily relieved with advil. She states shes has also had blurry vision and has been seeing floaters in her peripheral vision (she describes them seeing a bug fly at her from the side, but when she looks she does not see a bug).     Since this time, she has had worsening hallucinations where she thinks there are hawks/snakes in the room, but she is aware that they are not real.   She is not certain if this is related to her medications.  She is unsteady on her feet, she states that some days are better than others with walking. (01 Feb 2019 08:49)    OVERNIGHT EVENTS: Reports some incisional pain and right facial swelling. No other events overnight.    Hospital Course:  POD# 1: WILMA overnight. Neuro stable. Given cepacol for sore throat.  POD# 2: CT Head post-op done, reviewed. Cleared by PT/OT for discharge home.    Vital Signs Last 24 Hrs  T(C): 37.1 (03 Feb 2019 09:28), Max: 37.3 (02 Feb 2019 14:00)  T(F): 98.8 (03 Feb 2019 09:28), Max: 99.2 (02 Feb 2019 14:00)  HR: 58 (03 Feb 2019 08:11) (58 - 90)  BP: 111/57 (03 Feb 2019 08:11) (96/63 - 120/55)  BP(mean): 77 (03 Feb 2019 08:11) (75 - 87)  RR: 18 (03 Feb 2019 08:11) (15 - 18)  SpO2: 97% (03 Feb 2019 08:11) (97% - 100%)    I&O's Summary    02 Feb 2019 07:01  -  03 Feb 2019 07:00  --------------------------------------------------------  IN: 600 mL / OUT: 350 mL / NET: 250 mL    03 Feb 2019 07:01  -  03 Feb 2019 10:56  --------------------------------------------------------  IN: 360 mL / OUT: 300 mL / NET: 60 mL        PHYSICAL EXAM:  Gen: NAD, AAOx3  HEENT: PERRL. EOMI. VF intact. Right frontal scalp incision C/D/I. Right facial edema.  Neck: FROM, nontender  Lungs: Clear b/l  Heart: S1, S2. NSR.  Abd: Soft, NT/ND. +BS  Exts: Pulses 2+ throughout  Neuro: CNs II-XII intact. 5/5 str x4 extremities. Sensation to LT intact. Speech clear. Following commands.    TUBES/LINES:  [] Elliott  [] Lumbar Drain  [] Wound Drains  [] Others      DIET:  [] NPO  [x] Mechanical  [] Tube feeds    LABS:                        11.0   5.4   )-----------( 189      ( 01 Feb 2019 12:45 )             32.9     02-02    140  |  108  |  9   ----------------------------<  162<H>  4.0   |  22  |  0.63    Ca    9.3      02 Feb 2019 03:47    TPro  7.1  /  Alb  3.6  /  TBili  0.2  /  DBili  x   /  AST  17  /  ALT  25  /  AlkPhos  56  02-01    PT/INR - ( 01 Feb 2019 12:45 )   PT: 13.0 sec;   INR: 1.15          PTT - ( 01 Feb 2019 12:45 )  PTT:24.6 sec        CAPILLARY BLOOD GLUCOSE      POCT Blood Glucose.: 136 mg/dL (03 Feb 2019 06:51)  POCT Blood Glucose.: 245 mg/dL (02 Feb 2019 22:09)  POCT Blood Glucose.: 157 mg/dL (02 Feb 2019 16:40)  POCT Blood Glucose.: 190 mg/dL (02 Feb 2019 11:45)      Drug Levels: [] N/A    CSF Analysis: [] N/A      Allergies    penicillins (Unknown)  sulfa drugs (Unknown)    Intolerances      MEDICATIONS:  Antibiotics:    Neuro:  levETIRAcetam 500 milliGRAM(s) Oral two times a day  ondansetron Injectable 4 milliGRAM(s) IV Push every 6 hours PRN  oxyCODONE    IR 5 milliGRAM(s) Oral every 4 hours PRN    Anticoagulation:    OTHER:  benzocaine 15 mG/menthol 3.6 mG Lozenge 1 Lozenge Oral three times a day PRN  dexamethasone     Tablet 4 milliGRAM(s) Oral every 6 hours  dextrose 40% Gel 15 Gram(s) Oral once PRN  dextrose 50% Injectable 12.5 Gram(s) IV Push once  dextrose 50% Injectable 25 Gram(s) IV Push once  dextrose 50% Injectable 25 Gram(s) IV Push once  docusate sodium 100 milliGRAM(s) Oral daily  glucagon  Injectable 1 milliGRAM(s) IntraMuscular once PRN  hydrALAZINE Injectable 10 milliGRAM(s) IV Push every 1 hour PRN  influenza   Vaccine 0.5 milliLiter(s) IntraMuscular once  insulin lispro (HumaLOG) corrective regimen sliding scale   SubCutaneous Before meals and at bedtime  pantoprazole    Tablet 40 milliGRAM(s) Oral before breakfast    IVF:    CULTURES:    RADIOLOGY & ADDITIONAL TESTS:        ASSESSMENT:  48y Female with history of frontal brain mass now s/p stereotactic right frontal craniotomy for partial frontal lobectomy, frozen: low grade glioma POD#2.      PLAN:  NEURO: CT/MR done post op, reviewed w/ Dr. Garcia  Continue Decadron taper x2 weeks,  Continue Keppra for seizure prophylaxis,  Pain meds PRN  Neuro checks    CARDIOVASCULAR: Normotensive BP goals,  Labs reviewed    PULMONARY: Satting well on RA, IS as tolerated    RENAL: Voiding    GI: PO diet, stool softeners  PPI while on steroids    ID: Afebrile, no antibiotics    ENDO: ISS    DVT PROPHYLAXIS:  SCDs, no SQH if going home    DISPOSITION:   Plan for DC home today,  Cleared by PT/OT,  D/w Dr. Garcia

## 2019-02-03 NOTE — DISCHARGE NOTE ADULT - PATIENT PORTAL LINK FT
You can access the [facility] Patient Portal, offered by [facility], by registering with the following website: [email or web address]

## 2019-02-03 NOTE — DISCHARGE NOTE ADULT - MEDICATION SUMMARY - MEDICATIONS TO TAKE
I will START or STAY ON the medications listed below when I get home from the hospital:    dexamethasone 2 mg oral tablet  -- 2 tabs PO every 6 hrs x3 days,  2 tabs every 8 hrs x3 days,  1 tab every 8 hrs x3 days,  1 tab every 12 hrs x3 days  -- It is very important that you take or use this exactly as directed.  Do not skip doses or discontinue unless directed by your doctor.  Obtain medical advice before taking any non-prescription drugs as some may affect the action of this medication.  Take with food or milk.    -- Indication: For BRAINMASS G93.9    traMADol 50 mg oral tablet  -- orally 2 times a day  -- Indication: For BRAINMASS G93.9    oxyCODONE 5 mg oral tablet  -- 1 tab(s) by mouth every 6 hours, As Needed  -- Indication: For BRAINMASS G93.9    Colace 100 mg oral capsule  -- 1 cap(s) by mouth once a day  -- Indication: For BRAINMASS G93.9    pantoprazole 40 mg oral delayed release tablet  -- 1 tab(s) by mouth once a day (before a meal) MDD:1  -- Indication: For BRAINMASS G93.9 I will START or STAY ON the medications listed below when I get home from the hospital:    dexamethasone 2 mg oral tablet  -- 2 tabs PO every 6 hrs x3 days,  2 tabs every 8 hrs x3 days,  1 tab every 8 hrs x3 days,  1 tab every 12 hrs x3 days  -- It is very important that you take or use this exactly as directed.  Do not skip doses or discontinue unless directed by your doctor.  Obtain medical advice before taking any non-prescription drugs as some may affect the action of this medication.  Take with food or milk.    -- Indication: For Do not take (note the order below)    dexamethasone 2 mg oral tablet  -- 2 tabs PO every 6 hrs x2 days,  2 tabs every 8 hrs 2 days  2 tab every 12 hrs x1 days  2 q 24 x 1 day  -- It is very important that you take or use this exactly as directed.  Do not skip doses or discontinue unless directed by your doctor.  Obtain medical advice before taking any non-prescription drugs as some may affect the action of this medication.  Take with food or milk.    -- Indication: For BRAINMASS G93.9    oxyCODONE 5 mg oral tablet  -- 1 tab(s) by mouth every 6 hours, As Needed -for moderate pain MDD:4 tab   -- Indication: For Moderate pain    traMADol 50 mg oral tablet  -- 1 tab(s) by mouth 2 times a day, As Needed -for moderate pain  for pain MDD:2  tabs   -- Indication: For Moderate pain     Colace 100 mg oral capsule  -- 1 cap(s) by mouth once a day, As Needed -for constipation   -- Indication: For constipation     Protonix 40 mg oral delayed release tablet  -- 1 tab(s) by mouth once a day (before a meal) MDD:1 tab  -- Indication: For Ulcer prophylaxis I will START or STAY ON the medications listed below when I get home from the hospital:    dexamethasone 2 mg oral tablet  -- 2 tabs PO every 8 hrs x1 day  2 tabs every 12 hrs x1 days,  1 tab every 12 hrs x1 days,  -- It is very important that you take or use this exactly as directed.  Do not skip doses or discontinue unless directed by your doctor.  Obtain medical advice before taking any non-prescription drugs as some may affect the action of this medication.  Take with food or milk.    -- Indication: For Stop     oxyCODONE 5 mg oral tablet  -- 1 tab(s) by mouth every 6 hours, As Needed -for moderate pain MDD:4 tab   -- Indication: For Moderate pain    traMADol 50 mg oral tablet  -- 1 tab(s) by mouth 2 times a day, As Needed -for moderate pain  for pain MDD:2  tabs   -- Indication: For Moderate pain     Colace 100 mg oral capsule  -- 1 cap(s) by mouth once a day, As Needed -for constipation   -- Indication: For constipation     Protonix 40 mg oral delayed release tablet  -- 1 tab(s) by mouth once a day (before a meal) MDD:1 tab  -- Indication: For Ulcer prophylaxis I will START or STAY ON the medications listed below when I get home from the hospital:    dexamethasone 2 mg oral tablet  -- 2 tabs PO every 8 hrs x1 day  2 tabs every 12 hrs x1 days,  1 tab every 12 hrs x1 days,  -- It is very important that you take or use this exactly as directed.  Do not skip doses or discontinue unless directed by your doctor.  Obtain medical advice before taking any non-prescription drugs as some may affect the action of this medication.  Take with food or milk.    -- Indication: For Stop     oxyCODONE 5 mg oral tablet  -- 1 tab(s) by mouth every 6 hours, As Needed -for moderate pain MDD:4 tab   -- Indication: For Moderate pain    traMADol 50 mg oral tablet  -- 1 tab(s) by mouth 2 times a day, As Needed -for moderate pain  for pain MDD:2  tabs   -- Indication: For Moderate pain     RisperDAL 1 mg oral tablet  -- 1 tab(s) by mouth once a day (at bedtime) MDD:1 tab  -- It is very important that you take or use this exactly as directed.  Do not skip doses or discontinue unless directed by your doctor.  May cause drowsiness.  Alcohol may intensify this effect.  Use care when operating dangerous machinery.  Obtain medical advice before taking any non-prescription drugs as some may affect the action of this medication.    -- Indication: For Hellucinations    Colace 100 mg oral capsule  -- 1 cap(s) by mouth once a day, As Needed -for constipation   -- Indication: For constipation     Protonix 40 mg oral delayed release tablet  -- 1 tab(s) by mouth once a day (before a meal) MDD:1 tab  -- Indication: For Ulcer prophylaxis

## 2019-02-03 NOTE — DISCHARGE NOTE ADULT - NS AS ACTIVITY OBS
Walking-Indoors allowed/Showering allowed/Walking-Outdoors allowed/Stairs allowed/No Heavy lifting/straining/Do not drive or operate machinery/Do not make important decisions

## 2019-02-03 NOTE — DISCHARGE NOTE ADULT - CARE PROVIDER_API CALL
Dao Garcia)  Neurological Surgery  [address], [place] [address]  Phone: [phone]  Fax: [phone]

## 2019-02-03 NOTE — DISCHARGE NOTE ADULT - MEDICATION SUMMARY - MEDICATIONS TO STOP TAKING
I will STOP taking the medications listed below when I get home from the hospital:    Keppra 500 mg oral tablet  -- 1 tab(s) by mouth once a day   -- Check with your doctor before becoming pregnant.  It is very important that you take or use this exactly as directed.  Do not skip doses or discontinue unless directed by your doctor.  May cause drowsiness or dizziness.  Obtain medical advice before taking any non-prescription drugs as some may affect the action of this medication.  Swallow whole.  Do not crush.  This drug may impair the ability to drive or operate machinery.  Use care until you become familiar with its effects.

## 2019-02-03 NOTE — DISCHARGE NOTE ADULT - ADDITIONAL INSTRUCTIONS
1. You must wash your hair starting 24 hours after being home. Use your normal  shampoo. This keeps your wound clean and helps healing. You should shampoo everyday.  2. Mild swelling around the incision is common. Keep incision open to air and dry.  3. Call our office at [phone] to set up the appointment with an NP for wound check  once you get home.  4. Inform the doctor immediately if you have a fever (above 101), chills, night  sweats, wound drainage, increasing wound redness or pain, nausea, vomiting, or  worsening headache.  B. ACTIVITY LEVEL  1. Fatigue is common following brain surgery, rest if you are tired.  2. You should get up and walk around every hour during the daytime.  3. No bending, lifting or twisting for the first 3 weeks, but walking is  recommended.  4. Drink plenty of water, stay out of the sun.  You may be given a narcotic pain reliever such as Percocet  (oxycodone-acetaminophen). This is for short term use. Avoid use of alcohol when taking these meds.

## 2019-02-03 NOTE — DISCHARGE NOTE ADULT - CARE PLAN
Principal Discharge DX:	Brain mass  Goal:	Discharge home  Assessment and plan of treatment:	See below Principal Discharge DX:	Brain mass  Goal:	Discharge home  Assessment and plan of treatment:	See below  Secondary Diagnosis:	Elevated blood sugar  Assessment and plan of treatment:	- You have fingerstick blood glucose measurements that were high at times. Please follow up with your primary care physician for long term management. You are on a decadron taper, elevation in fingersticks could be steroid related. Principal Discharge DX:	Brain mass  Goal:	Discharge home  Assessment and plan of treatment:	See below  - discussed with Dr. Garcia, should stop steroids (in the setting of high fingersticks glucose) and to stop Keppra  Secondary Diagnosis:	Elevated blood sugar  Assessment and plan of treatment:	- You have fingerstick blood glucose measurements that were high at times. Please follow up with your primary care physician for long term management. You were on a decadron taper, elevation in fingersticks could be steroid related. Principal Discharge DX:	Brain mass  Goal:	Discharge home  Assessment and plan of treatment:	See below  - discussed with Dr. Garcia, should stop steroids (in the setting of high fingersticks glucose) and to stop Keppra  Secondary Diagnosis:	Elevated blood sugar  Assessment and plan of treatment:	- You have fingerstick blood glucose measurements that were high at times. Please follow up with your primary care physician for long term management. You were on a decadron taper, elevation in fingersticks could be steroid related.  Secondary Diagnosis:	Hallucinations, visual  Goal:	Resolution of symptoms  Assessment and plan of treatment:	- You were seen by psychiatry during hospitalization, you have capacity and are discharge ready from psych point of view. Please take Risperidal once a day at bedtime to help with hellucinations and insomnia. Please follow up with psych clinic at [facility] as outpatient, please call the following number immediately upon discharge to [facility] an appointment: [phone]

## 2019-02-04 LAB
ALBUMIN SERPL ELPH-MCNC: 4.3 G/DL
ALP BLD-CCNC: 60 U/L
ALT SERPL-CCNC: 79 U/L
ANION GAP SERPL CALC-SCNC: 13 MMOL/L
AST SERPL-CCNC: 30 U/L
BILIRUB SERPL-MCNC: 0.4 MG/DL
BUN SERPL-MCNC: 16 MG/DL
CALCIUM SERPL-MCNC: 10.5 MG/DL
CHLORIDE SERPL-SCNC: 104 MMOL/L
CO2 SERPL-SCNC: 23 MMOL/L
CREAT SERPL-MCNC: 0.85 MG/DL
GLUCOSE BLDC GLUCOMTR-MCNC: 153 MG/DL — HIGH (ref 70–99)
GLUCOSE BLDC GLUCOMTR-MCNC: 161 MG/DL — HIGH (ref 70–99)
GLUCOSE BLDC GLUCOMTR-MCNC: 165 MG/DL — HIGH (ref 70–99)
GLUCOSE BLDC GLUCOMTR-MCNC: 283 MG/DL — HIGH (ref 70–99)
GLUCOSE SERPL-MCNC: 90 MG/DL
HBV CORE IGG+IGM SER QL: NONREACTIVE
HBV SURFACE AB SER QL: NONREACTIVE
HBV SURFACE AG SER QL: NONREACTIVE
HCV AB SER QL: NONREACTIVE
HCV S/CO RATIO: 0.1 S/CO
HEPATITIS A IGG ANTIBODY: NONREACTIVE
POTASSIUM SERPL-SCNC: 4.7 MMOL/L
PROT SERPL-MCNC: 8.1 G/DL
SODIUM SERPL-SCNC: 140 MMOL/L

## 2019-02-04 RX ADMIN — Medication 2: at 22:06

## 2019-02-04 RX ADMIN — Medication 100 MILLIGRAM(S): at 11:59

## 2019-02-04 RX ADMIN — Medication 2: at 07:20

## 2019-02-04 RX ADMIN — Medication 4 MILLIGRAM(S): at 02:44

## 2019-02-04 RX ADMIN — Medication 6: at 12:00

## 2019-02-04 RX ADMIN — PANTOPRAZOLE SODIUM 40 MILLIGRAM(S): 20 TABLET, DELAYED RELEASE ORAL at 06:30

## 2019-02-04 RX ADMIN — LEVETIRACETAM 500 MILLIGRAM(S): 250 TABLET, FILM COATED ORAL at 17:11

## 2019-02-04 RX ADMIN — Medication 4 MILLIGRAM(S): at 11:31

## 2019-02-04 RX ADMIN — Medication 1 SPRAY(S): at 06:30

## 2019-02-04 RX ADMIN — Medication 4 MILLIGRAM(S): at 18:41

## 2019-02-04 RX ADMIN — Medication 2: at 17:11

## 2019-02-04 RX ADMIN — LEVETIRACETAM 500 MILLIGRAM(S): 250 TABLET, FILM COATED ORAL at 06:30

## 2019-02-04 NOTE — PROGRESS NOTE ADULT - SUBJECTIVE AND OBJECTIVE BOX
HPI:  48F no pmhx presented to [facility] ER at the end of december  after having headache and vision changes x1 month.   Originally, symptoms came and went and were worse upon wakening in the morning and are temporarily relieved with advil. She states shes has also had blurry vision and has been seeing floaters in her peripheral vision (she describes them seeing a bug fly at her from the side, but when she looks she does not see a bug).     Since this time, she has had worsening hallucinations where she thinks there are hawks/snakes in the room, but she is aware that they are not real.   She is not certain if this is related to her medications.  She is unsteady on her feet, she states that some days are better than others with walking. (01 Feb 2019 08:49)        Hospital Course:  POD# 1: WILMA overnight. Neuro stable. Given cepacol for sore throat.  POD# 2: CT Head post-op done, reviewed. Cleared by PT/OT for discharge home.  Had a one time episode of dizziness while walking the hallways.  Discharged delayed.  POD# 3: No acute events overnight. plan for D/C today if asymptomatic    OVERNIGHT EVENTS:  Vital Signs Last 24 Hrs  T(C): 36.8 (03 Feb 2019 22:28), Max: 37.1 (03 Feb 2019 09:28)  T(F): 98.3 (03 Feb 2019 22:28), Max: 98.8 (03 Feb 2019 09:28)  HR: 74 (04 Feb 2019 01:25) (58 - 98)  BP: 130/69 (04 Feb 2019 01:25) (96/63 - 130/69)  BP(mean): 92 (04 Feb 2019 01:25) (75 - 92)  RR: 18 (04 Feb 2019 01:25) (16 - 19)  SpO2: 98% (04 Feb 2019 01:25) (97% - 100%)    I&O's Summary    02 Feb 2019 07:01  -  03 Feb 2019 07:00  --------------------------------------------------------  IN: 600 mL / OUT: 350 mL / NET: 250 mL    03 Feb 2019 07:01  -  04 Feb 2019 03:22  --------------------------------------------------------  IN: 360 mL / OUT: 1100 mL / NET: -740 mL        PHYSICAL EXAM:  Neck: FROM, nontender  Lungs: Clear b/l  Heart: S1, S2. NSR.  Abd: Soft, NT/ND. +BS  Exts: Pulses 2+ throughout  Neuro:   AAOX3. Verbal function intact  Cranial Nerves: II-XII intact  Motor: 5/5 power in b/l UE and LE  Sensation: intact to touch in all extremities  Pronator Drift: ***  Dysmetria: ***  Incision/Wound: Right frontal scalp incision C/D/I. Right facial edema.    TUBES/LINES:  none    DIET:  [] Mechanical      Allergies    penicillins (Unknown)  sulfa drugs (Unknown)    Intolerances      MEDICATIONS:  Antibiotics:    Neuro:  levETIRAcetam 500 milliGRAM(s) Oral two times a day  ondansetron Injectable 4 milliGRAM(s) IV Push every 6 hours PRN  oxyCODONE    IR 5 milliGRAM(s) Oral every 4 hours PRN    Anticoagulation:    OTHER:  benzocaine 15 mG/menthol 3.6 mG Lozenge 1 Lozenge Oral three times a day PRN  dexamethasone     Tablet 4 milliGRAM(s) Oral every 6 hours  dextrose 40% Gel 15 Gram(s) Oral once PRN  dextrose 50% Injectable 12.5 Gram(s) IV Push once  dextrose 50% Injectable 25 Gram(s) IV Push once  dextrose 50% Injectable 25 Gram(s) IV Push once  docusate sodium 100 milliGRAM(s) Oral daily  glucagon  Injectable 1 milliGRAM(s) IntraMuscular once PRN  hydrALAZINE Injectable 10 milliGRAM(s) IV Push every 1 hour PRN  influenza   Vaccine 0.5 milliLiter(s) IntraMuscular once  insulin lispro (HumaLOG) corrective regimen sliding scale   SubCutaneous Before meals and at bedtime  pantoprazole    Tablet 40 milliGRAM(s) Oral before breakfast  sodium chloride 0.65% Nasal 1 Spray(s) Both Nostrils every 8 hours PRN      BRAINMASS G93.9  No pertinent family history in first degree relatives  Handoff  MEWS Score  Brain mass  Fibroids  No pertinent past medical history  Brain neoplasm  Brain neoplasm  Brain biopsy, open  Brain mass  Brain biopsy, open  H/O hysterectomy with unilateral oophorectomy  No significant past surgical history  No significant past surgical history      ASSESSMENT:  48y Female with history of frontal brain mass now s/p stereotactic right frontal craniotomy for partial frontal lobectomy, frozen: low grade glioma. Plan for d/c today.      PLAN:  NEURO:  Continue Decadron taper x2 weeks,  Continue Keppra for seizure prophylaxis,  Pain meds PRN  Neuro checks    CARDIOVASCULAR: Normotensive BP goals,  Labs reviewed    PULMONARY: Satting well on RA, IS as tolerated    RENAL: Voiding    GI: PO diet, stool softeners  PPI while on steroids    ID: Afebrile, no antibiotics    ENDO: ISS    DVT PROPHYLAXIS:  SCDs, no SQH if going home    DISPOSITION:   Plan for DC home today,  Cleared by PT/OT,  D/w Dr. Garcia

## 2019-02-05 VITALS
OXYGEN SATURATION: 98 % | RESPIRATION RATE: 16 BRPM | HEART RATE: 72 BPM | DIASTOLIC BLOOD PRESSURE: 68 MMHG | SYSTOLIC BLOOD PRESSURE: 111 MMHG

## 2019-02-05 LAB
GLUCOSE BLDC GLUCOMTR-MCNC: 173 MG/DL — HIGH (ref 70–99)
GLUCOSE BLDC GLUCOMTR-MCNC: 224 MG/DL — HIGH (ref 70–99)

## 2019-02-05 PROCEDURE — 97116 GAIT TRAINING THERAPY: CPT

## 2019-02-05 PROCEDURE — 86901 BLOOD TYPING SEROLOGIC RH(D): CPT

## 2019-02-05 PROCEDURE — 70450 CT HEAD/BRAIN W/O DYE: CPT

## 2019-02-05 PROCEDURE — 80061 LIPID PANEL: CPT

## 2019-02-05 PROCEDURE — 80053 COMPREHEN METABOLIC PANEL: CPT

## 2019-02-05 PROCEDURE — C1889: CPT

## 2019-02-05 PROCEDURE — 88307 TISSUE EXAM BY PATHOLOGIST: CPT

## 2019-02-05 PROCEDURE — 88341 IMHCHEM/IMCYTCHM EA ADD ANTB: CPT

## 2019-02-05 PROCEDURE — 88360 TUMOR IMMUNOHISTOCHEM/MANUAL: CPT

## 2019-02-05 PROCEDURE — 97162 PT EVAL MOD COMPLEX 30 MIN: CPT

## 2019-02-05 PROCEDURE — 82962 GLUCOSE BLOOD TEST: CPT

## 2019-02-05 PROCEDURE — 83036 HEMOGLOBIN GLYCOSYLATED A1C: CPT

## 2019-02-05 PROCEDURE — 97535 SELF CARE MNGMENT TRAINING: CPT

## 2019-02-05 PROCEDURE — 97530 THERAPEUTIC ACTIVITIES: CPT

## 2019-02-05 PROCEDURE — 80048 BASIC METABOLIC PNL TOTAL CA: CPT

## 2019-02-05 PROCEDURE — 85610 PROTHROMBIN TIME: CPT

## 2019-02-05 PROCEDURE — C1713: CPT

## 2019-02-05 PROCEDURE — 88331 PATH CONSLTJ SURG 1 BLK 1SPC: CPT

## 2019-02-05 PROCEDURE — 86850 RBC ANTIBODY SCREEN: CPT

## 2019-02-05 PROCEDURE — 70552 MRI BRAIN STEM W/DYE: CPT

## 2019-02-05 PROCEDURE — 86900 BLOOD TYPING SEROLOGIC ABO: CPT

## 2019-02-05 PROCEDURE — 85730 THROMBOPLASTIN TIME PARTIAL: CPT

## 2019-02-05 PROCEDURE — 36415 COLL VENOUS BLD VENIPUNCTURE: CPT

## 2019-02-05 PROCEDURE — 99223 1ST HOSP IP/OBS HIGH 75: CPT

## 2019-02-05 PROCEDURE — 85025 COMPLETE CBC W/AUTO DIFF WBC: CPT

## 2019-02-05 PROCEDURE — A9585: CPT

## 2019-02-05 PROCEDURE — 70551 MRI BRAIN STEM W/O DYE: CPT

## 2019-02-05 PROCEDURE — 88342 IMHCHEM/IMCYTCHM 1ST ANTB: CPT

## 2019-02-05 RX ORDER — DOCUSATE SODIUM 100 MG
1 CAPSULE ORAL
Qty: 0 | Refills: 0 | COMMUNITY

## 2019-02-05 RX ORDER — OXYCODONE HYDROCHLORIDE 5 MG/1
1 TABLET ORAL
Qty: 0 | Refills: 0 | COMMUNITY

## 2019-02-05 RX ORDER — TRAMADOL HYDROCHLORIDE 50 MG/1
1 TABLET ORAL
Qty: 14 | Refills: 0
Start: 2019-02-05 | End: 2019-02-11

## 2019-02-05 RX ORDER — DEXAMETHASONE 0.5 MG/5ML
2 ELIXIR ORAL
Qty: 24 | Refills: 0
Start: 2019-02-05 | End: 2019-02-07

## 2019-02-05 RX ORDER — DEXAMETHASONE 0.5 MG/5ML
2 ELIXIR ORAL
Qty: 48 | Refills: 0 | OUTPATIENT
Start: 2019-02-05 | End: 2019-02-10

## 2019-02-05 RX ORDER — DOCUSATE SODIUM 100 MG
1 CAPSULE ORAL
Qty: 14 | Refills: 0
Start: 2019-02-05 | End: 2019-02-18

## 2019-02-05 RX ORDER — HEPARIN SODIUM 5000 [USP'U]/ML
7500 INJECTION INTRAVENOUS; SUBCUTANEOUS EVERY 8 HOURS
Qty: 0 | Refills: 0 | Status: DISCONTINUED | OUTPATIENT
Start: 2019-02-05 | End: 2019-02-05

## 2019-02-05 RX ORDER — DEXAMETHASONE 0.5 MG/5ML
4 ELIXIR ORAL EVERY 8 HOURS
Qty: 0 | Refills: 0 | Status: DISCONTINUED | OUTPATIENT
Start: 2019-02-05 | End: 2019-02-05

## 2019-02-05 RX ORDER — OXYCODONE HYDROCHLORIDE 5 MG/1
1 TABLET ORAL
Qty: 20 | Refills: 0
Start: 2019-02-05 | End: 2019-02-09

## 2019-02-05 RX ORDER — PANTOPRAZOLE SODIUM 20 MG/1
1 TABLET, DELAYED RELEASE ORAL
Qty: 14 | Refills: 0
Start: 2019-02-05 | End: 2019-02-18

## 2019-02-05 RX ORDER — DEXAMETHASONE 0.5 MG/5ML
2 ELIXIR ORAL
Qty: 57 | Refills: 0 | OUTPATIENT
Start: 2019-02-05 | End: 2019-02-18

## 2019-02-05 RX ORDER — TRAMADOL HYDROCHLORIDE 50 MG/1
0 TABLET ORAL
Qty: 0 | Refills: 0 | COMMUNITY

## 2019-02-05 RX ORDER — RISPERIDONE 4 MG/1
1 TABLET ORAL
Qty: 14 | Refills: 0
Start: 2019-02-05 | End: 2019-02-18

## 2019-02-05 RX ADMIN — OXYCODONE HYDROCHLORIDE 5 MILLIGRAM(S): 5 TABLET ORAL at 00:27

## 2019-02-05 RX ADMIN — Medication 4 MILLIGRAM(S): at 14:38

## 2019-02-05 RX ADMIN — Medication 2: at 06:54

## 2019-02-05 RX ADMIN — PANTOPRAZOLE SODIUM 40 MILLIGRAM(S): 20 TABLET, DELAYED RELEASE ORAL at 06:17

## 2019-02-05 RX ADMIN — LEVETIRACETAM 500 MILLIGRAM(S): 250 TABLET, FILM COATED ORAL at 06:17

## 2019-02-05 RX ADMIN — Medication 4 MILLIGRAM(S): at 00:27

## 2019-02-05 RX ADMIN — Medication 4: at 16:40

## 2019-02-05 RX ADMIN — Medication 4 MILLIGRAM(S): at 06:17

## 2019-02-05 RX ADMIN — OXYCODONE HYDROCHLORIDE 5 MILLIGRAM(S): 5 TABLET ORAL at 01:30

## 2019-02-05 NOTE — PROGRESS NOTE ADULT - SUBJECTIVE AND OBJECTIVE BOX
Neurology Follow up note    Name  BRITTANY CARTER    HPI:  48F no pmhx presented to [facility] ER at the end of december  after having headache and vision changes x1 month.   Originally, symptoms came and went and were worse upon wakening in the morning and are temporarily relieved with advil. She states shes has also had blurry vision and has been seeing floaters in her peripheral vision (she describes them seeing a bug fly at her from the side, but when she looks she does not see a bug).     Since this time, she has had worsening hallucinations where she thinks there are hawks/snakes in the room, but she is aware that they are not real.   She is not certain if this is related to her medications.  She is unsteady on her feet, she states that some days are better than others with walking. (01 Feb 2019 08:49)      Interval History - mild headache - no new numbness or tingling        REVIEW OF SYSTEMS    Vital Signs Last 24 Hrs  T(C): 35.8 (05 Feb 2019 09:33), Max: 36.6 (04 Feb 2019 13:16)  T(F): 96.4 (05 Feb 2019 09:33), Max: 97.9 (04 Feb 2019 13:16)  HR: 68 (05 Feb 2019 08:26) (60 - 86)  BP: 113/55 (05 Feb 2019 08:26) (104/77 - 128/76)  BP(mean): 76 (05 Feb 2019 08:26) (76 - 95)  RR: 16 (05 Feb 2019 08:26) (16 - 18)  SpO2: 96% (05 Feb 2019 08:26) (96% - 98%)    Physical Exam-     Mental Status- awake and alert    Cranial Nerves- full EOM    Gait and station- n/a     Motor- moves all 4 extremities    Reflexes- decreased    Sensation- no sensory level    Coordination- no tremors    Vascular - no bruits    Medications  benzocaine 15 mG/menthol 3.6 mG Lozenge 1 Lozenge Oral three times a day PRN  dexamethasone     Tablet 4 milliGRAM(s) Oral every 8 hours  dextrose 40% Gel 15 Gram(s) Oral once PRN  dextrose 50% Injectable 12.5 Gram(s) IV Push once  dextrose 50% Injectable 25 Gram(s) IV Push once  dextrose 50% Injectable 25 Gram(s) IV Push once  docusate sodium 100 milliGRAM(s) Oral daily  glucagon  Injectable 1 milliGRAM(s) IntraMuscular once PRN  heparin  Injectable 7500 Unit(s) SubCutaneous every 8 hours  hydrALAZINE Injectable 10 milliGRAM(s) IV Push every 1 hour PRN  influenza   Vaccine 0.5 milliLiter(s) IntraMuscular once  insulin lispro (HumaLOG) corrective regimen sliding scale   SubCutaneous Before meals and at bedtime  levETIRAcetam 500 milliGRAM(s) Oral two times a day  ondansetron Injectable 4 milliGRAM(s) IV Push every 6 hours PRN  oxyCODONE    IR 5 milliGRAM(s) Oral every 4 hours PRN  pantoprazole    Tablet 40 milliGRAM(s) Oral before breakfast  sodium chloride 0.65% Nasal 1 Spray(s) Both Nostrils every 8 hours PRN      Lab      Radiology    Assessment- Brain mass - no new focal deficit     Plan as per NS

## 2019-02-05 NOTE — BEHAVIORAL HEALTH ASSESSMENT NOTE - NSBHSUICPROTECTFACT_PSY_A_CORE
Identifies reasons for living/Supportive social network or family/Fear of death or dying due to pain/suffering/Responsibility to family and others/Future oriented

## 2019-02-05 NOTE — BEHAVIORAL HEALTH ASSESSMENT NOTE - HPI (INCLUDE ILLNESS QUALITY, SEVERITY, DURATION, TIMING, CONTEXT, MODIFYING FACTORS, ASSOCIATED SIGNS AND SYMPTOMS)
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 Patient had been having VH of hawks and snakes in her apartment, trouble walking, blurred vision, and increasing paranoia. Pt still having VH but now they are more like illusions, e.g she mistakes a wire coming out of the TV set for a bug. Sister at bedside reports she is still paranoid also. Pt is in a fairly good mood and taking everything in stride including craniotomy and ongoing hallucinations. Since surgery patient has been compulsively watching [employer] videos of worms and other parasites being removed from hosts such as humans or dogs. "I can't stop watching. I'll watch it all night." When asked why she was doing this or what she was getting out of it, she said she felt relief when the worm or botfly had been removed, like a cavity being fixed. Pt had never had urges to watch such videos before. Patient openly states she does not feel herself and is a little anxious about returning home but looking forward to leaving the hospital. Sister more apprehensive about her return and is worried about her personality changes, her hallucinations, and her sister falling at home. Sister will be taking care of her for the next two weeks. Both pt and sister agree pt's hallucinations worsened with oxycontin. Pt had bene on keppra for seizure prophylaxis but that's been stopped. Will be on steroids for 2 more days. Pt denies emotional lability with steroids but does have a raging appetite.

## 2019-02-05 NOTE — BEHAVIORAL HEALTH ASSESSMENT NOTE - NSBHADMITCOUNSEL_PSY_A_CORE
instructions for management, treatment and follow up/risk factor reduction/client/family/caregiver education/risks and benefits of treatment options/prognosis/diagnostic results/impressions and/or recommended studies/importance of adherence to chosen treatment

## 2019-02-05 NOTE — BEHAVIORAL HEALTH ASSESSMENT NOTE - NSBHCONSULTFOLLOWAFTERCARE_PSY_A_CORE FT
1)Pt is not a danger to self or others at this time and is psychiatrically cleared for discharge.    2)Would start risperdal 1 mg qhs to help with hallucinations, paranoia, and poor sleep. Risks and benefits of starting risperdal discussed with pt & sister.     3)Pt also having OCD like symptoms which is how I interpret having to watch youtube videos of parasite removal all night. There have been case reports of patients developing OCD sx after right frontal lobe removal (Todd et. al. [facility] Psychiatry 36 (2014) 450.[id number].) Would monitor for continued OCD sx and consider starting SSRI such as prozac, zoloft, or luvox. Did not want to start it right now as I didn't want to start two new psych meds at the same time.     4)Refer to [facility] outpatient psychiatric clinic at [address] [place], [place] [address], [phone]. Will try to get her an appointment and call her with it.

## 2019-02-05 NOTE — BEHAVIORAL HEALTH ASSESSMENT NOTE - DIFFERENTIAL
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

## 2019-02-05 NOTE — BEHAVIORAL HEALTH ASSESSMENT NOTE - SUMMARY
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 Patient had been having VH of hawks and snakes in her apartment, trouble walking, blurred vision, and increasing paranoia. Pt still having VH but now they are more like illusions, e.g she mistakes a wire coming out of the TV set for a bug. Sister at bedside reports she is still paranoid also. Pt is in a fairly good mood and taking everything in stride including craniotomy and ongoing hallucinations. Since surgery patient has been compulsively watching youtube videos of worms and other parasites being removed from hosts such as humans or dogs. "I can't stop watching. I'll watch it all night." When asked why she was doing this or what she was getting out of it, she said she felt relief when the worm or botfly had been removed, like a cavity being fixed. Pt had never had urges to watch such videos before. Patient openly states she does not feel herself and is a little anxious about returning home but looking forward to leaving the hospital. Sister more apprehensive about her return and is worried about her personality changes, her hallucinations, and her sister falling at home. Sister will be taking care of her for the next two weeks. Both pt and sister agree pt's hallucinations worsened with oxycontin. Pt had bene on keppra for seizure prophylaxis but that's been stopped. Will be on steroids for 2 more days. Pt denies emotional lability with steroids but does have a raging appetite.     1)Pt is not a danger to self or others at this time and is psychiatrically cleared for discharge.    2)Would start risperdal 1 mg qhs to help with hallucinations, paranoia, and poor sleep. Risks and benefits of starting risperdal discussed with pt & sister.     3)Pt also having OCD like symptoms which is how I interpret having to watch youtube videos of parasite removal all night. There have been case reports of patients developing OCD sx after right frontal lobe removal (Todd et. al. [facility] Psychiatry 36 (2014) [id number].) Would monitor for continued OCD sx and consider starting SSRI such as prozac, zoloft, or luvox. Did not want to start it right now as I didn't want to start two new psych meds at the same time.     4)Refer to [facility] outpatient psychiatric clinic at [address] [place], [place] [address], [phone]. Will try to get her an appointment and call her with it.

## 2019-02-05 NOTE — BEHAVIORAL HEALTH ASSESSMENT NOTE - NSBHCONSULTRECOMMENDOTHER_PSY_A_CORE FT
1)Pt is not a danger to self or others at this time and is psychiatrically cleared for discharge.    2)Would start risperdal 1 mg qhs to help with hallucinations, paranoia, and poor sleep. Risks and benefits of starting risperdal discussed with pt & sister.     3)Pt also having OCD like symptoms which is how I interpret having to watch youtube videos of parasite removal all night. There have been case reports of patients developing OCD sx after right frontal lobe removal (Todd et. al. [facility] Psychiatry 36 (2014) 450.[id number].) Would monitor for continued OCD sx and consider starting SSRI such as prozac, zoloft, or luvox. Did not want to start it right now as I didn't want to start two new psych meds at the same time.     4)Refer to [facility] outpatient psychiatric clinic at [address] [place], [place] [address], [phone]. Will try to get her an appointment and call her with it.

## 2019-02-05 NOTE — PROGRESS NOTE ADULT - SUBJECTIVE AND OBJECTIVE BOX
HPI:  48F no pmhx presented to [facility] ER at the end of december  after having headache and vision changes x1 month.   Originally, symptoms came and went and were worse upon wakening in the morning and are temporarily relieved with advil. She states shes has also had blurry vision and has been seeing floaters in her peripheral vision (she describes them seeing a bug fly at her from the side, but when she looks she does not see a bug).     Since this time, she has had worsening hallucinations where she thinks there are hawks/snakes in the room, but she is aware that they are not real.   She is not certain if this is related to her medications.  She is unsteady on her feet, she states that some days are better than others with walking. (01 Feb 2019 08:49)    OVERNIGHT EVENTS: WILMA overnight, neuro stable. Dispo pending     Hospital Course:  POD# 1: WILMA overnight. Neuro stable. Given cepacol for sore throat.  POD# 2: CT Head post-op done, reviewed. Cleared by PT/OT for discharge home.  Had a one time episode of dizziness while walking the hallways.  Discharged delayed.  POD# 3: No acute events overnight. plan for D/C today if asymptomatic  POD #4: WILMA overnight, neuro stable. Dispo pending     Vital Signs Last 24 Hrs  T(C): 36.5 (04 Feb 2019 21:46), Max: 36.6 (04 Feb 2019 09:23)  T(F): 97.7 (04 Feb 2019 21:46), Max: 97.9 (04 Feb 2019 13:16)  HR: 70 (04 Feb 2019 20:33) (56 - 86)  BP: 107/66 (04 Feb 2019 20:33) (100/63 - 130/69)  BP(mean): 82 (04 Feb 2019 20:33) (74 - 95)  RR: 18 (04 Feb 2019 20:33) (17 - 18)  SpO2: 98% (04 Feb 2019 20:33) (98% - 99%)    I&O's Summary    03 Feb 2019 07:01  -  04 Feb 2019 07:00  --------------------------------------------------------  IN: 360 mL / OUT: 1100 mL / NET: -740 mL    04 Feb 2019 07:01  -  05 Feb 2019 00:10  --------------------------------------------------------  IN: 0 mL / OUT: 800 mL / NET: -800 mL        PHYSICAL EXAM:  Gen: laying in hospital bed, NAD  Neurological: AA+Ox3, OE spontaneously, FC  CN II-XII: PERRL, EOMI  Motor exam: MAEx4, 5/5 strength throughout  Cardiovascular: regular rate and rhythm  Respiratory: clear to auscultation  Gastrointestinal: soft, nontender, nondistended    TUBES/LINES:  [] Elliott  [] Lumbar Drain  [] Wound Drains  [] Others      DIET:  [] NPO  [] Mechanical  [] Tube feeds    LABS:                  CAPILLARY BLOOD GLUCOSE      POCT Blood Glucose.: 161 mg/dL (04 Feb 2019 21:39)  POCT Blood Glucose.: 165 mg/dL (04 Feb 2019 16:55)  POCT Blood Glucose.: 283 mg/dL (04 Feb 2019 11:37)  POCT Blood Glucose.: 153 mg/dL (04 Feb 2019 06:53)      Drug Levels: [] N/A    CSF Analysis: [] N/A      Allergies    penicillins (Unknown)  sulfa drugs (Unknown)    Intolerances      MEDICATIONS:  Antibiotics:    Neuro:  levETIRAcetam 500 milliGRAM(s) Oral two times a day  ondansetron Injectable 4 milliGRAM(s) IV Push every 6 hours PRN  oxyCODONE    IR 5 milliGRAM(s) Oral every 4 hours PRN    Anticoagulation:    OTHER:  benzocaine 15 mG/menthol 3.6 mG Lozenge 1 Lozenge Oral three times a day PRN  dexamethasone     Tablet 4 milliGRAM(s) Oral every 6 hours  dextrose 40% Gel 15 Gram(s) Oral once PRN  dextrose 50% Injectable 12.5 Gram(s) IV Push once  dextrose 50% Injectable 25 Gram(s) IV Push once  dextrose 50% Injectable 25 Gram(s) IV Push once  docusate sodium 100 milliGRAM(s) Oral daily  glucagon  Injectable 1 milliGRAM(s) IntraMuscular once PRN  hydrALAZINE Injectable 10 milliGRAM(s) IV Push every 1 hour PRN  influenza   Vaccine 0.5 milliLiter(s) IntraMuscular once  insulin lispro (HumaLOG) corrective regimen sliding scale   SubCutaneous Before meals and at bedtime  pantoprazole    Tablet 40 milliGRAM(s) Oral before breakfast  sodium chloride 0.65% Nasal 1 Spray(s) Both Nostrils every 8 hours PRN    IVF:    CULTURES:    RADIOLOGY & ADDITIONAL TESTS:      ASSESSMENT:  48y Female with history of frontal brain mass now s/p stereotactic right frontal craniotomy for partial frontal lobectomy, frozen: low grade glioma.     PLAN:  - neuro checks  - vitals checks  - pain control  - Dec taper x2 weeks  - cont Keppra  - regular diet  - bowel regimen  - GI ppx: Protonix   - DVT PROPHYLAXIS: [x] Venodynes  [] Heparin/Lovenox  - PT/OT/OOB    DISPOSITION: full code, dispo pending    d/w Dr. Garcia

## 2019-02-05 NOTE — BEHAVIORAL HEALTH ASSESSMENT NOTE - PATIENT'S CHIEF COMPLAINT
"I am seeing things...like that thing coming out of the TV set is moving. I think it could be bugs."

## 2019-02-06 LAB — SURGICAL PATHOLOGY STUDY: SIGNIFICANT CHANGE UP

## 2019-02-07 RX ORDER — ACETAMINOPHEN 325 MG/1
TABLET, FILM COATED ORAL
Refills: 0 | Status: DISCONTINUED | COMMUNITY
End: 2019-02-07

## 2019-02-11 ENCOUNTER — APPOINTMENT (OUTPATIENT)
Dept: RADIATION ONCOLOGY | Facility: CLINIC | Age: 49
End: 2019-02-11
Payer: COMMERCIAL

## 2019-02-11 ENCOUNTER — APPOINTMENT (OUTPATIENT)
Dept: NEUROLOGY | Facility: CLINIC | Age: 49
End: 2019-02-11
Payer: COMMERCIAL

## 2019-02-11 ENCOUNTER — APPOINTMENT (OUTPATIENT)
Dept: NEUROSURGERY | Facility: CLINIC | Age: 49
End: 2019-02-11
Payer: COMMERCIAL

## 2019-02-11 VITALS
HEART RATE: 65 BPM | BODY MASS INDEX: 35.84 KG/M2 | OXYGEN SATURATION: 100 % | DIASTOLIC BLOOD PRESSURE: 72 MMHG | HEIGHT: 69 IN | WEIGHT: 242 LBS | RESPIRATION RATE: 16 BRPM | SYSTOLIC BLOOD PRESSURE: 105 MMHG | TEMPERATURE: 97.8 F

## 2019-02-11 VITALS
OXYGEN SATURATION: 100 % | HEIGHT: 69 IN | DIASTOLIC BLOOD PRESSURE: 82 MMHG | WEIGHT: 242 LBS | BODY MASS INDEX: 35.84 KG/M2 | HEART RATE: 62 BPM | SYSTOLIC BLOOD PRESSURE: 118 MMHG

## 2019-02-11 DIAGNOSIS — R44.1 VISUAL HALLUCINATIONS: ICD-10-CM

## 2019-02-11 DIAGNOSIS — Z80.42 FAMILY HISTORY OF MALIGNANT NEOPLASM OF PROSTATE: ICD-10-CM

## 2019-02-11 DIAGNOSIS — D49.9 NEOPLASM OF UNSPECIFIED BEHAVIOR OF UNSPECIFIED SITE: ICD-10-CM

## 2019-02-11 DIAGNOSIS — C71.9 MALIGNANT NEOPLASM OF BRAIN, UNSPECIFIED: ICD-10-CM

## 2019-02-11 DIAGNOSIS — R94.5 ABNORMAL RESULTS OF LIVER FUNCTION STUDIES: ICD-10-CM

## 2019-02-11 PROCEDURE — 99213 OFFICE O/P EST LOW 20 MIN: CPT

## 2019-02-11 PROCEDURE — 99024 POSTOP FOLLOW-UP VISIT: CPT

## 2019-02-11 PROCEDURE — ZZZZZ: CPT

## 2019-02-11 PROCEDURE — 99205 OFFICE O/P NEW HI 60 MIN: CPT | Mod: 25

## 2019-02-11 RX ORDER — IBUPROFEN 600 MG/1
600 TABLET, FILM COATED ORAL 3 TIMES DAILY
Qty: 60 | Refills: 1 | Status: DISCONTINUED | COMMUNITY
Start: 2019-01-17 | End: 2019-02-11

## 2019-02-11 RX ORDER — OXYCODONE 5 MG/1
5 TABLET ORAL
Qty: 21 | Refills: 0 | Status: DISCONTINUED | COMMUNITY
Start: 2019-01-23 | End: 2019-02-11

## 2019-02-11 RX ORDER — TRAMADOL HYDROCHLORIDE 50 MG/1
50 TABLET, COATED ORAL
Qty: 60 | Refills: 0 | Status: DISCONTINUED | COMMUNITY
Start: 2019-01-15 | End: 2019-02-11

## 2019-02-11 RX ORDER — LEVETIRACETAM 500 MG/1
500 TABLET, FILM COATED ORAL TWICE DAILY
Qty: 60 | Refills: 1 | Status: DISCONTINUED | COMMUNITY
Start: 2019-01-21 | End: 2019-02-11

## 2019-02-11 RX ORDER — LEVETIRACETAM 1000 MG/1
TABLET, FILM COATED ORAL
Refills: 0 | Status: DISCONTINUED | COMMUNITY
End: 2019-02-11

## 2019-02-11 NOTE — PHYSICAL EXAM
[FreeTextEntry1] : She is awake and alert - oriented to February, 2018. President.\par She is able to name and follow commands.\par She is able to repeat.\par She can spell the word HOUSE forwards and backwards.\par Memory is 2/3 at 5 minutes\par PERRL, EOMI, VFF\par Face is symmetric and tongue protrudes midline.\par There is no pronator drift\par FFM are equal bilaterally\par Strength is full in all 4 limbs\par Coordination is intact to Penny and FNF\par She is ambulating with a moderate base - slightly unsteady.

## 2019-02-11 NOTE — DATA REVIEWED
[de-identified] : EXAM: CT BRAIN \par \par PROCEDURE DATE: 02/02/2019 \par \par \par \par INTERPRETATION: Axial images were obtained from the skull base to the \par cranial vertex without intravenous contrast. Soft tissue and bone algorithm \par images were evaluated. \par \par Clinical information: Postop status post biopsy of brain mass. \par \par The current study is compared with prior head CT of 12/21/2018. \par \par There has been interval right frontal craniotomy with underlying small \par volume pneumocephalus. There is no evidence of acute intracranial hemorrhage \par or large extracerebral collection. There is a similar pattern of abnormal \par low attenuation in the bilateral frontal and temporal lobes with marked \par thickening of the corpus callosum, as previously described. There is no \par hydrocephalus or midline shift. There is again evident a small dural based \par calcification in the left temporal region, nonspecific and unchanged. \par Visualized paranasal sinuses and mastoids are clear. \par \par IMPRESSION: \par \par Expected postoperative changes following right frontal craniotomy, as above. \par \par \par \par \par \par "Thank you for the opportunity to participate in the care of this patient." \par \par \par \par INDRA LEAL M.D., ATTENDING RADIOLOGIST \par This document has been electronically signed. Feb 3 2019 4:40PM \par \par \par \par

## 2019-02-11 NOTE — REASON FOR VISIT
[Consideration of Curative Therapy] : consideration of curative therapy for [Brain Tumor] : brain tumor [Other: _____] : [unfilled]

## 2019-02-11 NOTE — ASSESSMENT
[FreeTextEntry1] : 1. Plan of care explained in great details Regarding Newly Diagnosed High Grade Glioma-Astrocytoma. Family will think about plan in relation of possible relocation of pt to [place]\par Appointment with Dr. Valdes today at 12 noon followed by appointment with  to discuss plan of care.\par 2. Consult with Dr. Valdes/Kyle regarding restarting Keppra. Currently pt is off Keppra and has not had a seizure.\par 3. Empathetic  support provided. Multiple questions answered.\par \par

## 2019-02-11 NOTE — DISCUSSION/SUMMARY
[FreeTextEntry1] : In summary, this is a 49 yo woman s/p open biopsy of a high grade diffuse tumor.\par Headaches have resolved - she is still on Decadron 2 mg tid.\par Molecular markers are still pending to see if she is eligible for clinical trial.\par Regardless, she will need standard of care therapy with STUPP protocol.\par Risks and benefits of Temodar were discussed with the patient and her sister.\par They are not sure of their plans - whether she will stay in [place] or move to [place] to be closer to family.\par They will call us in the next few days to let us know.\par \par Physical therapy prescription was given and she was instructed to not walk alone.\par I have also recommended that she not lower her Decadron below 2 mg bid until definitive treatment has started.\par

## 2019-02-11 NOTE — REVIEW OF SYSTEMS
[Leg Weakness] : leg weakness [Poor Coordination] : poor coordination [Abnormal Sensation] : an abnormal sensation [Difficulty Walking] : difficulty walking [As Noted in HPI] : as noted in HPI [Negative] : Endocrine [Confused or Disoriented] : no confusion [Memory Lapses or Loss] : no memory loss [Seizures] : no convulsions [Lightheadedness] : no lightheadedness [Vertigo] : no vertigo [Migraine Headache] : no migraine headache [Arthralgias] : no arthralgias [de-identified] : Hallucination reported [FreeTextEntry9] : Difficulty walking

## 2019-02-11 NOTE — HISTORY OF PRESENT ILLNESS
[FreeTextEntry1] : Ms. Yolanda Montes is a 47 yo woman who presented to [facility] with intermittent headache and visual obscurations on 12/21/18 - symptoms had been present for about a month and were worse in the morning. CT head showed bilateral frontal and temporal edema, right more than, left and she was transferred to [facility] for diagnosis and management.\par \par MRI brain 12/22/18 showed extensive flair change in the bilateral frontal and temporal lobes without enhancement with expansion of the corpus callosum.\par \par LP 12/23 - cytology negative; flow cytometry did not have adequate cells.\par \par On 2/1/19, she underwent a right frontal craniotomy and brain biopsy.\par \par Pathology: HIgh grade astrocytoma, IDH wt.\par Molecular studies are still pending.\par She is here to discuss adjuvant therapy.\par \par She denies any headache or focal weakness. Admits to some gait instability - no falls.

## 2019-02-11 NOTE — PHYSICAL EXAM
[General Appearance - Alert] : alert [General Appearance - In No Acute Distress] : in no acute distress [Transverse] : transverse [Irregular] : irregular [Healing Well] : healing well [No Drainage] : without drainage [Normal Skin] : normal [Oriented To Time, Place, And Person] : oriented to person, place, and time [Impaired Insight] : insight and judgment were intact [Cranial Nerves Optic (II)] : visual acuity intact bilaterally,  pupils equal round and reactive to light [Cranial Nerves Oculomotor (III)] : extraocular motion intact [Cranial Nerves Trigeminal (V)] : facial sensation intact symmetrically [Cranial Nerves Facial (VII)] : face symmetrical [Cranial Nerves Vestibulocochlear (VIII)] : hearing was intact bilaterally [Cranial Nerves Glossopharyngeal (IX)] : tongue and palate midline [Cranial Nerves Accessory (XI - Cranial And Spinal)] : head turning and shoulder shrug symmetric [Cranial Nerves Hypoglossal (XII)] : there was no tongue deviation with protrusion [Sensation Tactile Decrease] : light touch was intact [Limited Balance] : the patient's balance was impaired [Sclera] : the sclera and conjunctiva were normal [Outer Ear] : the ears and nose were normal in appearance [] : no respiratory distress [Exaggerated Use Of Accessory Muscles For Inspiration] : no accessory muscle use [Heart Rate And Rhythm] : heart rate was normal and rhythm regular [Abdomen Soft] : soft [Skin Color & Pigmentation] : normal skin color and pigmentation [FreeTextEntry1] : Unsteady gait and difficulty walking

## 2019-02-11 NOTE — REASON FOR VISIT
[de-identified] : S/P Craniotomy for resection biopsy of Brain Mass [de-identified] : 2/1/19 [de-identified] : 10 [de-identified] : Here for Discussion regrading plan of care for newly Diagnosed High Grade Glioma.\par She reports ongoing difficulty with ambulation and progressive weakness in her legs. She comes in via Wheelchair and is accompanied by her sisters who lives out of state.\par Her incision is healing well without signs of infection. She reports that Keppra was stopped due to hallucinations and she denies any seizures.\par \par TUMOR BOARD DISCUSSION 2/11/19\par Radiology: Diffuse glioma, postop procedural changes\par PAthology:Infiltrating Glioma WHO GRADE III; Need final Pathology\par PLAN: See Dr. Wright and Dr. Valdes & Len, STUPP protocol.

## 2019-02-12 PROBLEM — C71.9: Status: ACTIVE | Noted: 2019-02-12

## 2019-02-12 NOTE — DATA REVIEWED
[FreeTextEntry1] : I have personally reviewed all relevant imaging studies (MRI) and I have discussed the case with the referring physician and in neuro-oncology tumor bed.\par

## 2019-02-12 NOTE — DISEASE MANAGEMENT
[Clinical] : TNM Stage: c [N/A] : Currently not applicable [FreeTextEntry4] : at least WHO grade III glioma of the bilateral frontal lobes [TTNM] : - [NTNM] : - [MTNM] : -

## 2019-02-12 NOTE — REVIEW OF SYSTEMS
[Patient Intake Form Reviewed] : Patient intake form was reviewed [Fatigue] : fatigue [Difficulty Walking] : difficulty walking [Anxiety] : anxiety [Hot Flashes] : hot flashes [Negative] : Allergic/Immunologic [Eye Pain] : no eye pain [Red Eyes] : eyes not red [Dry Eyes] : no dryness of the eyes [Dysphagia] : no dysphagia [Loss of Hearing] : no loss of hearing [Confused] : no confusion [Suicidal] : not suicidal [FreeTextEntry3] : Denies diplopia, blurry vision.  [FreeTextEntry4] : throat dryness [FreeTextEntry7] : +indigestion [FreeTextEntry9] : unbalanced walking.  [de-identified] : right frontal incision [de-identified] : impaired gait 2/2 imbalance [de-identified] : +worried, poor sleep

## 2019-02-12 NOTE — HISTORY OF PRESENT ILLNESS
[FreeTextEntry1] : Ms. Yolanda Montes is a 47yo F referred by Dr. Dao Garcia and Dr. Chan for consideration of radiation therapy for brain tumor. \par \par She initially presented to [facility] on 12/21/18 for intermittent headaches followed by vision changes (blurry vision, floaters) x 4- 6 weeks, noted to be worse in the morning. CT head was performed at [facility] demonstrating some bilateral frontal and temporal vasogenic edema R>L concerning for underlying mass, thus patient was transferred to [facility] for MRI and further workup. \par \par MRI Brain done 12/22/18 showed the following:\par There is extensive T1 and T2 prolongation without enhancement in the bilateral frontal and temporal lobes and within the expanded corpus callosum. Findings are felt most compatible with diffuse low-grade glioma. Clinical course is atypical for HSV infection, though if there is clinical concern, correlation with PCR is recommended. \par \par CSF cytology done 12/23/18 was negative for malignant cells. Flow cytometry did not reveal adequate number of cells. \par \par During pre-op testing, she was noted to have elevated LFTs; she followed up with GI and the elevated LFTs were attributed to Tylenol toxicity. \par \par She underwent right frontal craniotomy for brain biopsy on 2/1/19. Pathology revealed the following:\par 1. Brain tumor, biopsy: Brain tissue with infiltrating glioma.\par 2. The tumor cavity, biopsy: Brain tissue with focally involve infiltrating glioma\par 3. Right frontal brain tumor, resection: High grade glioma with astrocytic morphology. \par \par Note: Sections show an infiltrating glioma with astrocytic morphology with prominent subpial condensation exhibiting mild to\par moderate atypia, elevated mitotic activity and lacking pseudopalisading necrosis or microvascular proliferation. Immunostains show tumor cells are positive for GFAP and are negative for IDH1 (R132H).  P53 highlights rare tumor cells.  Ki-67 shows a proliferation index of approximately 10%. \par **Molecular studies are pending and results will be reported in an addendum.\par \par Post-op MRI done 2/2/19 showed the following:\par 1. Status post right frontal craniotomy for biopsy of a right frontal lobe lesion with expected postoperative changes including hemorrhagic fluid and pneumocephalus subjacent to the craniotomy in the extra-axial compartment as well as hemorrhagic fluid and pneumocephalus within the right frontal lobe excisional cavity. Triangular-shaped abnormal diffusion signal along the posterior margin of excisional cavity may represent devitalized brain parenchyma.\par 2. No change in diffuse masslike, abnormal T2 prolongation within the frontal lobes, insular region, and anteromedial temporal lobes \par bilaterally with additional basal ganglia and thalamic involvement and marked involvement of the corpus callosum. 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 Tumor Board consensus was for Stupp protocol, and consideration of enrollment into other studies pending results of Molecular studies. \par \par Today, she reports headaches have "improved dramatically" since surgery. She has had two mild headaches of short duration since surgery. She also reports feeling vertiginous and unsteady on her feet. Furthermore, she reports having indigestion since starting Decadron (currently on a taper, should be finished in 6 days), takes Protonix for this. She denies vision abnormalities, seizure activity, confusion, or any other c/o. \par \par Of note, she lives in [place] Her two sisters are helping her currently, but she usually lives on her own. She expressed interest in receiving RT closer to home. \par

## 2019-02-14 DIAGNOSIS — G93.5 COMPRESSION OF BRAIN: ICD-10-CM

## 2019-02-14 DIAGNOSIS — C71.1 MALIGNANT NEOPLASM OF FRONTAL LOBE: ICD-10-CM

## 2019-02-14 DIAGNOSIS — R44.1 VISUAL HALLUCINATIONS: ICD-10-CM

## 2019-02-14 DIAGNOSIS — R22.0 LOCALIZED SWELLING, MASS AND LUMP, HEAD: ICD-10-CM

## 2019-02-14 DIAGNOSIS — F06.8 OTHER SPECIFIED MENTAL DISORDERS DUE TO KNOWN PHYSIOLOGICAL CONDITION: ICD-10-CM

## 2019-02-14 DIAGNOSIS — K76.0 FATTY (CHANGE OF) LIVER, NOT ELSEWHERE CLASSIFIED: ICD-10-CM

## 2019-02-14 DIAGNOSIS — G93.6 CEREBRAL EDEMA: ICD-10-CM

## 2019-02-14 DIAGNOSIS — R42 DIZZINESS AND GIDDINESS: ICD-10-CM

## 2019-02-15 ENCOUNTER — OTHER (OUTPATIENT)
Age: 49
End: 2019-02-15

## 2019-02-19 ENCOUNTER — MEDICATION RENEWAL (OUTPATIENT)
Age: 49
End: 2019-02-19

## 2019-02-19 ENCOUNTER — APPOINTMENT (OUTPATIENT)
Dept: NEUROSURGERY | Facility: CLINIC | Age: 49
End: 2019-02-19

## 2019-02-21 ENCOUNTER — MEDICATION RENEWAL (OUTPATIENT)
Age: 49
End: 2019-02-21

## 2019-02-21 ENCOUNTER — CLINICAL ADVICE (OUTPATIENT)
Age: 49
End: 2019-02-21

## 2019-02-28 NOTE — HISTORY OF PRESENT ILLNESS
[FreeTextEntry1] : 48 year old F, presented with severe headaches in Jan 2018, significant FLAIR signal noted on MRI bilateral frontal lobes, R>L. Patient had LP = Neg. Scheduled for open biopsy, taken to OR on 2/1. Pathology suggestive of high grade glioma. IDH WT, p53 Neg.

## 2019-02-28 NOTE — ASSESSMENT
[FreeTextEntry1] : This is a 48 year old female with a newly diagnosed high grade glioma based on open biopsy on 2/1/18. Her pathological dx is WHO III, IDH WT, p53 Neg. Her KPS is currently 100. She will need adjuvant chemo and radiation which will be arranged through the [facility] Rad/Onc office. She may also be a candidate for a clinical trial, specifically the Upfront [place] Avastin trial - her candidacy will be determined. I have discussed all risks, alternatives, and benefits of the prescribed treatment plan. All questions were answered. She was in agreement with the treatment plan. I will see her in follow-up periodically, specifically in the next 2-3 months. In the interm, she will need to see Dr. Valdes/Len for Rad/Onc.

## 2019-02-28 NOTE — REASON FOR VISIT
[FreeTextEntry1] : 48 year old F, presented with severe headaches in Jan 2018, significant FLAIR signal noted on MRI bilateral frontal lobes, R>L. Patient had LP = Neg. Scheduled for open biopsy, taken to OR on 2/1. Pathology suggestive of high grade glioma. IDH WT, p53 Neg. Here today for post-op follow-up

## 2019-03-02 ENCOUNTER — TRANSCRIPTION ENCOUNTER (OUTPATIENT)
Age: 49
End: 2019-03-02

## 2019-08-11 NOTE — PHYSICAL THERAPY INITIAL EVALUATION ADULT - LEVEL OF CONSCIOUSNESS, REHAB EVAL
Pharyngitis (Sore Throat), Report Pending    Pharyngitis (sore throat) is often due to a virus. It can also be caused by streptococcus (strep), bacteria. This is often called strep throat. Both viral and strep infections can cause throat pain that is worse when swallowing, aching all over, headache, and fever. Both types of infections are contagious. They may be spread by coughing, kissing, or touching others after touching your mouth or nose.  A test has been done to find out if you or your child have strep throat. Call this facility or your healthcare provider if you were not given your test results. If the test is positive for strep infection, you will need to take antibiotic medicines. A prescription can be called into your pharmacy at that time. If the test is negative, you probably have a viral pharyngitis. This does not need to be treated with antibiotics. Until you receive the results of the strep test, you should stay home from work. If your child is being tested, he or she should stay home from school.  Home care  · Rest at home. Drink plenty of fluids so you won't get dehydrated.  · If the test is positive for strep, you or your child should not go to work or school for the first 2 days of taking the antibiotics. After this time, you or your child will not be contagious. You or your child can then return to work or school when feeling better.   · Use the antibiotic medicine for the full 10 days. Do not stop the medicine even if you or your child feel better. This is very important to make sure the infection is fully treated. It is also important to prevent medicine-resistant germs from growing. If you or your child were given an antibiotic shot, no more antibiotics are needed.  · Use throat lozenges or numbing throat sprays to help reduce pain. Gargling with warm salt water will also help reduce throat pain. Dissolve 1/2 teaspoon of salt in 1 glass of warm water. Children can sip on juice or a popsicle.  Children 5 years and older can also suck on a lollipop or hard candy.  · Don't eat salty or spicy foods or give them to your child. These can irritate the throat.  Other medicine for a child: You can give your child acetaminophen for fever, fussiness, or discomfort. In babies over 6 months of age, you may use ibuprofen instead of acetaminophen. If your child has chronic liver or kidney disease or ever had a stomach ulcer or GI bleeding, talk with your child’s healthcare provider before giving these medicines. Aspirin should never be used by any child under 18 years of age who has a fever. It may cause severe liver damage.  Other medicine for an adult: You may use acetaminophen or ibuprofen to control pain or fever, unless another medicine was prescribed for this. If you have chronic liver or kidney disease or ever had a stomach ulcer or GI bleeding, talk with your healthcare provider before using these medicines.  Follow-up care  Follow up with your healthcare provider or our staff if you or your child don't get better over the next week.  When to seek medical advice  Call your healthcare provider right away if any of these occur:  · Fever as directed by your healthcare provider. For children, seek care if:  ? Your child is of any age and has repeated fevers above 104°F (40°C).  ? Your child is younger than 2 years of age and has a fever of 100.4°F (38°C) for more than 1 day.  ? Your child is 2 years old or older and has a fever of 100.4°F (38°C) for more than 3 days.  · New or worsening ear pain, sinus pain, or headache  · Painful lumps in the back of neck  · Stiff neck  · Lymph nodes are getting larger  · •Can’t swallow liquids, a lot of drooling, or can’t open mouth wide due to throat pain  · Signs of dehydration, such as very dark urine or no urine, sunken eyes, dizziness  · Trouble breathing or noisy breathing  · Muffled voice  · New rash  · Other symptoms getting worse  Prevention  Here are steps you can take  to help prevent an infection:  · Keep good hand washing habits.  · Don’t have close contact with people who have sore throats, colds, or other upper respiratory infections.  · Don’t smoke, and stay away from secondhand smoke.  · Stay up to date with of your vaccines.  Date Last Reviewed: 11/1/2017  © 7344-9821 [employer], [employer]. [address] [place], [place] [id number]. All rights reserved. This information is not intended as a substitute for professional medical care. Always follow your healthcare professional's instructions.         alert

## 2019-10-23 ENCOUNTER — INPATIENT (INPATIENT)
Facility: HOSPITAL | Age: 49
LOS: 11 days | Discharge: HOME HEALTH | DRG: 856 | End: 2019-11-04
Attending: NEUROLOGICAL SURGERY | Admitting: NEUROLOGICAL SURGERY
Payer: COMMERCIAL

## 2019-10-23 ENCOUNTER — TRANSCRIPTION ENCOUNTER (OUTPATIENT)
Age: 49
End: 2019-10-23

## 2019-10-23 VITALS
OXYGEN SATURATION: 98 % | WEIGHT: 179.9 LBS | SYSTOLIC BLOOD PRESSURE: 116 MMHG | DIASTOLIC BLOOD PRESSURE: 76 MMHG | RESPIRATION RATE: 18 BRPM | TEMPERATURE: 97 F | HEART RATE: 86 BPM

## 2019-10-23 DIAGNOSIS — R09.89 OTHER SPECIFIED SYMPTOMS AND SIGNS INVOLVING THE CIRCULATORY AND RESPIRATORY SYSTEMS: ICD-10-CM

## 2019-10-23 DIAGNOSIS — T81.30XA DISRUPTION OF WOUND, UNSPECIFIED, INITIAL ENCOUNTER: ICD-10-CM

## 2019-10-23 DIAGNOSIS — Z90.710 ACQUIRED ABSENCE OF BOTH CERVIX AND UTERUS: Chronic | ICD-10-CM

## 2019-10-23 LAB
ANION GAP SERPL CALC-SCNC: 13 MMOL/L — SIGNIFICANT CHANGE UP (ref 5–17)
BASOPHILS # BLD AUTO: 0.02 K/UL — SIGNIFICANT CHANGE UP (ref 0–0.2)
BASOPHILS NFR BLD AUTO: 0.9 % — SIGNIFICANT CHANGE UP (ref 0–2)
BUN SERPL-MCNC: 9 MG/DL — SIGNIFICANT CHANGE UP (ref 7–23)
CALCIUM SERPL-MCNC: 10.1 MG/DL — SIGNIFICANT CHANGE UP (ref 8.4–10.5)
CHLORIDE SERPL-SCNC: 104 MMOL/L — SIGNIFICANT CHANGE UP (ref 96–108)
CO2 SERPL-SCNC: 24 MMOL/L — SIGNIFICANT CHANGE UP (ref 22–31)
CREAT SERPL-MCNC: 0.52 MG/DL — SIGNIFICANT CHANGE UP (ref 0.5–1.3)
EOSINOPHIL # BLD AUTO: 0.02 K/UL — SIGNIFICANT CHANGE UP (ref 0–0.5)
EOSINOPHIL NFR BLD AUTO: 0.9 % — SIGNIFICANT CHANGE UP (ref 0–6)
EXTRA BLUE TOP TUBE: SIGNIFICANT CHANGE UP
EXTRA GREEN TOP TUBE: SIGNIFICANT CHANGE UP
GLUCOSE SERPL-MCNC: 125 MG/DL — HIGH (ref 70–99)
HCT VFR BLD CALC: 31.9 % — LOW (ref 34.5–45)
HGB BLD-MCNC: 10.8 G/DL — LOW (ref 11.5–15.5)
LYMPHOCYTES # BLD AUTO: 0.58 K/UL — LOW (ref 1–3.3)
LYMPHOCYTES # BLD AUTO: 21 % — SIGNIFICANT CHANGE UP (ref 13–44)
MCHC RBC-ENTMCNC: 33.9 GM/DL — SIGNIFICANT CHANGE UP (ref 32–36)
MCHC RBC-ENTMCNC: 36.4 PG — HIGH (ref 27–34)
MCV RBC AUTO: 107.4 FL — HIGH (ref 80–100)
MONOCYTES # BLD AUTO: 0.19 K/UL — SIGNIFICANT CHANGE UP (ref 0–0.9)
MONOCYTES NFR BLD AUTO: 7 % — SIGNIFICANT CHANGE UP (ref 2–14)
NEUTROPHILS # BLD AUTO: 1.91 K/UL — SIGNIFICANT CHANGE UP (ref 1.8–7.4)
NEUTROPHILS NFR BLD AUTO: 67.5 % — SIGNIFICANT CHANGE UP (ref 43–77)
PLATELET # BLD AUTO: 49 K/UL — LOW (ref 150–400)
POTASSIUM SERPL-MCNC: 4.4 MMOL/L — SIGNIFICANT CHANGE UP (ref 3.5–5.3)
POTASSIUM SERPL-SCNC: 4.4 MMOL/L — SIGNIFICANT CHANGE UP (ref 3.5–5.3)
RBC # BLD: 2.97 M/UL — LOW (ref 3.8–5.2)
RBC # FLD: 12.7 % — SIGNIFICANT CHANGE UP (ref 10.3–14.5)
SODIUM SERPL-SCNC: 141 MMOL/L — SIGNIFICANT CHANGE UP (ref 135–145)
WBC # BLD: 2.75 K/UL — LOW (ref 3.8–10.5)
WBC # FLD AUTO: 2.75 K/UL — LOW (ref 3.8–10.5)

## 2019-10-23 PROCEDURE — 71045 X-RAY EXAM CHEST 1 VIEW: CPT | Mod: 26

## 2019-10-23 PROCEDURE — 93010 ELECTROCARDIOGRAM REPORT: CPT

## 2019-10-23 PROCEDURE — 99285 EMERGENCY DEPT VISIT HI MDM: CPT

## 2019-10-23 PROCEDURE — 99222 1ST HOSP IP/OBS MODERATE 55: CPT

## 2019-10-23 RX ORDER — ONDANSETRON 8 MG/1
4 TABLET, FILM COATED ORAL EVERY 6 HOURS
Refills: 0 | Status: DISCONTINUED | OUTPATIENT
Start: 2019-10-23 | End: 2019-11-04

## 2019-10-23 RX ORDER — DEXTROSE 50 % IN WATER 50 %
15 SYRINGE (ML) INTRAVENOUS ONCE
Refills: 0 | Status: DISCONTINUED | OUTPATIENT
Start: 2019-10-23 | End: 2019-10-31

## 2019-10-23 RX ORDER — INSULIN LISPRO 100/ML
VIAL (ML) SUBCUTANEOUS
Refills: 0 | Status: DISCONTINUED | OUTPATIENT
Start: 2019-10-23 | End: 2019-10-31

## 2019-10-23 RX ORDER — GLUCAGON INJECTION, SOLUTION 0.5 MG/.1ML
1 INJECTION, SOLUTION SUBCUTANEOUS ONCE
Refills: 0 | Status: DISCONTINUED | OUTPATIENT
Start: 2019-10-23 | End: 2019-10-31

## 2019-10-23 RX ORDER — DEXTROSE 50 % IN WATER 50 %
12.5 SYRINGE (ML) INTRAVENOUS ONCE
Refills: 0 | Status: DISCONTINUED | OUTPATIENT
Start: 2019-10-23 | End: 2019-10-31

## 2019-10-23 RX ORDER — SODIUM CHLORIDE 9 MG/ML
1000 INJECTION INTRAMUSCULAR; INTRAVENOUS; SUBCUTANEOUS
Refills: 0 | Status: DISCONTINUED | OUTPATIENT
Start: 2019-10-23 | End: 2019-10-25

## 2019-10-23 RX ORDER — SODIUM CHLORIDE 9 MG/ML
1000 INJECTION, SOLUTION INTRAVENOUS
Refills: 0 | Status: DISCONTINUED | OUTPATIENT
Start: 2019-10-23 | End: 2019-10-31

## 2019-10-23 NOTE — H&P ADULT - ASSESSMENT
Pt is 48 yo female, Right handed,   s/p Right craniotomy Stereotactic 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 Garcia 2/2019, s/p RTT/Chemo in [place] 5/2019, presents to ED [facility] with c/o wound drainage/dehiscence since 10/19/2019

## 2019-10-23 NOTE — H&P ADULT - HISTORY OF PRESENT ILLNESS
Pt is 48yo female, R handed, known to Neurosurgery  s/p Right craniotomy Stereotactic 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 Garcia 2/2019, s/p RTT/Chemo in [place] 5/2019, presents to ED [facility] with c/o wound drainage since 10/19/2019.  Pt denies sob, cp, chills/fevers, photophobia, neck pain, acute visual changes, acute numbness or weakness.

## 2019-10-23 NOTE — ED PROVIDER NOTE - CLINICAL SUMMARY MEDICAL DECISION MAKING FREE TEXT BOX
Pt presents for eval of wound. Wound dehiscence at site of prior crani / excision and biopsy. Minimal drainage at this time. Afebrile. Consult NRS for eval and further recommendations. Dispo pending NRS recommendations

## 2019-10-23 NOTE — ED PROVIDER NOTE - PROGRESS NOTE DETAILS
Pt seen by NRS, d/w Dr Garcia - pt for admission to [facility], pre-op labs, pancx; admit to tele for revision. No abx at this time Pt seen by NRS, wound probed, to bone. d/w Dr Garcia - pt for admission to NRS, pre-op labs, pancx; admit to tele for revision. No abx at this time

## 2019-10-23 NOTE — ED PROVIDER NOTE - OBJECTIVE STATEMENT
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 Garcia 2/2019, s/p RTT/Chemo in [place] 5/2019, now being monitored at [facility], p/w wound drainage since 10/19/2019.  Pt reports day of onset of sx, she felt pain and something firm in the area, "like a boil." Wound began draining spontaneously. Pt called MD's at [facility] and was advised to come to the ED. Pt denies sob, cp, chills/fevers, photophobia, neck pain, acute visual changes, acute numbness or weakness.

## 2019-10-23 NOTE — H&P ADULT - PROBLEM SELECTOR PLAN 1
tele bed, Pan Culture, Wound Swab superficial culture, Sed Rate/CRP, NPO for possible wound revision in AM, IVF, pre-op labs, CXR

## 2019-10-23 NOTE — H&P ADULT - NSHPPHYSICALEXAM_GEN_ALL_CORE
AA&OX3, NAD, clear coherent speech  CNs II-XII grossly intact  motor 5/5 x 4 extr, no drift, no dysmetria,  sensation to LT grossly intact throughout  Card: S1S2 NSR  Pulm: CTA b/l  Abd: Soft, nontender, nondistended  Head: two spots where incision is dehisced , no active drainage, no obvious signs of gross infection

## 2019-10-23 NOTE — ED ADULT NURSE NOTE - OBJECTIVE STATEMENT
PT came to ED complaining of open wound with serosanguinous drainage on forehead x 4 days. 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 Pt denies fever , no headache .  Denies all other medical complaints at this time. Wound is approx 2cm x 1cm.

## 2019-10-23 NOTE — ED ADULT NURSE NOTE - CHPI ED NUR SYMPTOMS NEG
no purulent drainage/no redness/no rectal pain/no bleeding at site/no pain/no fever/no bleeding/no inflammation

## 2019-10-23 NOTE — ED PROVIDER NOTE - PHYSICAL EXAMINATION
Constitutional: Well appearing, well nourished, awake, alert, oriented to person, place, time/situation and in no apparent distress.  ENMT: Airway patent. Normal MM  Eyes: Clear bilaterally, PERRL, EOMI  Cardiac: Normal rate, regular rhythm.  Heart sounds S1, S2.  No murmurs, rubs or gallops.  Respiratory: Breaths sounds equal and clear b/l. No increased WOB, tachypnea, hypoxia, or accessory mm use. Pt speaks in full sentences.   Musculoskeletal: Range of motion is not limited. Neck supple  Neuro: Alert and oriented x 3, face symmetric and speech fluent. Strength 5/5 x 4 ext and symmetric, nml gross motor movement, nml gait. No focal deficits noted.  Skin: Skin normal color for race, warm, dry. 0.5-1 cm wound dehischence along frontal former incision w/ minimal tan-colored drainage. minimally tender. no clear abscess. + mild induration. no fluctuance  Psych: Alert and oriented to person, place, time/situation. normal mood and affect. no apparent risk to self or others.

## 2019-10-24 DIAGNOSIS — Z01.818 ENCOUNTER FOR OTHER PREPROCEDURAL EXAMINATION: ICD-10-CM

## 2019-10-24 DIAGNOSIS — G93.89 OTHER SPECIFIED DISORDERS OF BRAIN: ICD-10-CM

## 2019-10-24 LAB
APPEARANCE UR: ABNORMAL
BILIRUB UR-MCNC: NEGATIVE — SIGNIFICANT CHANGE UP
BLD GP AB SCN SERPL QL: NEGATIVE — SIGNIFICANT CHANGE UP
BLD GP AB SCN SERPL QL: NEGATIVE — SIGNIFICANT CHANGE UP
COLOR SPEC: YELLOW — SIGNIFICANT CHANGE UP
CRP SERPL-MCNC: 0.8 MG/DL — HIGH (ref 0–0.4)
DIFF PNL FLD: NEGATIVE — SIGNIFICANT CHANGE UP
ERYTHROCYTE [SEDIMENTATION RATE] IN BLOOD: 34 MM/HR — HIGH
GLUCOSE BLDC GLUCOMTR-MCNC: 106 MG/DL — HIGH (ref 70–99)
GLUCOSE BLDC GLUCOMTR-MCNC: 112 MG/DL — HIGH (ref 70–99)
GLUCOSE BLDC GLUCOMTR-MCNC: 87 MG/DL — SIGNIFICANT CHANGE UP (ref 70–99)
GLUCOSE BLDC GLUCOMTR-MCNC: 94 MG/DL — SIGNIFICANT CHANGE UP (ref 70–99)
GLUCOSE UR QL: NEGATIVE — SIGNIFICANT CHANGE UP
GRAM STN FLD: SIGNIFICANT CHANGE UP
HBA1C BLD-MCNC: 4.4 % — SIGNIFICANT CHANGE UP (ref 4–5.6)
HCG UR QL: NEGATIVE — SIGNIFICANT CHANGE UP
KETONES UR-MCNC: ABNORMAL MG/DL
LEUKOCYTE ESTERASE UR-ACNC: NEGATIVE — SIGNIFICANT CHANGE UP
NITRITE UR-MCNC: NEGATIVE — SIGNIFICANT CHANGE UP
PH UR: 7 — SIGNIFICANT CHANGE UP (ref 5–8)
PROT UR-MCNC: NEGATIVE MG/DL — SIGNIFICANT CHANGE UP
RH IG SCN BLD-IMP: POSITIVE — SIGNIFICANT CHANGE UP
RH IG SCN BLD-IMP: POSITIVE — SIGNIFICANT CHANGE UP
SP GR SPEC: 1.01 — SIGNIFICANT CHANGE UP (ref 1–1.03)
SPECIMEN SOURCE: SIGNIFICANT CHANGE UP
UROBILINOGEN FLD QL: 0.2 E.U./DL — SIGNIFICANT CHANGE UP

## 2019-10-24 PROCEDURE — 70460 CT HEAD/BRAIN W/DYE: CPT | Mod: 26

## 2019-10-24 PROCEDURE — 99223 1ST HOSP IP/OBS HIGH 75: CPT | Mod: GC

## 2019-10-24 PROCEDURE — 99231 SBSQ HOSP IP/OBS SF/LOW 25: CPT

## 2019-10-24 PROCEDURE — 93970 EXTREMITY STUDY: CPT | Mod: 26

## 2019-10-24 RX ORDER — SENNA PLUS 8.6 MG/1
1 TABLET ORAL AT BEDTIME
Refills: 0 | Status: DISCONTINUED | OUTPATIENT
Start: 2019-10-24 | End: 2019-11-04

## 2019-10-24 RX ADMIN — SENNA PLUS 1 TABLET(S): 8.6 TABLET ORAL at 22:54

## 2019-10-24 RX ADMIN — SODIUM CHLORIDE 70 MILLILITER(S): 9 INJECTION INTRAMUSCULAR; INTRAVENOUS; SUBCUTANEOUS at 01:21

## 2019-10-24 NOTE — PROGRESS NOTE ADULT - SUBJECTIVE AND OBJECTIVE BOX
Surgery:   Scalp Wound washout and repair, possible cranioplasty  Consent: Signed by patient vs HCP      Yolanda Montes                   NAME/NUMBER of HCP:     penicillins (Unknown)  sulfa drugs (Unknown)    T(C): 36.9 (10-24-19 @ 17:07), Max: 36.9 (10-24-19 @ 05:34)  HR: 74 (10-24-19 @ 12:40) (64 - 86)  BP: 99/57 (10-24-19 @ 12:40) (98/52 - 116/76)  RR: 16 (10-24-19 @ 12:00) (16 - 18)  SpO2: 100% (10-24-19 @ 12:40) (98% - 100%)  Wt(kg): --    EXAM:  Neurological: AAOx3, FC, speech coherent  CNII-XII: EOM intact, PERRL, face symmetric   Motor: MAEx4 5/5 UE and LE B/L  SILT throughout  Head: two spots where incision is dehisced, no active drainage, no obvious signs of gross infection    10-23    141  |  104  |  9   ----------------------------<  125<H>  4.4   |  24  |  0.52    Ca    10.1      23 Oct 2019 21:49      CBC Full  -  ( 23 Oct 2019 21:49 )  WBC Count : 2.75 K/uL  RBC Count : 2.97 M/uL  Hemoglobin : 10.8 g/dL  Hematocrit : 31.9 %  Platelet Count - Automated : 49 K/uL  Mean Cell Volume : 107.4 fl  Mean Cell Hemoglobin : 36.4 pg  Mean Cell Hemoglobin Concentration : 33.9 gm/dL  Auto Neutrophil # : 1.91 K/uL  Auto Lymphocyte # : 0.58 K/uL  Auto Monocyte # : 0.19 K/uL  Auto Eosinophil # : 0.02 K/uL  Auto Basophil # : 0.02 K/uL  Auto Neutrophil % : 67.5 %  Auto Lymphocyte % : 21.0 %  Auto Monocyte % : 7.0 %  Auto Eosinophil % : 0.9 %  Auto Basophil % : 0.9 %    PT/INR - ( 23 Oct 2019 21:49 )   PT: 13.5 sec;   INR: 1.19       Pregnancy test: Negative 10/23/19  Type & Screen (in past 72hrs): 10/24/19    CXR: 10/23  EKG: 10/23 NSR  Medical Clearances: N/A  Other Clearances: N/A    Last dose of antiplatelet/anticoagulation drug: None    Implanted Devices (pacemaker, drug pump...etc):  []YES   [x] NO                  If yes --> EPS consulted to interrogate/adjust device:    MRSA swab(exclusion - cerebral angiograms and microdiscectomy) :  pending               Assessment:  50 yo female, Right handed, s/p Right craniotomy Stereotactic 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 Garcia 2/2019, s/p RTT/Chemo in [place] 5/2019, presents to ED [facility] with c/o wound drainage/dehiscence since 10/19/2019    Plan:  -OR tomorrow for scalp wound washout and repair, possible cranioplasty  -Type and screen  -PRBC on hold for OR  -NPO after midnight, IVF hydration while NPO  -MRSA pending  -F/u AM coags  -Pregnancy negative  -Consent in chart  -D/w Dr. Garcia

## 2019-10-24 NOTE — CONSULT NOTE ADULT - PROBLEM SELECTOR RECOMMENDATION 3
The patient's medical condition is optimized for surgery.  There is no contraindication for surgery.  There is no clinical evidence neither of angina, decompensated CHF, arrhthymias, nor valvular disease.   There is no limitation of exercise capacity.  MET is 6 .  ASA class is 2.  Blue cardiac risk factor is low .  DVT prophylaxis is indicated.  Pain control.  Early mobilization.  Avoid fluid overload.

## 2019-10-24 NOTE — PROGRESS NOTE ADULT - SUBJECTIVE AND OBJECTIVE BOX
HPI:  Pt is 50yo female, R handed, known to Neurosurgery  s/p Right craniotomy Stereotactic 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 Garcia 2019, s/p RTT/Chemo in [place] 2019, presents to ED [facility] with c/o wound drainage since 10/19/2019.  Pt denies sob, cp, chills/fevers, photophobia, neck pain, acute visual changes, acute numbness or weakness. (23 Oct 2019 23:36)    10/23: presents to ED [facility] with c/o wound drainage since 10/19/2019.   10/24: WILMA overnight.     Vital Signs Last 24 Hrs  T(C): 36.9 (24 Oct 2019 05:34), Max: 36.9 (24 Oct 2019 05:34)  T(F): 98.5 (24 Oct 2019 05:34), Max: 98.5 (24 Oct 2019 05:34)  HR: 64 (24 Oct 2019 03:40) (64 - 86)  BP: 99/68 (24 Oct 2019 03:40) (99/68 - 116/76)  BP(mean): 77 (24 Oct 2019 03:40) (76 - 77)  RR: 16 (24 Oct 2019 03:40) (16 - 18)  SpO2: 100% (24 Oct 2019 03:40) (98% - 100%)    I&O's Summary    23 Oct 2019 07:01  -  24 Oct 2019 06:23  --------------------------------------------------------  IN: 140 mL / OUT: 0 mL / NET: 140 mL        PHYSICAL EXAM:  Neurological: AA&OX3, NAD, clear coherent speech  CNs II-XII grossly intact  motor 5/5 x 4 extr, no drift, no dysmetria,  sensation to LT grossly intact throughout  Card: S1S2 NSR  Pulm: CTA b/l  Abd: Soft, nontender, nondistended  Head: two spots where incision is dehisced, no active drainage, no obvious signs of gross infection    LABS:                        10.8   2.75  )-----------( 49       ( 23 Oct 2019 21:49 )             31.9     10-23    141  |  104  |  9   ----------------------------<  125<H>  4.4   |  24  |  0.52    Ca    10.1      23 Oct 2019 21:49      PT/INR - ( 23 Oct 2019 21:49 )   PT: 13.5 sec;   INR: 1.19            Urinalysis Basic - ( 23 Oct 2019 23:49 )    Color: Yellow / Appearance: SL Cloudy / S.015 / pH: x  Gluc: x / Ketone: Trace mg/dL  / Bili: Negative / Urobili: 0.2 E.U./dL   Blood: x / Protein: NEGATIVE mg/dL / Nitrite: NEGATIVE   Leuk Esterase: NEGATIVE / RBC: x / WBC x   Sq Epi: x / Non Sq Epi: x / Bacteria: x          CAPILLARY BLOOD GLUCOSE          Drug Levels: [] N/A    CSF Analysis: [] N/A      Allergies    penicillins (Unknown)  sulfa drugs (Unknown)    Intolerances      MEDICATIONS:  Antibiotics:    Neuro:  ondansetron Injectable 4 milliGRAM(s) IV Push every 6 hours PRN    Anticoagulation:    OTHER:  dextrose 40% Gel 15 Gram(s) Oral once PRN  dextrose 50% Injectable 12.5 Gram(s) IV Push once  glucagon  Injectable 1 milliGRAM(s) IntraMuscular once PRN  insulin lispro (HumaLOG) corrective regimen sliding scale   SubCutaneous Before meals and at bedtime    IVF:  dextrose 5%. 1000 milliLiter(s) IV Continuous <Continuous>  sodium chloride 0.9%. 1000 milliLiter(s) IV Continuous <Continuous>    CULTURES:    RADIOLOGY & ADDITIONAL TESTS:      ASSESSMENT:  48 yo female, Right handed, s/p Right craniotomy Stereotactic 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 Garcia 2019, s/p RTT/Chemo in [place] 2019, presents to ED [facility] with c/o wound drainage/dehiscence since 10/19/2019    WOUND DEHISCENCE  No pertinent family history in first degree relatives  Handoff  MEWS Score  Brain mass  Fibroids  No pertinent past medical history  Wound dehiscence  Suspected deep vein thrombosis (DVT)  Wound dehiscence  H/O hysterectomy with unilateral oophorectomy  No significant past surgical history  No significant past surgical history  WOUND CHECK  90+      PLAN:  -neuro checks  -f/u wound culture  -possible OR today for wound revision  -pain control  -DVT prophylaxis: SCDs  -bowel regimen  -dispo pending  -D/w Dr. Garcia

## 2019-10-25 DIAGNOSIS — D61.818 OTHER PANCYTOPENIA: ICD-10-CM

## 2019-10-25 LAB
ANION GAP SERPL CALC-SCNC: 8 MMOL/L — SIGNIFICANT CHANGE UP (ref 5–17)
APTT BLD: 27.3 SEC — LOW (ref 27.5–36.3)
BUN SERPL-MCNC: 9 MG/DL — SIGNIFICANT CHANGE UP (ref 7–23)
CALCIUM SERPL-MCNC: 9.4 MG/DL — SIGNIFICANT CHANGE UP (ref 8.4–10.5)
CHLORIDE SERPL-SCNC: 107 MMOL/L — SIGNIFICANT CHANGE UP (ref 96–108)
CO2 SERPL-SCNC: 25 MMOL/L — SIGNIFICANT CHANGE UP (ref 22–31)
CREAT SERPL-MCNC: 0.66 MG/DL — SIGNIFICANT CHANGE UP (ref 0.5–1.3)
CULTURE RESULTS: SIGNIFICANT CHANGE UP
GLUCOSE BLDC GLUCOMTR-MCNC: 112 MG/DL — HIGH (ref 70–99)
GLUCOSE BLDC GLUCOMTR-MCNC: 119 MG/DL — HIGH (ref 70–99)
GLUCOSE BLDC GLUCOMTR-MCNC: 90 MG/DL — SIGNIFICANT CHANGE UP (ref 70–99)
GLUCOSE BLDC GLUCOMTR-MCNC: 97 MG/DL — SIGNIFICANT CHANGE UP (ref 70–99)
GLUCOSE SERPL-MCNC: 93 MG/DL — SIGNIFICANT CHANGE UP (ref 70–99)
HCT VFR BLD CALC: 28.5 % — LOW (ref 34.5–45)
HGB BLD-MCNC: 9.5 G/DL — LOW (ref 11.5–15.5)
INR BLD: 1.19 — HIGH (ref 0.88–1.16)
MAGNESIUM SERPL-MCNC: 1.9 MG/DL — SIGNIFICANT CHANGE UP (ref 1.6–2.6)
MCHC RBC-ENTMCNC: 33.3 GM/DL — SIGNIFICANT CHANGE UP (ref 32–36)
MCHC RBC-ENTMCNC: 35.7 PG — HIGH (ref 27–34)
MCV RBC AUTO: 107.1 FL — HIGH (ref 80–100)
NRBC # BLD: 0 /100 WBCS — SIGNIFICANT CHANGE UP (ref 0–0)
PHOSPHATE SERPL-MCNC: 4.7 MG/DL — HIGH (ref 2.5–4.5)
PLATELET # BLD AUTO: 40 K/UL — LOW (ref 150–400)
POTASSIUM SERPL-MCNC: 3.9 MMOL/L — SIGNIFICANT CHANGE UP (ref 3.5–5.3)
POTASSIUM SERPL-SCNC: 3.9 MMOL/L — SIGNIFICANT CHANGE UP (ref 3.5–5.3)
PROTHROM AB SERPL-ACNC: 13.5 SEC — HIGH (ref 10–12.9)
RBC # BLD: 2.66 M/UL — LOW (ref 3.8–5.2)
RBC # FLD: 12.6 % — SIGNIFICANT CHANGE UP (ref 10.3–14.5)
SODIUM SERPL-SCNC: 140 MMOL/L — SIGNIFICANT CHANGE UP (ref 135–145)
SPECIMEN SOURCE: SIGNIFICANT CHANGE UP
WBC # BLD: 1.56 K/UL — LOW (ref 3.8–10.5)
WBC # FLD AUTO: 1.56 K/UL — LOW (ref 3.8–10.5)

## 2019-10-25 PROCEDURE — 99222 1ST HOSP IP/OBS MODERATE 55: CPT | Mod: GC

## 2019-10-25 PROCEDURE — 99223 1ST HOSP IP/OBS HIGH 75: CPT

## 2019-10-25 PROCEDURE — 99232 SBSQ HOSP IP/OBS MODERATE 35: CPT | Mod: GC

## 2019-10-25 PROCEDURE — 99232 SBSQ HOSP IP/OBS MODERATE 35: CPT

## 2019-10-25 RX ORDER — ELTROMBOPAG OLAMINE 50 MG/1
50 TABLET, FILM COATED ORAL DAILY
Refills: 0 | Status: COMPLETED | OUTPATIENT
Start: 2019-10-26 | End: 2019-10-30

## 2019-10-25 RX ORDER — FILGRASTIM 480MCG/1.6
300 VIAL (ML) INJECTION DAILY
Refills: 0 | Status: DISCONTINUED | OUTPATIENT
Start: 2019-10-25 | End: 2019-10-26

## 2019-10-25 RX ADMIN — Medication 300 MICROGRAM(S): at 21:10

## 2019-10-25 RX ADMIN — SENNA PLUS 1 TABLET(S): 8.6 TABLET ORAL at 21:11

## 2019-10-25 NOTE — PROGRESS NOTE ADULT - SUBJECTIVE AND OBJECTIVE BOX
50yo female, R handed, known to Neurosurgery  s/p Right craniotomy Stereotactic 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 Garcia 2/2019, s/p RTT/Chemo in [place] 5/2019, presents to ED [facility] with c/o wound drainage since 10/19/2019.  Pt denies sob, cp, chills/fevers, photophobia, neck pain, acute visual changes, acute numbness or weakness. (23 Oct 2019 23:36)    10/23: presents to ED [facility] with c/o wound drainage since 10/19/2019.   10/24: WILMA overnight.   10/25: WILMA ovenright, pre op for washout revision this am    T(C): 36.7 (10-24-19 @ 22:37), Max: 36.9 (10-24-19 @ 05:34)  HR: 68 (10-25-19 @ 00:12) (64 - 78)  BP: 105/67 (10-25-19 @ 00:12) (94/54 - 105/67)  RR: 16 (10-24-19 @ 20:14) (16 - 16)  SpO2: 99% (10-25-19 @ 00:12) (99% - 100%)  Wt(kg): --    Exam:  Neurological: AA&OX3, NAD, clear coherent speech  CNs II-XII grossly intact  motor 5/5 x 4 extr, no drift, no dysmetria,  sensation to LT grossly intact throughout  Card: S1S2 NSR  Pulm: CTA b/l  Abd: Soft, nontender, nondistended  Head: two spots where incision is dehisced, no active drainage, no obvious signs of gross infection    CBC Full  -  ( 23 Oct 2019 21:49 )  WBC Count : 2.75 K/uL  RBC Count : 2.97 M/uL  Hemoglobin : 10.8 g/dL  Hematocrit : 31.9 %  Platelet Count - Automated : 49 K/uL  Mean Cell Volume : 107.4 fl  Mean Cell Hemoglobin : 36.4 pg  Mean Cell Hemoglobin Concentration : 33.9 gm/dL  Auto Neutrophil # : 1.91 K/uL  Auto Lymphocyte # : 0.58 K/uL  Auto Monocyte # : 0.19 K/uL  Auto Eosinophil # : 0.02 K/uL  Auto Basophil # : 0.02 K/uL  Auto Neutrophil % : 67.5 %  Auto Lymphocyte % : 21.0 %  Auto Monocyte % : 7.0 %  Auto Eosinophil % : 0.9 %  Auto Basophil % : 0.9 %    10-23    141  |  104  |  9   ----------------------------<  125<H>  4.4   |  24  |  0.52    Ca    10.1      23 Oct 2019 21:49      PT/INR - ( 23 Oct 2019 21:49 )   PT: 13.5 sec;   INR: 1.19       Assessment/Plan:  50 yo female, Right handed, s/p Right craniotomy Stereotactic 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 Garcia 2/2019, s/p RTT/Chemo in [place] 5/2019, presents to ED [facility] with c/o wound drainage/dehiscence since 10/19/2019    WOUND DEHISCENCE  No pertinent family history in first degree relatives  Handoff  MEWS Score  Brain mass  Fibroids  No pertinent past medical history  Wound dehiscence  Suspected deep vein thrombosis (DVT)  Wound dehiscence  H/O hysterectomy with unilateral oophorectomy  No significant past surgical history  No significant past surgical history  WOUND CHECK    PLAN:  -neuro checks  -f/u wound culture  -OR today for wound revision  -pain control  -DVT prophylaxis: SCDs  -bowel regimen  -dispo pending  -D/w Dr. Garcia 48yo female, R handed, known to Neurosurgery  s/p Right craniotomy Stereotactic 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 Garcia 2/2019, s/p RTT/Chemo in [place] 5/2019, presents to ED [facility] with c/o wound drainage since 10/19/2019.  Pt denies sob, cp, chills/fevers, photophobia, neck pain, acute visual changes, acute numbness or weakness. (23 Oct 2019 23:36)    10/23: presents to ED [facility] with c/o wound drainage since 10/19/2019.   10/24: WILMA overnight.   10/25: WILMA ovenright, pre op for washout revision this am    T(C): 36.7 (10-24-19 @ 22:37), Max: 36.9 (10-24-19 @ 05:34)  HR: 68 (10-25-19 @ 00:12) (64 - 78)  BP: 105/67 (10-25-19 @ 00:12) (94/54 - 105/67)  RR: 16 (10-24-19 @ 20:14) (16 - 16)  SpO2: 99% (10-25-19 @ 00:12) (99% - 100%)  Wt(kg): --    Exam:  Neurological: AA&OX3, NAD, clear coherent speech  CNs II-XII grossly intact  motor 5/5 x 4 extr, no drift, no dysmetria,  sensation to LT grossly intact throughout  Card: S1S2 NSR  Pulm: CTA b/l  Abd: Soft, nontender, nondistended  Head: two spots where incision is dehisced, no active drainage, no obvious signs of gross infection    CBC Full  -  ( 23 Oct 2019 21:49 )  WBC Count : 2.75 K/uL  RBC Count : 2.97 M/uL  Hemoglobin : 10.8 g/dL  Hematocrit : 31.9 %  Platelet Count - Automated : 49 K/uL  Mean Cell Volume : 107.4 fl  Mean Cell Hemoglobin : 36.4 pg  Mean Cell Hemoglobin Concentration : 33.9 gm/dL  Auto Neutrophil # : 1.91 K/uL  Auto Lymphocyte # : 0.58 K/uL  Auto Monocyte # : 0.19 K/uL  Auto Eosinophil # : 0.02 K/uL  Auto Basophil # : 0.02 K/uL  Auto Neutrophil % : 67.5 %  Auto Lymphocyte % : 21.0 %  Auto Monocyte % : 7.0 %  Auto Eosinophil % : 0.9 %  Auto Basophil % : 0.9 %    10-23    141  |  104  |  9   ----------------------------<  125<H>  4.4   |  24  |  0.52    Ca    10.1      23 Oct 2019 21:49      PT/INR - ( 23 Oct 2019 21:49 )   PT: 13.5 sec;   INR: 1.19       Assessment/Plan:  50 yo female, Right handed, s/p Right craniotomy Stereotactic 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 Garcia 2/2019, s/p RTT/Chemo in [place] 5/2019, presents to ED [facility] with c/o wound drainage/dehiscence since 10/19/2019    WOUND DEHISCENCE  No pertinent family history in first degree relatives  Handoff  MEWS Score  Brain mass  Fibroids  No pertinent past medical history  Wound dehiscence  Suspected deep vein thrombosis (DVT)  Wound dehiscence  H/O hysterectomy with unilateral oophorectomy  No significant past surgical history  No significant past surgical history  WOUND CHECK    PLAN:  -neuro checks  -f/u wound culture  -OR today for wound revision  -pain control  -DVT prophylaxis: SCDs  -ID consulted  -bowel regimen  -dispo pending  -D/w Dr. Garcia

## 2019-10-25 NOTE — CONSULT NOTE ADULT - ASSESSMENT
IMPRESSION:  Frontal scalp wound, possible infection.  On my exam there is no purulence or tenderness.  She has no fevers or systemic signs of illness      Recommend:  1.  Can hold antibiotics for now  2.  Follow up OR cultures and adjust antibiotics accordingly      ID team 1 will follow
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 KELLEY (long name...) had BM aspirate and bx, non diagnostic.    pt neurologist in [place]  Dr. Lior Pressley  , I left massage with his office

## 2019-10-25 NOTE — PROGRESS NOTE ADULT - SUBJECTIVE AND OBJECTIVE BOX
Interval Events: Reviewed  Patient seen and examined at bedside.    Patient is a 49y old  Female who presents with a chief complaint of Wound drainage/dehiscence (25 Oct 2019 17:01)  she is doign OK and was told surgery is on hold    PAST MEDICAL & SURGICAL HISTORY:  Brain mass  Fibroids  H/O hysterectomy with unilateral oophorectomy      MEDICATIONS:  Pulmonary:    Antimicrobials:    Anticoagulants:    Cardiac:      Allergies    penicillins (Unknown)  sulfa drugs (Unknown)    Intolerances        Vital Signs Last 24 Hrs  T(C): 36.2 (25 Oct 2019 17:47), Max: 36.9 (25 Oct 2019 09:00)  T(F): 97.2 (25 Oct 2019 17:47), Max: 98.4 (25 Oct 2019 09:00)  HR: 92 (25 Oct 2019 15:55) (62 - 92)  BP: 98/61 (25 Oct 2019 15:55) (94/53 - 105/67)  BP(mean): 75 (25 Oct 2019 15:55) (68 - 81)  RR: 19 (25 Oct 2019 15:55) (16 - 20)  SpO2: 100% (25 Oct 2019 15:55) (99% - 100%)    10-24 @ :  -  10-25 @ 07:00  --------------------------------------------------------  IN: 2570 mL / OUT: 2500 mL / NET: 70 mL    10-25 @ 07:01  -  10-25 @ 18:40  --------------------------------------------------------  IN: 657 mL / OUT: 1000 mL / NET: -343 mL          Review of Systems:   •	General: negative  •	Skin/Breast: negative  •	Ophthalmologic: negative  •	ENMT: negative  •	Respiratory and Thorax: negative  •	Cardiovascular: negative  •	Gastrointestinal: negative  •	Genitourinary: negative  •	Musculoskeletal: negative  •	Neurological: negative  •	Psychiatric: negative  •	Hematology/Lymphatics: negative  •	Endocrine: negative  •	Allergic/Immunologic: negative    Physical Exam:   • Constitutional:	Well-developed, well nourished  • Eyes:	EOMI; PERRL; no drainage or redness  • ENMT:	No oral lesions; no gross abnormalities  • Neck	No bruits; no thyromegaly or nodules  • Breasts:	not examined  • Back:	No deformity or limitation of movement  • Respiratory:	Breath Sounds equal & clear to percussion & auscultation, no accessory muscle use  • Cardiovascular:	Regular rate & rhythm, normal S1, S2; no murmurs, gallops or rubs; no S3, S4  • Gastrointestinal:	Soft, non-tender, no hepatosplenomegaly, normal bowel sounds  • Genitourinary:	not examined  • Rectal: not examined  • Extremities:	No cyanosis, clubbing or edema  • Vascular:	Equal and normal pulses (carotid, femoral, dorsalis pedis)  • Neurologica:l	not examined  • Skin:	No lesions; no rash  • Lymph Nodes:	No lymphadedenopathy  • Musculoskeletal:	No joint pain, swelling or deformity; no limitation of movement        LABS:      CBC Full  -  ( 25 Oct 2019 05:51 )  WBC Count : 1.56 K/uL  RBC Count : 2.66 M/uL  Hemoglobin : 9.5 g/dL  Hematocrit : 28.5 %  Platelet Count - Automated : 40 K/uL  Mean Cell Volume : 107.1 fl  Mean Cell Hemoglobin : 35.7 pg  Mean Cell Hemoglobin Concentration : 33.3 gm/dL  Auto Neutrophil # : x  Auto Lymphocyte # : x  Auto Monocyte # : x  Auto Eosinophil # : x  Auto Basophil # : x  Auto Neutrophil % : x  Auto Lymphocyte % : x  Auto Monocyte % : x  Auto Eosinophil % : x  Auto Basophil % : x    10-25    140  |  107  |  9   ----------------------------<  93  3.9   |  25  |  0.66    Ca    9.4      25 Oct 2019 05:51  Phos  4.7     10-25  Mg     1.9     10-25      PT/INR - ( 25 Oct 2019 06:19 )   PT: 13.5 sec;   INR: 1.19          PTT - ( 25 Oct 2019 06:19 )  PTT:27.3 sec      Urinalysis Basic - ( 23 Oct 2019 23:49 )    Color: Yellow / Appearance: SL Cloudy / S.015 / pH: x  Gluc: x / Ketone: Trace mg/dL  / Bili: Negative / Urobili: 0.2 E.U./dL   Blood: x / Protein: NEGATIVE mg/dL / Nitrite: NEGATIVE   Leuk Esterase: NEGATIVE / RBC: x / WBC x   Sq Epi: x / Non Sq Epi: x / Bacteria: x              Culture Results:   Rare Staphylococcus capitis (10-24 @ 08:34)  Culture Results:   Specimen appears CONTAMINATED. Lab suggests repeat clean catch specimen. (10-24 @ 08:30)  Culture Results:   No growth at 1 day. (10-24 @ 04:23)  Culture Results:   No growth at 1 day. (10-24 @ 04:23)      RADIOLOGY & ADDITIONAL STUDIES (The following images were personally reviewed):  Elliott:                                     No  Urine output:                       adequate  DVT prophylaxis:                 Yes  Flattus:                                  Yes  Bowel movement:              No

## 2019-10-25 NOTE — CONSULT NOTE ADULT - PROBLEM SELECTOR RECOMMENDATION 9
Pancytopenia...please obtain TSH, Vitamin B12 , folate prior .    Will need plt transfusion prior to OR    will discuss with Dr. Garcia his plans for surgery. Pancytopenia...ANC [id number],please obtain TSH, Vitamin B12 , folate prior .    Will need plt transfusion prior to OR    will discuss with Dr. Garcia his plans for surgery.

## 2019-10-26 DIAGNOSIS — D61.9 APLASTIC ANEMIA, UNSPECIFIED: ICD-10-CM

## 2019-10-26 LAB
ANION GAP SERPL CALC-SCNC: 11 MMOL/L — SIGNIFICANT CHANGE UP (ref 5–17)
BUN SERPL-MCNC: 12 MG/DL — SIGNIFICANT CHANGE UP (ref 7–23)
CALCIUM SERPL-MCNC: 9.8 MG/DL — SIGNIFICANT CHANGE UP (ref 8.4–10.5)
CHLORIDE SERPL-SCNC: 107 MMOL/L — SIGNIFICANT CHANGE UP (ref 96–108)
CO2 SERPL-SCNC: 24 MMOL/L — SIGNIFICANT CHANGE UP (ref 22–31)
CREAT SERPL-MCNC: 0.68 MG/DL — SIGNIFICANT CHANGE UP (ref 0.5–1.3)
FOLATE SERPL-MCNC: 8.5 NG/ML — SIGNIFICANT CHANGE UP
GLUCOSE BLDC GLUCOMTR-MCNC: 78 MG/DL — SIGNIFICANT CHANGE UP (ref 70–99)
GLUCOSE BLDC GLUCOMTR-MCNC: 84 MG/DL — SIGNIFICANT CHANGE UP (ref 70–99)
GLUCOSE BLDC GLUCOMTR-MCNC: 87 MG/DL — SIGNIFICANT CHANGE UP (ref 70–99)
GLUCOSE BLDC GLUCOMTR-MCNC: 96 MG/DL — SIGNIFICANT CHANGE UP (ref 70–99)
GLUCOSE SERPL-MCNC: 87 MG/DL — SIGNIFICANT CHANGE UP (ref 70–99)
HCT VFR BLD CALC: 29 % — LOW (ref 34.5–45)
HGB BLD-MCNC: 9.7 G/DL — LOW (ref 11.5–15.5)
MAGNESIUM SERPL-MCNC: 1.8 MG/DL — SIGNIFICANT CHANGE UP (ref 1.6–2.6)
MCHC RBC-ENTMCNC: 33.4 GM/DL — SIGNIFICANT CHANGE UP (ref 32–36)
MCHC RBC-ENTMCNC: 35.5 PG — HIGH (ref 27–34)
MCV RBC AUTO: 106.2 FL — HIGH (ref 80–100)
NRBC # BLD: 0 /100 WBCS — SIGNIFICANT CHANGE UP (ref 0–0)
PHOSPHATE SERPL-MCNC: 4.4 MG/DL — SIGNIFICANT CHANGE UP (ref 2.5–4.5)
PLATELET # BLD AUTO: 42 K/UL — LOW (ref 150–400)
POTASSIUM SERPL-MCNC: 3.9 MMOL/L — SIGNIFICANT CHANGE UP (ref 3.5–5.3)
POTASSIUM SERPL-SCNC: 3.9 MMOL/L — SIGNIFICANT CHANGE UP (ref 3.5–5.3)
RBC # BLD: 2.73 M/UL — LOW (ref 3.8–5.2)
RBC # FLD: 12.5 % — SIGNIFICANT CHANGE UP (ref 10.3–14.5)
SODIUM SERPL-SCNC: 142 MMOL/L — SIGNIFICANT CHANGE UP (ref 135–145)
TSH SERPL-MCNC: 1.43 UIU/ML — SIGNIFICANT CHANGE UP (ref 0.35–4.94)
VIT B12 SERPL-MCNC: 467 PG/ML — SIGNIFICANT CHANGE UP (ref 232–1245)
WBC # BLD: 5.68 K/UL — SIGNIFICANT CHANGE UP (ref 3.8–10.5)
WBC # FLD AUTO: 5.68 K/UL — SIGNIFICANT CHANGE UP (ref 3.8–10.5)

## 2019-10-26 PROCEDURE — 99232 SBSQ HOSP IP/OBS MODERATE 35: CPT

## 2019-10-26 PROCEDURE — 99231 SBSQ HOSP IP/OBS SF/LOW 25: CPT

## 2019-10-26 PROCEDURE — 73502 X-RAY EXAM HIP UNI 2-3 VIEWS: CPT | Mod: 26,LT

## 2019-10-26 RX ORDER — INFLUENZA VIRUS VACCINE 15; 15; 15; 15 UG/.5ML; UG/.5ML; UG/.5ML; UG/.5ML
0.5 SUSPENSION INTRAMUSCULAR ONCE
Refills: 0 | Status: DISCONTINUED | OUTPATIENT
Start: 2019-10-26 | End: 2019-11-04

## 2019-10-26 RX ORDER — ACETAMINOPHEN 500 MG
650 TABLET ORAL EVERY 6 HOURS
Refills: 0 | Status: DISCONTINUED | OUTPATIENT
Start: 2019-10-26 | End: 2019-10-28

## 2019-10-26 RX ORDER — FILGRASTIM 480MCG/1.6
300 VIAL (ML) INJECTION
Refills: 0 | Status: DISCONTINUED | OUTPATIENT
Start: 2019-10-27 | End: 2019-11-03

## 2019-10-26 RX ADMIN — Medication 650 MILLIGRAM(S): at 21:44

## 2019-10-26 RX ADMIN — ELTROMBOPAG OLAMINE 50 MILLIGRAM(S): 50 TABLET, FILM COATED ORAL at 03:22

## 2019-10-26 RX ADMIN — SENNA PLUS 1 TABLET(S): 8.6 TABLET ORAL at 21:44

## 2019-10-26 RX ADMIN — Medication 650 MILLIGRAM(S): at 22:44

## 2019-10-26 NOTE — PROGRESS NOTE ADULT - SUBJECTIVE AND OBJECTIVE BOX
HPI:  Pt is 50yo female, R handed, known to Neurosurgery  s/p Right craniotomy Stereotactic 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 Garcia 2/2019, s/p RTT/Chemo in [place] 5/2019, presents to ED [facility] with c/o wound drainage since 10/19/2019.  Pt denies sob, cp, chills/fevers, photophobia, neck pain, acute visual changes, acute numbness or weakness. (23 Oct 2019 23:36)      Hospital course:   10/23: presents to ED [facility] with c/o wound drainage since 10/19/2019.   10/24: WILMA overnight.   10/25: WILMA [email or web address], pre op for washout revision this am- cancelled  10/26: WILMA overnight. Cherise following for pancytopenia. ID consulted. F/u OR plan for wound revision       Vital Signs Last 24 Hrs  T(C): 36.4 (26 Oct 2019 10:24), Max: 36.9 (25 Oct 2019 20:45)  T(F): 97.6 (26 Oct 2019 10:24), Max: 98.4 (25 Oct 2019 20:45)  HR: 78 (26 Oct 2019 08:35) (60 - 92)  BP: 93/61 (26 Oct 2019 08:35) (92/51 - 107/62)  BP(mean): 72 (26 Oct 2019 08:35) (65 - 79)  RR: 15 (26 Oct 2019 08:35) (15 - 19)  SpO2: 99% (26 Oct 2019 08:35) (99% - 100%)    I&O's Summary    25 Oct 2019 07:01  -  26 Oct 2019 07:00  --------------------------------------------------------  IN: 657 mL / OUT: 2200 mL / NET: -1543 mL        PHYSICAL EXAM:  Neurological: AOx3, NAD, FC, speech coherent   CN II-XII: EOMI, PERRL, face symmetric, tongue midline   Motor: MAEx4 5/5 UE and LE B/L   SILT throughout  Head: two spots where incision is dehisced, no active drainage, no obvious signs of gross infection      TUBES/LINES:  [] Elliott  [] A-line  [] Lumbar Drain  [] Wound Drains  [] NGT   [] EVD   [] CVC  [] Other      DIET:  [] NPO  [x] Mechanical  [] Tube feeds    LABS:                        9.7    5.68  )-----------( 42       ( 26 Oct 2019 06:26 )             29.0     10-26    142  |  107  |  12  ----------------------------<  87  3.9   |  24  |  0.68    Ca    9.8      26 Oct 2019 06:26  Phos  4.4     10-26  Mg     1.8     10-26      PT/INR - ( 25 Oct 2019 06:19 )   PT: 13.5 sec;   INR: 1.19          PTT - ( 25 Oct 2019 06:19 )  PTT:27.3 sec        CAPILLARY BLOOD GLUCOSE      POCT Blood Glucose.: 96 mg/dL (26 Oct 2019 11:54)  POCT Blood Glucose.: 87 mg/dL (26 Oct 2019 06:31)  POCT Blood Glucose.: 112 mg/dL (25 Oct 2019 22:00)  POCT Blood Glucose.: 97 mg/dL (25 Oct 2019 16:21)      Drug Levels: [] N/A    CSF Analysis: [] N/A      Allergies    penicillins (Unknown)  sulfa drugs (Unknown)    Intolerances        Home Medications:      MEDICATIONS:  MEDICATIONS  (STANDING):  dextrose 5%. 1000 milliLiter(s) (50 mL/Hr) IV Continuous <Continuous>  dextrose 50% Injectable 12.5 Gram(s) IV Push once  eltrombopag 50 milliGRAM(s) Oral daily  influenza   Vaccine 0.5 milliLiter(s) IntraMuscular once  insulin lispro (HumaLOG) corrective regimen sliding scale   SubCutaneous Before meals and at bedtime  senna 1 Tablet(s) Oral at bedtime    MEDICATIONS  (PRN):  dextrose 40% Gel 15 Gram(s) Oral once PRN Blood Glucose LESS THAN 70 milliGRAM(s)/deciliter  glucagon  Injectable 1 milliGRAM(s) IntraMuscular once PRN Glucose LESS THAN 70 milligrams/deciliter  ondansetron Injectable 4 milliGRAM(s) IV Push every 6 hours PRN Nausea and/or Vomiting      CULTURES:  Culture Results:   Rare Staphylococcus capitis  Susceptibility to follow. (10-24 @ 08:34)  Culture Results:   Specimen appears CONTAMINATED. Lab suggests repeat clean catch specimen. (10-24 @ 08:30)      RADIOLOGY & ADDITIONAL TESTS:      ASSESSMENT:  50 yo female, Right handed, s/p Right craniotomy Stereotactic 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 Garcia 2/2019, s/p RTT/Chemo in [place] 5/2019, presents to ED [facility] with c/o wound drainage/dehiscence since 10/19/2019      PLAN:  -neuro/vital checks  -pain control prn  -wound care  -ID consulted- no abx for now  -Heme (Cherise) following- continue neupogen every other day x 5 days  -trend heme labs   -f/u potential OR plan next week  -d/w Dr. Garcia       Assessment: present when checked     [] GCS   E   V   M     Heart Failure: [] Acute, [] acute on chronic, [] chronic   Heart Failure: [] Diastolic (HFpEF), [] Systolic (HRrEF), [] Combined (HFpEF and HFrEF), [] RHF, [] Pulm HTN, [] Other     [] IRMA, [] ATN, [] AIN, [] other   [] CKD1, [] CKD2, [] CKD3, [] CKD4, [] CKD5, [] ESRD     Encephalopathy: [] Metabolic, [] Hepatic, [] Toxic, [] Neurological, [] Other     Abnormal Nutritional Status: [] malnutrition (see nutrition note), []underweight: BMI <19, [] morbid obesity: BMI >40, [] Cachexia     [] Sepsis   [] Hypovolemic shock, [] Cardiogenic shock, [] Hemorrhagic shock, [] Neurogenic shock   [] Acute respiratory failure   [] Cerebral edema, [] Brain compression / herniation   [] Functional quadriplegia   [] Acute blood loss anemia

## 2019-10-26 NOTE — PROGRESS NOTE ADULT - SUBJECTIVE AND OBJECTIVE BOX
HEALTH ISSUES - PROBLEM Dx:  Pancytopenia: Pancytopenia  Preoperative clearance: Preoperative clearance  Brain mass: Brain mass  Suspected deep vein thrombosis (DVT)  Wound dehiscence: Wound dehiscence          CHEMOTHERAPY REGIMEN:        Day:                          Diet:  Protocol:                                    IVF:      MEDICATIONS  (STANDING):  dextrose 5%. 1000 milliLiter(s) (50 mL/Hr) IV Continuous <Continuous>  dextrose 50% Injectable 12.5 Gram(s) IV Push once  eltrombopag 50 milliGRAM(s) Oral daily  influenza   Vaccine 0.5 milliLiter(s) IntraMuscular once  insulin lispro (HumaLOG) corrective regimen sliding scale   SubCutaneous Before meals and at bedtime  senna 1 Tablet(s) Oral at bedtime    MEDICATIONS  (PRN):  dextrose 40% Gel 15 Gram(s) Oral once PRN Blood Glucose LESS THAN 70 milliGRAM(s)/deciliter  glucagon  Injectable 1 milliGRAM(s) IntraMuscular once PRN Glucose LESS THAN 70 milligrams/deciliter  ondansetron Injectable 4 milliGRAM(s) IV Push every 6 hours PRN Nausea and/or Vomiting      Allergies    penicillins (Unknown)  sulfa drugs (Unknown)    Intolerances        DVT Prophylaxis: [ ] YES [ ] NO      Antibiotics: [ ] YES [ ] NO    Pain Scale (1-10):       Location:    Vital Signs Last 24 Hrs  T(C): 36.4 (26 Oct 2019 05:00), Max: 36.9 (25 Oct 2019 20:45)  T(F): 97.6 (26 Oct 2019 05:00), Max: 98.4 (25 Oct 2019 20:45)  HR: 78 (26 Oct 2019 08:35) (60 - 92)  BP: 93/61 (26 Oct 2019 08:35) (92/51 - 107/62)  BP(mean): 72 (26 Oct 2019 08:35) (65 - 79)  RR: 15 (26 Oct 2019 08:35) (15 - 19)  SpO2: 99% (26 Oct 2019 08:35) (99% - 100%)    Drug Dosing Weight  Height (cm): 175.26 (01 Feb 2019 07:15)  Weight (kg): 81.6 (23 Oct 2019 20:08)  BMI (kg/m2): 26.6 (23 Oct 2019 20:08)  BSA (m2): 1.97 (23 Oct 2019 20:08)     Physical Exam:  · Constitutional	detailed exam  · Constitutional Details	well-developed; well-groomed  · Eyes	EOMI; PERRL; no drainage or redness  · ENMT Comments	dry mucous membranes  · Respiratory	detailed exam  · Respiratory Comments	normal breath sounds at the lung bases bilaterally  · Cardiovascular	Regular rate & rhythm, normal S1, S2; no murmurs, gallops or rubs; no S3, S4  · Abd-Soft non tender  ·Ext-no edema, clubbing or cyanosis    URINARY CATHETER: [ ] YES [ ] NO     LABS:  CBC Full  -  ( 26 Oct 2019 06:26 )  WBC Count : 5.68 K/uL  RBC Count : 2.73 M/uL  Hemoglobin : 9.7 g/dL  Hematocrit : 29.0 %  Platelet Count - Automated : 42 K/uL  Mean Cell Volume : 106.2 fl  Mean Cell Hemoglobin : 35.5 pg  Mean Cell Hemoglobin Concentration : 33.4 gm/dL  Auto Neutrophil # : x  Auto Lymphocyte # : x  Auto Monocyte # : x  Auto Eosinophil # : x  Auto Basophil # : x  Auto Neutrophil % : x  Auto Lymphocyte % : x  Auto Monocyte % : x  Auto Eosinophil % : x  Auto Basophil % : x    10-26    142  |  107  |  12  ----------------------------<  87  3.9   |  24  |  0.68    Ca    9.8      26 Oct 2019 06:26  Phos  4.4     10-26  Mg     1.8     10-26      PT/INR - ( 25 Oct 2019 06:19 )   PT: 13.5 sec;   INR: 1.19          PTT - ( 25 Oct 2019 06:19 )  PTT:27.3 sec      CULTURES:    RADIOLOGY & ADDITIONAL STUDIES:

## 2019-10-27 LAB
-  CEFAZOLIN: SIGNIFICANT CHANGE UP
-  CLINDAMYCIN: SIGNIFICANT CHANGE UP
-  ERYTHROMYCIN: SIGNIFICANT CHANGE UP
-  LINEZOLID: SIGNIFICANT CHANGE UP
-  OXACILLIN: SIGNIFICANT CHANGE UP
-  RIFAMPIN: SIGNIFICANT CHANGE UP
-  TRIMETHOPRIM/SULFAMETHOXAZOLE: SIGNIFICANT CHANGE UP
-  VANCOMYCIN: SIGNIFICANT CHANGE UP
ANION GAP SERPL CALC-SCNC: 7 MMOL/L — SIGNIFICANT CHANGE UP (ref 5–17)
BUN SERPL-MCNC: 9 MG/DL — SIGNIFICANT CHANGE UP (ref 7–23)
CALCIUM SERPL-MCNC: 10.2 MG/DL — SIGNIFICANT CHANGE UP (ref 8.4–10.5)
CHLORIDE SERPL-SCNC: 106 MMOL/L — SIGNIFICANT CHANGE UP (ref 96–108)
CO2 SERPL-SCNC: 29 MMOL/L — SIGNIFICANT CHANGE UP (ref 22–31)
CREAT SERPL-MCNC: 0.68 MG/DL — SIGNIFICANT CHANGE UP (ref 0.5–1.3)
CRP SERPL-MCNC: 0.94 MG/DL — HIGH (ref 0–0.4)
CULTURE RESULTS: SIGNIFICANT CHANGE UP
ERYTHROCYTE [SEDIMENTATION RATE] IN BLOOD: 40 MM/HR — HIGH
GLUCOSE BLDC GLUCOMTR-MCNC: 114 MG/DL — HIGH (ref 70–99)
GLUCOSE BLDC GLUCOMTR-MCNC: 118 MG/DL — HIGH (ref 70–99)
GLUCOSE BLDC GLUCOMTR-MCNC: 93 MG/DL — SIGNIFICANT CHANGE UP (ref 70–99)
GLUCOSE BLDC GLUCOMTR-MCNC: 94 MG/DL — SIGNIFICANT CHANGE UP (ref 70–99)
GLUCOSE SERPL-MCNC: 98 MG/DL — SIGNIFICANT CHANGE UP (ref 70–99)
HCT VFR BLD CALC: 30 % — LOW (ref 34.5–45)
HGB BLD-MCNC: 10 G/DL — LOW (ref 11.5–15.5)
MAGNESIUM SERPL-MCNC: 1.9 MG/DL — SIGNIFICANT CHANGE UP (ref 1.6–2.6)
MCHC RBC-ENTMCNC: 33.3 GM/DL — SIGNIFICANT CHANGE UP (ref 32–36)
MCHC RBC-ENTMCNC: 36 PG — HIGH (ref 27–34)
MCV RBC AUTO: 107.9 FL — HIGH (ref 80–100)
METHOD TYPE: SIGNIFICANT CHANGE UP
MRSA PCR RESULT.: NEGATIVE — SIGNIFICANT CHANGE UP
NRBC # BLD: 0 /100 WBCS — SIGNIFICANT CHANGE UP (ref 0–0)
ORGANISM # SPEC MICROSCOPIC CNT: SIGNIFICANT CHANGE UP
ORGANISM # SPEC MICROSCOPIC CNT: SIGNIFICANT CHANGE UP
PHOSPHATE SERPL-MCNC: 5 MG/DL — HIGH (ref 2.5–4.5)
PLATELET # BLD AUTO: 42 K/UL — LOW (ref 150–400)
POTASSIUM SERPL-MCNC: 4.5 MMOL/L — SIGNIFICANT CHANGE UP (ref 3.5–5.3)
POTASSIUM SERPL-SCNC: 4.5 MMOL/L — SIGNIFICANT CHANGE UP (ref 3.5–5.3)
RBC # BLD: 2.78 M/UL — LOW (ref 3.8–5.2)
RBC # FLD: 12.6 % — SIGNIFICANT CHANGE UP (ref 10.3–14.5)
S AUREUS DNA NOSE QL NAA+PROBE: NEGATIVE — SIGNIFICANT CHANGE UP
SODIUM SERPL-SCNC: 142 MMOL/L — SIGNIFICANT CHANGE UP (ref 135–145)
SPECIMEN SOURCE: SIGNIFICANT CHANGE UP
WBC # BLD: 6.83 K/UL — SIGNIFICANT CHANGE UP (ref 3.8–10.5)
WBC # FLD AUTO: 6.83 K/UL — SIGNIFICANT CHANGE UP (ref 3.8–10.5)

## 2019-10-27 PROCEDURE — 70553 MRI BRAIN STEM W/O & W/DYE: CPT | Mod: 26

## 2019-10-27 RX ORDER — SODIUM CHLORIDE 9 MG/ML
1000 INJECTION INTRAMUSCULAR; INTRAVENOUS; SUBCUTANEOUS
Refills: 0 | Status: DISCONTINUED | OUTPATIENT
Start: 2019-10-28 | End: 2019-10-28

## 2019-10-27 RX ADMIN — Medication 650 MILLIGRAM(S): at 13:31

## 2019-10-27 RX ADMIN — Medication 300 MICROGRAM(S): at 12:30

## 2019-10-27 RX ADMIN — Medication 650 MILLIGRAM(S): at 12:31

## 2019-10-27 RX ADMIN — ELTROMBOPAG OLAMINE 50 MILLIGRAM(S): 50 TABLET, FILM COATED ORAL at 02:35

## 2019-10-27 RX ADMIN — Medication 650 MILLIGRAM(S): at 07:16

## 2019-10-27 RX ADMIN — Medication 650 MILLIGRAM(S): at 06:16

## 2019-10-27 NOTE — PROGRESS NOTE ADULT - SUBJECTIVE AND OBJECTIVE BOX
Surgery: cranial wound I & D   Consent: Signed by patient   penicillins (Unknown)  sulfa drugs (Unknown)        T(C): 36.4 (10-27-19 @ 08:10), Max: 37 (10-26-19 @ 17:34)  HR: 60 (10-27-19 @ 08:10) (60 - 75)  BP: 95/62 (10-27-19 @ 08:10) (90/60 - 102/66)  RR: 17 (10-27-19 @ 08:10) (16 - 17)  SpO2: 99% (10-27-19 @ 08:10) (99% - 100%)  Wt(kg): --          10-27    142  |  106  |  9   ----------------------------<  98  4.5   |  29  |  0.68    Ca    10.2      27 Oct 2019 06:32  Phos  5.0     10-27  Mg     1.9     10-27      CBC Full  -  ( 27 Oct 2019 06:32 )  WBC Count : 6.83 K/uL  RBC Count : 2.78 M/uL  Hemoglobin : 10.0 g/dL  Hematocrit : 30.0 %  Platelet Count - Automated : 42 K/uL  Mean Cell Volume : 107.9 fl  Mean Cell Hemoglobin : 36.0 pg  Mean Cell Hemoglobin Concentration : 33.3 gm/dL  Auto Neutrophil # : x  Auto Lymphocyte # : x  Auto Monocyte # : x  Auto Eosinophil # : x  Auto Basophil # : x  Auto Neutrophil % : x  Auto Lymphocyte % : x  Auto Monocyte % : x  Auto Eosinophil % : x  Auto Basophil % : x        Pregnancy test: pending  Type & Screen (in past 72hrs): sent to [facility]    CXR: < from: Xray Chest 1 View- PORTABLE-Urgent (10.23.19 @ 23:54) >  No acute infiltrates    < end of copied text >    EKG: NSR in chart  ECHO:n/a  Medical Clearances:   Other Clearances:  hematology    Last dose of antiplatelet/anticoagulation drug: n/a    Implanted Devices (pacemaker, drug pump...etc):  []YES   [x] NO                  If yes --> EPS consulted to interrogate/adjust device:                 Assessment: 48yo Female with frontal cranial wound dishescence and drainage. r/o infection    Plan:  -MRI [facility]ain w/wo   -hold Abx for now as per ID  -Low plt. Will transfuse starting Tonight with Plt as per   - plans to take pt to OR with  tomorrow available after 4pm  -D/W

## 2019-10-28 ENCOUNTER — RESULT REVIEW (OUTPATIENT)
Age: 49
End: 2019-10-28

## 2019-10-28 LAB
ALBUMIN SERPL ELPH-MCNC: 3.5 G/DL — SIGNIFICANT CHANGE UP (ref 3.3–5)
ALP SERPL-CCNC: 75 U/L — SIGNIFICANT CHANGE UP (ref 40–120)
ALT FLD-CCNC: 18 U/L — SIGNIFICANT CHANGE UP (ref 10–45)
ANION GAP SERPL CALC-SCNC: 11 MMOL/L — SIGNIFICANT CHANGE UP (ref 5–17)
ANION GAP SERPL CALC-SCNC: 8 MMOL/L — SIGNIFICANT CHANGE UP (ref 5–17)
AST SERPL-CCNC: 18 U/L — SIGNIFICANT CHANGE UP (ref 10–40)
BASOPHILS # BLD AUTO: 0 K/UL — SIGNIFICANT CHANGE UP (ref 0–0.2)
BASOPHILS NFR BLD AUTO: 0 % — SIGNIFICANT CHANGE UP (ref 0–2)
BILIRUB SERPL-MCNC: 0.5 MG/DL — SIGNIFICANT CHANGE UP (ref 0.2–1.2)
BUN SERPL-MCNC: 11 MG/DL — SIGNIFICANT CHANGE UP (ref 7–23)
BUN SERPL-MCNC: 13 MG/DL — SIGNIFICANT CHANGE UP (ref 7–23)
CALCIUM SERPL-MCNC: 9.3 MG/DL — SIGNIFICANT CHANGE UP (ref 8.4–10.5)
CALCIUM SERPL-MCNC: 9.5 MG/DL — SIGNIFICANT CHANGE UP (ref 8.4–10.5)
CHLORIDE SERPL-SCNC: 105 MMOL/L — SIGNIFICANT CHANGE UP (ref 96–108)
CHLORIDE SERPL-SCNC: 107 MMOL/L — SIGNIFICANT CHANGE UP (ref 96–108)
CO2 SERPL-SCNC: 23 MMOL/L — SIGNIFICANT CHANGE UP (ref 22–31)
CO2 SERPL-SCNC: 25 MMOL/L — SIGNIFICANT CHANGE UP (ref 22–31)
CREAT SERPL-MCNC: 0.6 MG/DL — SIGNIFICANT CHANGE UP (ref 0.5–1.3)
CREAT SERPL-MCNC: 0.68 MG/DL — SIGNIFICANT CHANGE UP (ref 0.5–1.3)
EOSINOPHIL # BLD AUTO: 0 K/UL — SIGNIFICANT CHANGE UP (ref 0–0.5)
EOSINOPHIL NFR BLD AUTO: 0 % — SIGNIFICANT CHANGE UP (ref 0–6)
GLUCOSE BLDC GLUCOMTR-MCNC: 187 MG/DL — HIGH (ref 70–99)
GLUCOSE BLDC GLUCOMTR-MCNC: 199 MG/DL — HIGH (ref 70–99)
GLUCOSE BLDC GLUCOMTR-MCNC: 73 MG/DL — SIGNIFICANT CHANGE UP (ref 70–99)
GLUCOSE BLDC GLUCOMTR-MCNC: 82 MG/DL — SIGNIFICANT CHANGE UP (ref 70–99)
GLUCOSE BLDC GLUCOMTR-MCNC: 86 MG/DL — SIGNIFICANT CHANGE UP (ref 70–99)
GLUCOSE SERPL-MCNC: 180 MG/DL — HIGH (ref 70–99)
GLUCOSE SERPL-MCNC: 95 MG/DL — SIGNIFICANT CHANGE UP (ref 70–99)
HCT VFR BLD CALC: 27.8 % — LOW (ref 34.5–45)
HCT VFR BLD CALC: 30.6 % — LOW (ref 34.5–45)
HCT VFR BLD CALC: 31.4 % — LOW (ref 34.5–45)
HGB BLD-MCNC: 10.5 G/DL — LOW (ref 11.5–15.5)
HGB BLD-MCNC: 9.2 G/DL — LOW (ref 11.5–15.5)
HGB BLD-MCNC: 9.9 G/DL — LOW (ref 11.5–15.5)
LYMPHOCYTES # BLD AUTO: 1.94 K/UL — SIGNIFICANT CHANGE UP (ref 1–3.3)
LYMPHOCYTES # BLD AUTO: 22.6 % — SIGNIFICANT CHANGE UP (ref 13–44)
MCHC RBC-ENTMCNC: 32.4 GM/DL — SIGNIFICANT CHANGE UP (ref 32–36)
MCHC RBC-ENTMCNC: 33.1 GM/DL — SIGNIFICANT CHANGE UP (ref 32–36)
MCHC RBC-ENTMCNC: 33.4 GM/DL — SIGNIFICANT CHANGE UP (ref 32–36)
MCHC RBC-ENTMCNC: 35 PG — HIGH (ref 27–34)
MCHC RBC-ENTMCNC: 35.1 PG — HIGH (ref 27–34)
MCHC RBC-ENTMCNC: 35.5 PG — HIGH (ref 27–34)
MCV RBC AUTO: 105 FL — HIGH (ref 80–100)
MCV RBC AUTO: 107.3 FL — HIGH (ref 80–100)
MCV RBC AUTO: 108.1 FL — HIGH (ref 80–100)
MONOCYTES # BLD AUTO: 0.22 K/UL — SIGNIFICANT CHANGE UP (ref 0–0.9)
MONOCYTES NFR BLD AUTO: 2.6 % — SIGNIFICANT CHANGE UP (ref 2–14)
NEUTROPHILS # BLD AUTO: 6.26 K/UL — SIGNIFICANT CHANGE UP (ref 1.8–7.4)
NEUTROPHILS NFR BLD AUTO: 66.1 % — SIGNIFICANT CHANGE UP (ref 43–77)
NRBC # BLD: 0 /100 WBCS — SIGNIFICANT CHANGE UP (ref 0–0)
NRBC # BLD: 0 /100 WBCS — SIGNIFICANT CHANGE UP (ref 0–0)
PLATELET # BLD AUTO: 146 K/UL — LOW (ref 150–400)
PLATELET # BLD AUTO: 169 K/UL — SIGNIFICANT CHANGE UP (ref 150–400)
PLATELET # BLD AUTO: 86 K/UL — LOW (ref 150–400)
POTASSIUM SERPL-MCNC: 3.7 MMOL/L — SIGNIFICANT CHANGE UP (ref 3.5–5.3)
POTASSIUM SERPL-MCNC: 4.1 MMOL/L — SIGNIFICANT CHANGE UP (ref 3.5–5.3)
POTASSIUM SERPL-SCNC: 3.7 MMOL/L — SIGNIFICANT CHANGE UP (ref 3.5–5.3)
POTASSIUM SERPL-SCNC: 4.1 MMOL/L — SIGNIFICANT CHANGE UP (ref 3.5–5.3)
PROT SERPL-MCNC: 6.3 G/DL — SIGNIFICANT CHANGE UP (ref 6–8.3)
RBC # BLD: 2.59 M/UL — LOW (ref 3.8–5.2)
RBC # BLD: 2.83 M/UL — LOW (ref 3.8–5.2)
RBC # BLD: 2.99 M/UL — LOW (ref 3.8–5.2)
RBC # FLD: 12.6 % — SIGNIFICANT CHANGE UP (ref 10.3–14.5)
RBC # FLD: 12.7 % — SIGNIFICANT CHANGE UP (ref 10.3–14.5)
RBC # FLD: 12.7 % — SIGNIFICANT CHANGE UP (ref 10.3–14.5)
SODIUM SERPL-SCNC: 138 MMOL/L — SIGNIFICANT CHANGE UP (ref 135–145)
SODIUM SERPL-SCNC: 141 MMOL/L — SIGNIFICANT CHANGE UP (ref 135–145)
WBC # BLD: 7.56 K/UL — SIGNIFICANT CHANGE UP (ref 3.8–10.5)
WBC # BLD: 8.57 K/UL — SIGNIFICANT CHANGE UP (ref 3.8–10.5)
WBC # BLD: 9.05 K/UL — SIGNIFICANT CHANGE UP (ref 3.8–10.5)
WBC # FLD AUTO: 7.56 K/UL — SIGNIFICANT CHANGE UP (ref 3.8–10.5)
WBC # FLD AUTO: 8.57 K/UL — SIGNIFICANT CHANGE UP (ref 3.8–10.5)
WBC # FLD AUTO: 9.05 K/UL — SIGNIFICANT CHANGE UP (ref 3.8–10.5)

## 2019-10-28 PROCEDURE — 99232 SBSQ HOSP IP/OBS MODERATE 35: CPT | Mod: GC

## 2019-10-28 PROCEDURE — 99232 SBSQ HOSP IP/OBS MODERATE 35: CPT

## 2019-10-28 PROCEDURE — 61320 CRNEC/CRNOT DRG ICR ABS STTL: CPT

## 2019-10-28 RX ORDER — OXYCODONE AND ACETAMINOPHEN 5; 325 MG/1; MG/1
1 TABLET ORAL EVERY 6 HOURS
Refills: 0 | Status: DISCONTINUED | OUTPATIENT
Start: 2019-10-28 | End: 2019-11-04

## 2019-10-28 RX ORDER — ONDANSETRON 8 MG/1
4 TABLET, FILM COATED ORAL ONCE
Refills: 0 | Status: DISCONTINUED | OUTPATIENT
Start: 2019-10-28 | End: 2019-10-28

## 2019-10-28 RX ORDER — HYDROMORPHONE HYDROCHLORIDE 2 MG/ML
0.5 INJECTION INTRAMUSCULAR; INTRAVENOUS; SUBCUTANEOUS EVERY 6 HOURS
Refills: 0 | Status: DISCONTINUED | OUTPATIENT
Start: 2019-10-28 | End: 2019-10-28

## 2019-10-28 RX ORDER — CEFEPIME 1 G/1
2000 INJECTION, POWDER, FOR SOLUTION INTRAMUSCULAR; INTRAVENOUS EVERY 8 HOURS
Refills: 0 | Status: DISCONTINUED | OUTPATIENT
Start: 2019-10-28 | End: 2019-10-28

## 2019-10-28 RX ORDER — HYDROMORPHONE HYDROCHLORIDE 2 MG/ML
0.5 INJECTION INTRAMUSCULAR; INTRAVENOUS; SUBCUTANEOUS EVERY 4 HOURS
Refills: 0 | Status: DISCONTINUED | OUTPATIENT
Start: 2019-10-28 | End: 2019-11-04

## 2019-10-28 RX ORDER — ACETAMINOPHEN 500 MG
650 TABLET ORAL EVERY 6 HOURS
Refills: 0 | Status: DISCONTINUED | OUTPATIENT
Start: 2019-10-28 | End: 2019-11-04

## 2019-10-28 RX ORDER — METOCLOPRAMIDE HCL 10 MG
10 TABLET ORAL ONCE
Refills: 0 | Status: COMPLETED | OUTPATIENT
Start: 2019-10-28 | End: 2019-10-28

## 2019-10-28 RX ORDER — CEFEPIME 1 G/1
2000 INJECTION, POWDER, FOR SOLUTION INTRAMUSCULAR; INTRAVENOUS EVERY 8 HOURS
Refills: 0 | Status: DISCONTINUED | OUTPATIENT
Start: 2019-10-28 | End: 2019-10-29

## 2019-10-28 RX ORDER — VANCOMYCIN HCL 1 G
1000 VIAL (EA) INTRAVENOUS EVERY 12 HOURS
Refills: 0 | Status: COMPLETED | OUTPATIENT
Start: 2019-10-28 | End: 2019-10-30

## 2019-10-28 RX ORDER — CEFEPIME 1 G/1
2000 INJECTION, POWDER, FOR SOLUTION INTRAMUSCULAR; INTRAVENOUS EVERY 8 HOURS
Refills: 0 | Status: DISCONTINUED | OUTPATIENT
Start: 2019-10-29 | End: 2019-10-31

## 2019-10-28 RX ORDER — SODIUM CHLORIDE 9 MG/ML
1000 INJECTION INTRAMUSCULAR; INTRAVENOUS; SUBCUTANEOUS
Refills: 0 | Status: DISCONTINUED | OUTPATIENT
Start: 2019-10-29 | End: 2019-10-30

## 2019-10-28 RX ORDER — CEFEPIME 1 G/1
1000 INJECTION, POWDER, FOR SOLUTION INTRAMUSCULAR; INTRAVENOUS EVERY 12 HOURS
Refills: 0 | Status: DISCONTINUED | OUTPATIENT
Start: 2019-10-28 | End: 2019-10-28

## 2019-10-28 RX ADMIN — SODIUM CHLORIDE 80 MILLILITER(S): 9 INJECTION INTRAMUSCULAR; INTRAVENOUS; SUBCUTANEOUS at 14:20

## 2019-10-28 RX ADMIN — Medication 10 MILLIGRAM(S): at 22:26

## 2019-10-28 RX ADMIN — ELTROMBOPAG OLAMINE 50 MILLIGRAM(S): 50 TABLET, FILM COATED ORAL at 02:54

## 2019-10-28 RX ADMIN — ONDANSETRON 4 MILLIGRAM(S): 8 TABLET, FILM COATED ORAL at 20:59

## 2019-10-28 RX ADMIN — Medication 2: at 22:37

## 2019-10-28 NOTE — DIETITIAN INITIAL EVALUATION ADULT. - ENERGY NEEDS
Ht (10/28 per pt): 175.26cm, Wt (10/28): 81.6kg, IBW: 65.9kg+/-10%, %IBW: 123%, BMI: 26.4   IBW used to calculate energy needs due to pt's current body weight exceeding 120% of IBW. Needs adjusted post-op, hypermetabolic state. Aim for higher end of kcal range.

## 2019-10-28 NOTE — PACU DISCHARGE NOTE - COMMENTS
Patient is hemodynamically stable and denies any complaints of discomfort.  Meets PACU discharge criteria.  Report given to unit RN.  Patient transported via stretcher on monitor.  Accompanied by RN and transport team.

## 2019-10-28 NOTE — DIETITIAN INITIAL EVALUATION ADULT. - OTHER INFO
48 yo female s/p Right craniotomy Stereotactic 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 Garcia 2/2019, s/p RTT/Chemo in [place] 5/2019, presents to ED [facility] with c/o wound drainage/dehiscence since 10/19/2019. Pt NPO as plan for OR today. Pt resting in bed, denies N/V, pain improving. Pt reports decreased appetite from ~February 2019 to August 2019 with subsequent ~30lb unintentional wt loss (~13% unintentional wt loss [id number]). Appetite improved, pt now consumes 3 meals/day, follows healthy diet, no difficulty chewing/swallowing, denies food allergies. Pt reports intentional 20lb wt loss during this time 2/2 healthy dietary/lifestyle changes. Clavicles protruding on NFPE but pt reports that this is her baseline even prior to her weight loss. No other significant findings on NFPE. Encouraged pt to consume adequate kcal/protein when diet advanced. Pt appeared amenable to recommendations. Please see below for full nutritional recommendations. RD to monitor and f/u per protocol.

## 2019-10-28 NOTE — PROGRESS NOTE ADULT - SUBJECTIVE AND OBJECTIVE BOX
HEALTH ISSUES - PROBLEM Dx:  Aplastic anemia: Aplastic anemia  Pancytopenia: Pancytopenia  Preoperative clearance: Preoperative clearance  Brain mass: Brain mass  Suspected deep vein thrombosis (DVT)  Wound dehiscence: Wound dehiscence          CHEMOTHERAPY REGIMEN:        Day:                          Diet:  Protocol:                                    IVF:      MEDICATIONS  (STANDING):  dextrose 5%. 1000 milliLiter(s) (50 mL/Hr) IV Continuous <Continuous>  dextrose 50% Injectable 12.5 Gram(s) IV Push once  eltrombopag 50 milliGRAM(s) Oral daily  filgrastim-sndz Injectable 300 MICROGram(s) SubCutaneous <User Schedule>  influenza   Vaccine 0.5 milliLiter(s) IntraMuscular once  insulin lispro (HumaLOG) corrective regimen sliding scale   SubCutaneous Before meals and at bedtime  senna 1 Tablet(s) Oral at bedtime  sodium chloride 0.9%. 1000 milliLiter(s) (80 mL/Hr) IV Continuous <Continuous>    MEDICATIONS  (PRN):  acetaminophen   Tablet .. 650 milliGRAM(s) Oral every 6 hours PRN Mild Pain (1 - 3)  dextrose 40% Gel 15 Gram(s) Oral once PRN Blood Glucose LESS THAN 70 milliGRAM(s)/deciliter  glucagon  Injectable 1 milliGRAM(s) IntraMuscular once PRN Glucose LESS THAN 70 milligrams/deciliter  ondansetron Injectable 4 milliGRAM(s) IV Push every 6 hours PRN Nausea and/or Vomiting      Allergies    penicillins (Unknown)  sulfa drugs (Unknown)    Intolerances        DVT Prophylaxis: [ ] YES [ ] NO      Antibiotics: [ ] YES [ ] NO    Pain Scale (1-10):       Location:    Vital Signs Last 24 Hrs  T(C): 36.3 (28 Oct 2019 14:10), Max: 36.7 (28 Oct 2019 12:26)  T(F): 97.4 (28 Oct 2019 14:10), Max: 98 (28 Oct 2019 12:26)  HR: 75 (28 Oct 2019 14:10) (68 - 80)  BP: 103/62 (28 Oct 2019 14:10) (92/60 - 109/71)  BP(mean): --  RR: 18 (28 Oct 2019 14:10) (14 - 18)  SpO2: 100% (28 Oct 2019 14:10) (97% - 100%)    Drug Dosing Weight  Height (cm): 175.26 (01 Feb 2019 07:15)  Weight (kg): 81.6 (28 Oct 2019 08:55)  BMI (kg/m2): 26.6 (28 Oct 2019 08:55)  BSA (m2): 1.97 (28 Oct 2019 08:55)     Physical Exam:  · Constitutional	detailed exam  · Constitutional Details	well-developed; well-groomed  · Eyes	EOMI; PERRL; no drainage or redness  · ENMT Comments	dry mucous membranes  · Respiratory	detailed exam  · Respiratory Comments	normal breath sounds at the lung bases bilaterally  · Cardiovascular	Regular rate & rhythm, normal S1, S2; no murmurs, gallops or rubs; no S3, S4  · Abd-Soft non tender  ·Ext-no edema, clubbing or cyanosis    URINARY CATHETER: [ ] YES [ ] NO     LABS:  CBC Full  -  ( 28 Oct 2019 16:16 )  WBC Count : 7.56 K/uL  RBC Count : 2.99 M/uL  Hemoglobin : 10.5 g/dL  Hematocrit : 31.4 %  Platelet Count - Automated : 146 K/uL  Mean Cell Volume : 105.0 fl  Mean Cell Hemoglobin : 35.1 pg  Mean Cell Hemoglobin Concentration : 33.4 gm/dL  Auto Neutrophil # : x  Auto Lymphocyte # : x  Auto Monocyte # : x  Auto Eosinophil # : x  Auto Basophil # : x  Auto Neutrophil % : x  Auto Lymphocyte % : x  Auto Monocyte % : x  Auto Eosinophil % : x  Auto Basophil % : x    10-28    138  |  105  |  13  ----------------------------<  95  4.1   |  25  |  0.68    Ca    9.5      28 Oct 2019 06:10  Phos  5.0     10-27  Mg     1.9     10-27            CULTURES:    RADIOLOGY & ADDITIONAL STUDIES:

## 2019-10-28 NOTE — PROGRESS NOTE ADULT - SUBJECTIVE AND OBJECTIVE BOX
NEUROSURGERY POST-OP NOTE:    Patient seen and examined in PACU, s/p exploration and washout of craniotomy wound with plastics closure, +ELIZABETH in place.     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 Garcia 2/2019, s/p RTT/Chemo in [place] 5/2019, presents to ED [facility] with c/o wound drainage since 10/19/2019.  Pt denies sob, cp, chills/fevers, photophobia, neck pain, acute visual changes, acute numbness or weakness. (23 Oct 2019 23:36)      Hospital course:   10/23: presents to ED [facility] with c/o wound drainage since 10/19/2019.   10/24: WILMA overnight.   10/25: WILMA aleksandra, pre op for washout revision this am- cancelled  10/26: WILMA overnight. Cherise following for pancytopenia. ID consulted. F/u OR plan for wound revision   10/27:  10/28: s/p exploration and washout of craniotomy wound with plastics closure, +ELIZABETH in place.       Vital Signs Last 24 Hrs  T(C): 37.2 (28 Oct 2019 19:57), Max: 37.2 (28 Oct 2019 19:57)  T(F): 98.9 (28 Oct 2019 19:57), Max: 98.9 (28 Oct 2019 19:57)  HR: 62 (28 Oct 2019 21:12) (62 - 92)  BP: 93/54 (28 Oct 2019 21:12) (92/60 - 133/67)  BP(mean): 67 (28 Oct 2019 21:12) (67 - 85)  RR: 18 (28 Oct 2019 21:12) (14 - 20)  SpO2: 96% (28 Oct 2019 21:12) (96% - 100%)    I&O's Summary    27 Oct 2019 07:01  -  28 Oct 2019 07:00  --------------------------------------------------------  IN: 600 mL / OUT: 1350 mL / NET: -750 mL    28 Oct 2019 07:01  -  28 Oct 2019 21:58  --------------------------------------------------------  IN: 1365 mL / OUT: 100 mL / NET: 1265 mL        PHYSICAL EXAM:  Neurological: AOx3, NAD, FC, speech coherent   CN II-XII: EOMI, PERRL, face symmetric, tongue midline   Motor: MAEx4 5/5 UE and LE B/L   SILT throughout  Incision/wound: crani incision clean, dry and intact; +head wrap in place  +ELIZABETH (SG) in place        TUBES/LINES:  [] Elliott  [] A-line  [] Lumbar Drain  [] Wound Drains  [] NGT   [] EVD   [] CVC  [] Other      DIET:  [] NPO  [x] Mechanical  [] Tube feeds    LABS:                        9.2    8.57  )-----------( 169      ( 28 Oct 2019 20:18 )             27.8     10-28    141  |  107  |  11  ----------------------------<  180<H>  3.7   |  23  |  0.60    Ca    9.3      28 Oct 2019 20:18  Phos  5.0     10-27  Mg     1.9     10-27    TPro  6.3  /  Alb  3.5  /  TBili  0.5  /  DBili  x   /  AST  18  /  ALT  18  /  AlkPhos  75  10-28            CAPILLARY BLOOD GLUCOSE      POCT Blood Glucose.: 187 mg/dL (28 Oct 2019 20:38)  POCT Blood Glucose.: 73 mg/dL (28 Oct 2019 16:57)  POCT Blood Glucose.: 82 mg/dL (28 Oct 2019 11:55)  POCT Blood Glucose.: 86 mg/dL (28 Oct 2019 06:49)      Drug Levels: [] N/A    CSF Analysis: [] N/A      Allergies    penicillins (Unknown)  sulfa drugs (Unknown)    Intolerances        Home Medications:      MEDICATIONS:  MEDICATIONS  (STANDING):  cefepime   IVPB 2000 milliGRAM(s) IV Intermittent every 8 hours  dextrose 5%. 1000 milliLiter(s) (50 mL/Hr) IV Continuous <Continuous>  dextrose 50% Injectable 12.5 Gram(s) IV Push once  eltrombopag 50 milliGRAM(s) Oral daily  filgrastim-sndz Injectable 300 MICROGram(s) SubCutaneous <User Schedule>  influenza   Vaccine 0.5 milliLiter(s) IntraMuscular once  insulin lispro (HumaLOG) corrective regimen sliding scale   SubCutaneous Before meals and at bedtime  senna 1 Tablet(s) Oral at bedtime  vancomycin  IVPB 1000 milliGRAM(s) IV Intermittent every 12 hours    MEDICATIONS  (PRN):  acetaminophen   Tablet .. 650 milliGRAM(s) Oral every 6 hours PRN Temp greater or equal to 38C (100.4F), Mild Pain (1 - 3)  dextrose 40% Gel 15 Gram(s) Oral once PRN Blood Glucose LESS THAN 70 milliGRAM(s)/deciliter  glucagon  Injectable 1 milliGRAM(s) IntraMuscular once PRN Glucose LESS THAN 70 milligrams/deciliter  HYDROmorphone  Injectable 0.5 milliGRAM(s) IV Push every 4 hours PRN Severe Pain (7 - 10)  ondansetron Injectable 4 milliGRAM(s) IV Push every 6 hours PRN Nausea and/or Vomiting  oxycodone    5 mG/acetaminophen 325 mG 1 Tablet(s) Oral every 6 hours PRN Moderate Pain (4 - 6)      CULTURES:  Culture Results:   Rare Staphylococcus capitis (10-24 @ 08:34)  Culture Results:   Specimen appears CONTAMINATED. Lab suggests repeat clean catch specimen. (10-24 @ 08:30)      RADIOLOGY & ADDITIONAL TESTS:      ASSESSMENT:  49y Female s/p exploration and washout of craniotomy wound with plastics closure, +ELIZABETH in place, POD#0    PLAN:  -neuro/vital checks  -pain control prn  -+ELIZABETH(SG) in place- monitor output  -f/u OR cultures- on vanco/cefepime for post op ppx- f/u ID recs  -CTH tomorrow am  -trend platelets- Cherise following   -continue neurpogen x 5 doses  -regular diet  -bowel regimen  -OOB/PT/OT  -d/w Dr. Fitzpatrick and Dr. Garcia       Assessment: present when checked     [] GCS   E   V   M     Heart Failure: [] Acute, [] acute on chronic, [] chronic   Heart Failure: [] Diastolic (HFpEF), [] Systolic (HRrEF), [] Combined (HFpEF and HFrEF), [] RHF, [] Pulm HTN, [] Other     [] IRMA, [] ATN, [] AIN, [] other   [] CKD1, [] CKD2, [] CKD3, [] CKD4, [] CKD5, [] ESRD     Encephalopathy: [] Metabolic, [] Hepatic, [] Toxic, [] Neurological, [] Other     Abnormal Nutritional Status: [] malnutrition (see nutrition note), []underweight: BMI <19, [] morbid obesity: BMI >40, [] Cachexia     [] Sepsis   [] Hypovolemic shock, [] Cardiogenic shock, [] Hemorrhagic shock, [] Neurogenic shock   [] Acute respiratory failure   [] Cerebral edema, [] Brain compression / herniation   [] Functional quadriplegia   [] Acute blood loss anemia

## 2019-10-28 NOTE — PROGRESS NOTE ADULT - SUBJECTIVE AND OBJECTIVE BOX
HPI:  Pt is 50yo female, R handed, known to Neurosurgery  s/p Right craniotomy Stereotactic 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 Garcia 2/2019, s/p RTT/Chemo in [place] 5/2019, presents to ED [facility] with c/o wound drainage since 10/19/2019.  Pt denies sob, cp, chills/fevers, photophobia, neck pain, acute visual changes, acute numbness or weakness. (23 Oct 2019 23:36)    OVERNIGHT EVENTS: WILMA o/n     Hospital course:   10/23: presents to ED [facility] with c/o wound drainage since 10/19/2019.   10/24: WILMA overnight.   10/25: WILMA aleksandra, pre op for washout revision this am- cancelled  10/26: WILAM overnight. Cherise following for pancytopenia. ID consulted. F/u OR plan for wound revision   10/28: WILMA o/n, c/o hip pain. preop consented       Vital Signs Last 24 Hrs  T(C): 36.6 (27 Oct 2019 21:00), Max: 36.6 (27 Oct 2019 21:00)  T(F): 97.9 (27 Oct 2019 21:00), Max: 97.9 (27 Oct 2019 21:00)  HR: 80 (27 Oct 2019 21:00) (60 - 80)  BP: 109/71 (27 Oct 2019 21:00) (92/65 - 109/71)  BP(mean): --  RR: 14 (27 Oct 2019 21:00) (14 - 17)  SpO2: 99% (27 Oct 2019 21:00) (98% - 100%)    I&O's Summary    26 Oct 2019 07:01  -  27 Oct 2019 07:00  --------------------------------------------------------  IN: 0 mL / OUT: 600 mL / NET: -600 mL    27 Oct 2019 07:01  -  28 Oct 2019 02:44  --------------------------------------------------------  IN: 600 mL / OUT: 1350 mL / NET: -750 mL        PHYSICAL EXAM:  Neurological: AOx3, NAD, FC, speech coherent   CN II-XII: EOMI, PERRL, face symmetric, tongue midline   Motor: MAEx4 5/5 UE and LE B/L   SILT throughout  Head: two spots where incision is dehisced, no active drainage, no obvious signs of gross infection      TUBES/LINES:  [] Elliott  [] Lumbar Drain  [] Wound Drains  [] Others      DIET:  [x] NPO  [] Mechanical  [] Tube feeds    LABS:                        10.0   6.83  )-----------( 42       ( 27 Oct 2019 06:32 )             30.0     10-27    142  |  106  |  9   ----------------------------<  98  4.5   |  29  |  0.68    Ca    10.2      27 Oct 2019 06:32  Phos  5.0     10-27  Mg     1.9     10-27              CAPILLARY BLOOD GLUCOSE      POCT Blood Glucose.: 118 mg/dL (27 Oct 2019 21:47)  POCT Blood Glucose.: 114 mg/dL (27 Oct 2019 17:19)  POCT Blood Glucose.: 94 mg/dL (27 Oct 2019 11:44)  POCT Blood Glucose.: 93 mg/dL (27 Oct 2019 06:44)      Drug Levels: [] N/A    CSF Analysis: [] N/A      Allergies    penicillins (Unknown)  sulfa drugs (Unknown)    Intolerances      MEDICATIONS:  Antibiotics:    Neuro:  acetaminophen   Tablet .. 650 milliGRAM(s) Oral every 6 hours PRN  ondansetron Injectable 4 milliGRAM(s) IV Push every 6 hours PRN    Anticoagulation:  eltrombopag 50 milliGRAM(s) Oral daily    OTHER:  dextrose 40% Gel 15 Gram(s) Oral once PRN  dextrose 50% Injectable 12.5 Gram(s) IV Push once  filgrastim-sndz Injectable 300 MICROGram(s) SubCutaneous <User Schedule>  glucagon  Injectable 1 milliGRAM(s) IntraMuscular once PRN  influenza   Vaccine 0.5 milliLiter(s) IntraMuscular once  insulin lispro (HumaLOG) corrective regimen sliding scale   SubCutaneous Before meals and at bedtime  senna 1 Tablet(s) Oral at bedtime    IVF:  dextrose 5%. 1000 milliLiter(s) IV Continuous <Continuous>  sodium chloride 0.9%. 1000 milliLiter(s) IV Continuous <Continuous>    CULTURES:  Culture Results:   Rare Staphylococcus capitis (10-24 @ 08:34)  Culture Results:   Specimen appears CONTAMINATED. Lab suggests repeat clean catch specimen. 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 Garcia 2/2019, s/p RTT/Chemo in [place] 5/2019, presents to ED [facility] with c/o wound drainage/dehiscence since 10/19/2019      PLAN:  -neuro/vital checks  -pain control prn  -wound care  -ID consulted- no abx for now  -Heme (Cherise) following- continue neupogen every other day x 5 days  -trend heme labs   -f/u potential OR plan next week  -d/w Dr. Garcia       Assessment: present when checked     [] GCS   E   V   M     Heart Failure: [] Acute, [] acute on chronic, [] chronic   Heart Failure: [] Diastolic (HFpEF), [] Systolic (HRrEF), [] Combined (HFpEF and HFrEF), [] RHF, [] Pulm HTN, [] Other     [] IRMA, [] ATN, [] AIN, [] other   [] CKD1, [] CKD2, [] CKD3, [] CKD4, [] CKD5, [] ESRD     Encephalopathy: [] Metabolic, [] Hepatic, [] Toxic, [] Neurological, [] Other     Abnormal Nutritional Status: [] malnutrition (see nutrition note), []underweight: BMI <19, [] morbid obesity: BMI >40, [] Cachexia     [] Sepsis   [] Hypovolemic shock, [] Cardiogenic shock, [] Hemorrhagic shock, [] Neurogenic shock   [] Acute respiratory failure   [] Cerebral edema, [] Brain compression / herniation   [] Functional quadriplegia   [] Acute blood loss anemia

## 2019-10-28 NOTE — PROGRESS NOTE ADULT - ASSESSMENT
48yo female, R handed, known to Neurosurgery  s/p Right craniotomy Stereotactic 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 Garcia 2/2019, s/p RTT/Chemo in [place] 5/2019, presents to ED [facility] with c/o wound drainage since 10/19/2019.      Recommendations:    - for OR ppx: would clarify pcn allergy, if anaphylaxis would switch to IV Vancomycin for ppx (vs. Cefazolin)  - F/u OR culture     ID team 1 to follow. Plan discussed with Dr. Calixto.

## 2019-10-28 NOTE — PROGRESS NOTE ADULT - SUBJECTIVE AND OBJECTIVE BOX
O/N Events: WILMA  Subjective/ROS: Awaiting OR today. 12pt ROS otherwise negative.    VITALS  Vital Signs Last 24 Hrs  T(C): 36.3 (28 Oct 2019 14:10), Max: 36.7 (28 Oct 2019 12:26)  T(F): 97.4 (28 Oct 2019 14:10), Max: 98 (28 Oct 2019 12:26)  HR: 75 (28 Oct 2019 14:10) (68 - 80)  BP: 103/62 (28 Oct 2019 14:10) (92/60 - 109/71)  BP(mean): --  RR: 18 (28 Oct 2019 14:10) (14 - 18)  SpO2: 100% (28 Oct 2019 14:10) (97% - 100%)    CAPILLARY BLOOD GLUCOSE      POCT Blood Glucose.: 82 mg/dL (28 Oct 2019 11:55)  POCT Blood Glucose.: 86 mg/dL (28 Oct 2019 06:49)  POCT Blood Glucose.: 118 mg/dL (27 Oct 2019 21:47)  POCT Blood Glucose.: 114 mg/dL (27 Oct 2019 17:19)      PHYSICAL EXAM  Constitutional:  non-toxic, no distress  Head:  frontal scalp wound:  non-tender to touch, no erythema, no drainage   Eyes: no icterus   Ear/Nose/Throat: no oral lesion, no sinus tenderness on percussion	  Neck:no JVD, no lymphadenopathy, supple  Respiratory: CTA layla  Cardiovascular: S1S2 RRR, no murmurs  Gastrointestinal:soft, (+) BS, no HSM  Extremities:no e/e/c  Vascular: DP Pulse:	right normal; left normal    MEDICATIONS  (STANDING):  dextrose 5%. 1000 milliLiter(s) (50 mL/Hr) IV Continuous <Continuous>  dextrose 50% Injectable 12.5 Gram(s) IV Push once  eltrombopag 50 milliGRAM(s) Oral daily  filgrastim-sndz Injectable 300 MICROGram(s) SubCutaneous <User Schedule>  influenza   Vaccine 0.5 milliLiter(s) IntraMuscular once  insulin lispro (HumaLOG) corrective regimen sliding scale   SubCutaneous Before meals and at bedtime  senna 1 Tablet(s) Oral at bedtime  sodium chloride 0.9%. 1000 milliLiter(s) (80 mL/Hr) IV Continuous <Continuous>    MEDICATIONS  (PRN):  acetaminophen   Tablet .. 650 milliGRAM(s) Oral every 6 hours PRN Mild Pain (1 - 3)  dextrose 40% Gel 15 Gram(s) Oral once PRN Blood Glucose LESS THAN 70 milliGRAM(s)/deciliter  glucagon  Injectable 1 milliGRAM(s) IntraMuscular once PRN Glucose LESS THAN 70 milligrams/deciliter  ondansetron Injectable 4 milliGRAM(s) IV Push every 6 hours PRN Nausea and/or Vomiting      penicillins (Unknown)  sulfa drugs (Unknown)      LABS                        9.9    9.05  )-----------( 86       ( 28 Oct 2019 06:10 )             30.6     10-28    138  |  105  |  13  ----------------------------<  95  4.1   |  25  |  0.68    Ca    9.5      28 Oct 2019 06:10  Phos  5.0     10-27  Mg     1.9     10-27                IMAGING/EKG/ETC  EKG:  Xray:  CT:  MRI:

## 2019-10-29 DIAGNOSIS — Z98.890 OTHER SPECIFIED POSTPROCEDURAL STATES: ICD-10-CM

## 2019-10-29 LAB
ANION GAP SERPL CALC-SCNC: 10 MMOL/L — SIGNIFICANT CHANGE UP (ref 5–17)
BUN SERPL-MCNC: 11 MG/DL — SIGNIFICANT CHANGE UP (ref 7–23)
CALCIUM SERPL-MCNC: 9.7 MG/DL — SIGNIFICANT CHANGE UP (ref 8.4–10.5)
CHLORIDE SERPL-SCNC: 104 MMOL/L — SIGNIFICANT CHANGE UP (ref 96–108)
CO2 SERPL-SCNC: 26 MMOL/L — SIGNIFICANT CHANGE UP (ref 22–31)
CREAT SERPL-MCNC: 0.62 MG/DL — SIGNIFICANT CHANGE UP (ref 0.5–1.3)
CRP SERPL-MCNC: 1.54 MG/DL — HIGH (ref 0–0.4)
CULTURE RESULTS: SIGNIFICANT CHANGE UP
CULTURE RESULTS: SIGNIFICANT CHANGE UP
ERYTHROCYTE [SEDIMENTATION RATE] IN BLOOD: 58 MM/HR — HIGH
FERRITIN SERPL-MCNC: 1006 NG/ML — HIGH (ref 15–150)
GLUCOSE BLDC GLUCOMTR-MCNC: 108 MG/DL — HIGH (ref 70–99)
GLUCOSE BLDC GLUCOMTR-MCNC: 139 MG/DL — HIGH (ref 70–99)
GLUCOSE BLDC GLUCOMTR-MCNC: 158 MG/DL — HIGH (ref 70–99)
GLUCOSE BLDC GLUCOMTR-MCNC: 85 MG/DL — SIGNIFICANT CHANGE UP (ref 70–99)
GLUCOSE SERPL-MCNC: 175 MG/DL — HIGH (ref 70–99)
GRAM STN FLD: SIGNIFICANT CHANGE UP
HCT VFR BLD CALC: 27.8 % — LOW (ref 34.5–45)
HGB BLD-MCNC: 9.2 G/DL — LOW (ref 11.5–15.5)
IRON SATN MFR SERPL: 29 % — SIGNIFICANT CHANGE UP (ref 14–50)
IRON SATN MFR SERPL: 75 UG/DL — SIGNIFICANT CHANGE UP (ref 30–160)
MAGNESIUM SERPL-MCNC: 1.8 MG/DL — SIGNIFICANT CHANGE UP (ref 1.6–2.6)
MCHC RBC-ENTMCNC: 33.1 GM/DL — SIGNIFICANT CHANGE UP (ref 32–36)
MCHC RBC-ENTMCNC: 35.7 PG — HIGH (ref 27–34)
MCV RBC AUTO: 107.8 FL — HIGH (ref 80–100)
NIGHT BLUE STAIN TISS: SIGNIFICANT CHANGE UP
NRBC # BLD: 0 /100 WBCS — SIGNIFICANT CHANGE UP (ref 0–0)
PHOSPHATE SERPL-MCNC: 3.8 MG/DL — SIGNIFICANT CHANGE UP (ref 2.5–4.5)
PLATELET # BLD AUTO: 128 K/UL — LOW (ref 150–400)
POTASSIUM SERPL-MCNC: 4.5 MMOL/L — SIGNIFICANT CHANGE UP (ref 3.5–5.3)
POTASSIUM SERPL-SCNC: 4.5 MMOL/L — SIGNIFICANT CHANGE UP (ref 3.5–5.3)
RBC # BLD: 2.58 M/UL — LOW (ref 3.8–5.2)
RBC # FLD: 12.7 % — SIGNIFICANT CHANGE UP (ref 10.3–14.5)
SODIUM SERPL-SCNC: 140 MMOL/L — SIGNIFICANT CHANGE UP (ref 135–145)
SPECIMEN SOURCE: SIGNIFICANT CHANGE UP
TIBC SERPL-MCNC: 263 UG/DL — SIGNIFICANT CHANGE UP (ref 220–430)
TRANSFERRIN SERPL-MCNC: 189 MG/DL — LOW (ref 200–360)
UIBC SERPL-MCNC: 188 UG/DL — SIGNIFICANT CHANGE UP (ref 110–370)
WBC # BLD: 8.31 K/UL — SIGNIFICANT CHANGE UP (ref 3.8–10.5)
WBC # FLD AUTO: 8.31 K/UL — SIGNIFICANT CHANGE UP (ref 3.8–10.5)

## 2019-10-29 PROCEDURE — 99232 SBSQ HOSP IP/OBS MODERATE 35: CPT

## 2019-10-29 PROCEDURE — 99232 SBSQ HOSP IP/OBS MODERATE 35: CPT | Mod: GC

## 2019-10-29 PROCEDURE — 70450 CT HEAD/BRAIN W/O DYE: CPT | Mod: 26

## 2019-10-29 RX ORDER — ONDANSETRON 8 MG/1
4 TABLET, FILM COATED ORAL ONCE
Refills: 0 | Status: COMPLETED | OUTPATIENT
Start: 2019-10-29 | End: 2019-10-29

## 2019-10-29 RX ADMIN — HYDROMORPHONE HYDROCHLORIDE 0.5 MILLIGRAM(S): 2 INJECTION INTRAMUSCULAR; INTRAVENOUS; SUBCUTANEOUS at 19:10

## 2019-10-29 RX ADMIN — HYDROMORPHONE HYDROCHLORIDE 0.5 MILLIGRAM(S): 2 INJECTION INTRAMUSCULAR; INTRAVENOUS; SUBCUTANEOUS at 09:32

## 2019-10-29 RX ADMIN — ONDANSETRON 4 MILLIGRAM(S): 8 TABLET, FILM COATED ORAL at 01:25

## 2019-10-29 RX ADMIN — Medication 250 MILLIGRAM(S): at 18:25

## 2019-10-29 RX ADMIN — Medication 300 MICROGRAM(S): at 13:34

## 2019-10-29 RX ADMIN — HYDROMORPHONE HYDROCHLORIDE 0.5 MILLIGRAM(S): 2 INJECTION INTRAMUSCULAR; INTRAVENOUS; SUBCUTANEOUS at 13:40

## 2019-10-29 RX ADMIN — Medication 250 MILLIGRAM(S): at 05:01

## 2019-10-29 RX ADMIN — ONDANSETRON 4 MILLIGRAM(S): 8 TABLET, FILM COATED ORAL at 09:32

## 2019-10-29 RX ADMIN — ELTROMBOPAG OLAMINE 50 MILLIGRAM(S): 50 TABLET, FILM COATED ORAL at 03:35

## 2019-10-29 RX ADMIN — SENNA PLUS 1 TABLET(S): 8.6 TABLET ORAL at 21:05

## 2019-10-29 RX ADMIN — OXYCODONE AND ACETAMINOPHEN 1 TABLET(S): 5; 325 TABLET ORAL at 13:35

## 2019-10-29 RX ADMIN — ONDANSETRON 4 MILLIGRAM(S): 8 TABLET, FILM COATED ORAL at 17:45

## 2019-10-29 RX ADMIN — Medication 2: at 07:50

## 2019-10-29 RX ADMIN — HYDROMORPHONE HYDROCHLORIDE 0.5 MILLIGRAM(S): 2 INJECTION INTRAMUSCULAR; INTRAVENOUS; SUBCUTANEOUS at 04:58

## 2019-10-29 RX ADMIN — CEFEPIME 100 MILLIGRAM(S): 1 INJECTION, POWDER, FOR SOLUTION INTRAMUSCULAR; INTRAVENOUS at 12:15

## 2019-10-29 RX ADMIN — OXYCODONE AND ACETAMINOPHEN 1 TABLET(S): 5; 325 TABLET ORAL at 01:11

## 2019-10-29 RX ADMIN — OXYCODONE AND ACETAMINOPHEN 1 TABLET(S): 5; 325 TABLET ORAL at 12:16

## 2019-10-29 RX ADMIN — HYDROMORPHONE HYDROCHLORIDE 0.5 MILLIGRAM(S): 2 INJECTION INTRAMUSCULAR; INTRAVENOUS; SUBCUTANEOUS at 10:00

## 2019-10-29 RX ADMIN — HYDROMORPHONE HYDROCHLORIDE 0.5 MILLIGRAM(S): 2 INJECTION INTRAMUSCULAR; INTRAVENOUS; SUBCUTANEOUS at 05:24

## 2019-10-29 RX ADMIN — HYDROMORPHONE HYDROCHLORIDE 0.5 MILLIGRAM(S): 2 INJECTION INTRAMUSCULAR; INTRAVENOUS; SUBCUTANEOUS at 18:18

## 2019-10-29 RX ADMIN — HYDROMORPHONE HYDROCHLORIDE 0.5 MILLIGRAM(S): 2 INJECTION INTRAMUSCULAR; INTRAVENOUS; SUBCUTANEOUS at 14:11

## 2019-10-29 RX ADMIN — CEFEPIME 100 MILLIGRAM(S): 1 INJECTION, POWDER, FOR SOLUTION INTRAMUSCULAR; INTRAVENOUS at 19:25

## 2019-10-29 RX ADMIN — OXYCODONE AND ACETAMINOPHEN 1 TABLET(S): 5; 325 TABLET ORAL at 02:00

## 2019-10-29 RX ADMIN — CEFEPIME 100 MILLIGRAM(S): 1 INJECTION, POWDER, FOR SOLUTION INTRAMUSCULAR; INTRAVENOUS at 04:02

## 2019-10-29 NOTE — PROGRESS NOTE ADULT - SUBJECTIVE AND OBJECTIVE BOX
INFECTIOUS DISEASES FOLLOW UP    This is a follow up note for this  49yFemale with  WOUND drainage s/p neurosurgery      ROS:  General: no fever, no chills  Ophthalmologic: no change in vision  ENMT: no sore throat  Respiratory and Thorax: no SOB, no cough  Cardiovascular: no CP  Gastrointestinal: no abd pain, N/V/D  Genitourinary: no dysuria  Musculoskeletal: no joint pain  Neurological: no HA  Psychiatric: no anxiety/depression  Hematology/Lymphatics: no abnormal bleeding  Endocrine: no changes in weight    Allergies    penicillins (Unknown)  sulfa drugs (Unknown)    Intolerances        ANTIBIOTICS/RELEVANT:  antimicrobials  cefepime   IVPB 2000 milliGRAM(s) IV Intermittent every 8 hours  vancomycin  IVPB 1000 milliGRAM(s) IV Intermittent every 12 hours    immunologic:  filgrastim-sndz Injectable 300 MICROGram(s) SubCutaneous <User Schedule>  influenza   Vaccine 0.5 milliLiter(s) IntraMuscular once    OTHER:  acetaminophen   Tablet .. 650 milliGRAM(s) Oral every 6 hours PRN  dextrose 40% Gel 15 Gram(s) Oral once PRN  dextrose 5%. 1000 milliLiter(s) IV Continuous <Continuous>  dextrose 50% Injectable 12.5 Gram(s) IV Push once  eltrombopag 50 milliGRAM(s) Oral daily  glucagon  Injectable 1 milliGRAM(s) IntraMuscular once PRN  HYDROmorphone  Injectable 0.5 milliGRAM(s) IV Push every 4 hours PRN  insulin lispro (HumaLOG) corrective regimen sliding scale   SubCutaneous Before meals and at bedtime  ondansetron Injectable 4 milliGRAM(s) IV Push every 6 hours PRN  oxycodone    5 mG/acetaminophen 325 mG 1 Tablet(s) Oral every 6 hours PRN  senna 1 Tablet(s) Oral at bedtime  sodium chloride 0.9%. 1000 milliLiter(s) IV Continuous <Continuous>      Objective:  Vital Signs Last 24 Hrs  T(C): 36.3 (29 Oct 2019 09:33), Max: 37.2 (28 Oct 2019 19:57)  T(F): 97.3 (29 Oct 2019 09:33), Max: 98.9 (28 Oct 2019 19:57)  HR: 54 (29 Oct 2019 09:33) (54 - 92)  BP: 102/60 (29 Oct 2019 09:33) (11/68 - 133/67)  BP(mean): 88 (28 Oct 2019 23:00) (67 - 88)  RR: 17 (29 Oct 2019 09:33) (12 - 20)  SpO2: 100% (29 Oct 2019 09:33) (95% - 100%)    PHYSICAL EXAM:  Vital Signs Last 24 Hrs  T(C): 36.3 (29 Oct 2019 09:33), Max: 37.2 (28 Oct 2019 19:57)  T(F): 97.3 (29 Oct 2019 09:33), Max: 98.9 (28 Oct 2019 19:57)  HR: 54 (29 Oct 2019 09:33) (54 - 92)  BP: 102/60 (29 Oct 2019 09:33) (11/68 - 133/67)  BP(mean): 88 (28 Oct 2019 23:00) (67 - 88)  RR: 17 (29 Oct 2019 09:33) (12 - 20)  SpO2: 100% (29 Oct 2019 09:33) (95% - 100%)    Constitutional:  Eyes:  ENMT:  Respiratory:  Cardiovascular:  Gastrointestinal:  Genitourinary:  Extremities:  Neurological:  Skin:  Lymph Nodes:  Musculoskeletal:  Psychiatric:          LABS:                        9.2    8.31  )-----------( 128      ( 29 Oct 2019 06:50 )             27.8     10-29    140  |  104  |  11  ----------------------------<  175<H>  4.5   |  26  |  0.62    Ca    9.7      29 Oct 2019 06:50  Phos  3.8     10-29  Mg     1.8     10-29    TPro  6.3  /  Alb  3.5  /  TBili  0.5  /  DBili  x   /  AST  18  /  ALT  18  /  AlkPhos  75  10-28          MICROBIOLOGY:  Culture Results:   Testing in progress (10-29 @ 01:40)  Culture Results:   Testing in progress (10-29 @ 01:23)  Culture Results:   Testing in progress (10-29 @ 01:23)  Culture Results:   Testing in progress (10-29 @ 01:22)  Culture Results:   No growth to date (10-28 @ 21:12)  Culture Results:   No growth to date (10-28 @ 21:12)  Culture Results:   No growth to date (10-28 @ 21:12)  Culture Results:   Culture in progress (10-28 @ 21:06)            RECENT CULTURES:  10-29 @ 01:40  .Other subgalia #2  --  --  --    Testing in progress  --  10-29 @ 01:23  .Other scalp wound  --  --  --    Testing in progress  --  10-29 @ 01:22  .Other epidural culture  --  --  --    Testing in progress  --  10-28 @ 21:12  .Surgical Swab subgalia  --  --  --    No growth to date  --  10-28 @ 21:06  .Surgical Swab scalp wound  --  --  --    Culture in progress  --  10-24 @ 08:34  .Other Right front scalp wound superf  Staphylococcus capitis  Staphylococcus capitis  JING    Rare Staphylococcus capitis  --  10-24 @ 08:30  .Urine Clean Catch (Midstream)  --  --  --    Specimen appears CONTAMINATED. Lab suggests repeat clean catch specimen.  --  10-24 @ 04:23  .Blood Blood-Peripheral  --  --  --    No growth at 5 days.  --      RADIOLOGY & ADDITIONAL STUDIES: INFECTIOUS DISEASES FOLLOW UP    This is a follow up note for this  49yFemale with  WOUND drainage s/p neurosurgery      ROS/S: No fevers/ chills. Mild intermittent headache improved after medication. ROS otherwise negative.      Allergies    penicillins (Unknown)  sulfa drugs (Unknown)    Intolerances        ANTIBIOTICS/RELEVANT:  antimicrobials  cefepime   IVPB 2000 milliGRAM(s) IV Intermittent every 8 hours  vancomycin  IVPB 1000 milliGRAM(s) IV Intermittent every 12 hours    immunologic:  filgrastim-sndz Injectable 300 MICROGram(s) SubCutaneous <User Schedule>  influenza   Vaccine 0.5 milliLiter(s) IntraMuscular once    OTHER:  acetaminophen   Tablet .. 650 milliGRAM(s) Oral every 6 hours PRN  dextrose 40% Gel 15 Gram(s) Oral once PRN  dextrose 5%. 1000 milliLiter(s) IV Continuous <Continuous>  dextrose 50% Injectable 12.5 Gram(s) IV Push once  eltrombopag 50 milliGRAM(s) Oral daily  glucagon  Injectable 1 milliGRAM(s) IntraMuscular once PRN  HYDROmorphone  Injectable 0.5 milliGRAM(s) IV Push every 4 hours PRN  insulin lispro (HumaLOG) corrective regimen sliding scale   SubCutaneous Before meals and at bedtime  ondansetron Injectable 4 milliGRAM(s) IV Push every 6 hours PRN  oxycodone    5 mG/acetaminophen 325 mG 1 Tablet(s) Oral every 6 hours PRN  senna 1 Tablet(s) Oral at bedtime  sodium chloride 0.9%. 1000 milliLiter(s) IV Continuous <Continuous>      Objective:  Vital Signs Last 24 Hrs  T(C): 36.3 (29 Oct 2019 09:33), Max: 37.2 (28 Oct 2019 19:57)  T(F): 97.3 (29 Oct 2019 09:33), Max: 98.9 (28 Oct 2019 19:57)  HR: 54 (29 Oct 2019 09:33) (54 - 92)  BP: 102/60 (29 Oct 2019 09:33) (11/68 - 133/67)  BP(mean): 88 (28 Oct 2019 23:00) (67 - 88)  RR: 17 (29 Oct 2019 09:33) (12 - 20)  SpO2: 100% (29 Oct 2019 09:33) (95% - 100%)    PHYSICAL EXAM:  Vital Signs Last 24 Hrs  T(C): 36.3 (29 Oct 2019 09:33), Max: 37.2 (28 Oct 2019 19:57)  T(F): 97.3 (29 Oct 2019 09:33), Max: 98.9 (28 Oct 2019 19:57)  HR: 54 (29 Oct 2019 09:33) (54 - 92)  BP: 102/60 (29 Oct 2019 09:33) (11/68 - 133/67)  BP(mean): 88 (28 Oct 2019 23:00) (67 - 88)  RR: 17 (29 Oct 2019 09:33) (12 - 20)  SpO2: 100% (29 Oct 2019 09:33) (95% - 100%)    PHYSICAL EXAM  Constitutional:  laying comfortably in bed, non-toxic, no distress  Head:  frontal scalp wound w/ overlaying bandage, non-tender to touch, no erythema, drain in place draining pink serosanguinous fluid   Eyes: no icterus, EOMI intact    Respiratory: CTA layla  Cardiovascular: S1S2 RRR, no murmurs  Gastrointestinal: soft, NT/ ND  Extremities: no edema  neuro: mentating well and following commands      LABS:                        9.2    8.31  )-----------( 128      ( 29 Oct 2019 06:50 )             27.8     10-29    140  |  104  |  11  ----------------------------<  175<H>  4.5   |  26  |  0.62    Ca    9.7      29 Oct 2019 06:50  Phos  3.8     10-29  Mg     1.8     10-29    TPro  6.3  /  Alb  3.5  /  TBili  0.5  /  DBili  x   /  AST  18  /  ALT  18  /  AlkPhos  75  10-28          MICROBIOLOGY:  Culture Results:   Testing in progress (10-29 @ 01:40)  Culture Results:   Testing in progress (10-29 @ 01:23)  Culture Results:   Testing in progress (10-29 @ 01:23)  Culture Results:   Testing in progress (10-29 @ 01:22)  Culture Results:   No growth to date (10-28 @ 21:12)  Culture Results:   No growth to date (10-28 @ 21:12)  Culture Results:   No growth to date (10-28 @ 21:12)  Culture Results:   Culture in progress (10-28 @ 21:06)            RECENT CULTURES:  10-29 @ 01:40  .Other subgalia #2  --  --  --    Testing in progress  --  10-29 @ 01:23  .Other scalp wound  --  --  --    Testing in progress  --  10-29 @ 01:22  .Other epidural culture  --  --  --    Testing in progress  --  10-28 @ 21:12  .Surgical Swab subgalia  --  --  --    No growth to date  --  10-28 @ 21:06  .Surgical Swab scalp wound  --  --  --    Culture in progress  --  10-24 @ 08:34  .Other Right front scalp wound superf  Staphylococcus capitis  Staphylococcus capitis  JING    Rare Staphylococcus capitis  --  10-24 @ 08:30  .Urine Clean Catch (Midstream)  --  --  --    Specimen appears CONTAMINATED. Lab suggests repeat clean catch specimen.  --  10-24 @ 04:23  .Blood Blood-Peripheral  --  --  --    No growth at 5 days.  --      RADIOLOGY & ADDITIONAL STUDIES:

## 2019-10-29 NOTE — PROGRESS NOTE ADULT - SUBJECTIVE AND OBJECTIVE BOX
HEALTH ISSUES - PROBLEM Dx:  Postoperative state: Postoperative state  Aplastic anemia: Aplastic anemia  Pancytopenia: Pancytopenia  Preoperative clearance: Preoperative clearance  Brain mass: Brain mass  Suspected deep vein thrombosis (DVT)  Wound dehiscence: Wound dehiscence          CHEMOTHERAPY REGIMEN:        Day:                          Diet:  Protocol:                                    IVF:      MEDICATIONS  (STANDING):  cefepime   IVPB 2000 milliGRAM(s) IV Intermittent every 8 hours  dextrose 5%. 1000 milliLiter(s) (50 mL/Hr) IV Continuous <Continuous>  dextrose 50% Injectable 12.5 Gram(s) IV Push once  filgrastim-sndz Injectable 300 MICROGram(s) SubCutaneous <User Schedule>  influenza   Vaccine 0.5 milliLiter(s) IntraMuscular once  insulin lispro (HumaLOG) corrective regimen sliding scale   SubCutaneous Before meals and at bedtime  senna 1 Tablet(s) Oral at bedtime    MEDICATIONS  (PRN):  acetaminophen   Tablet .. 650 milliGRAM(s) Oral every 6 hours PRN Temp greater or equal to 38C (100.4F), Mild Pain (1 - 3)  dextrose 40% Gel 15 Gram(s) Oral once PRN Blood Glucose LESS THAN 70 milliGRAM(s)/deciliter  glucagon  Injectable 1 milliGRAM(s) IntraMuscular once PRN Glucose LESS THAN 70 milligrams/deciliter  HYDROmorphone  Injectable 0.5 milliGRAM(s) IV Push every 4 hours PRN Severe Pain (7 - 10)  ondansetron Injectable 4 milliGRAM(s) IV Push every 6 hours PRN Nausea and/or Vomiting  oxycodone    5 mG/acetaminophen 325 mG 1 Tablet(s) Oral every 6 hours PRN Moderate Pain (4 - 6)      Allergies    penicillins (Unknown)  sulfa drugs (Unknown)    Intolerances        DVT Prophylaxis: [ ] YES [ ] NO      Antibiotics: [ ] YES [ ] NO    Pain Scale (1-10):       Location:    Vital Signs Last 24 Hrs  T(C): 36.6 (30 Oct 2019 16:25), Max: 36.9 (30 Oct 2019 08:30)  T(F): 97.9 (30 Oct 2019 16:25), Max: 98.4 (30 Oct 2019 08:30)  HR: 73 (30 Oct 2019 16:25) (62 - 73)  BP: 104/67 (30 Oct 2019 17:15) (93/60 - 106/73)  BP(mean): --  RR: 15 (30 Oct 2019 16:25) (15 - 17)  SpO2: 100% (30 Oct 2019 16:25) (95% - 100%)    Drug Dosing Weight  Height (cm): 175.3 (29 Oct 2019 00:20)  Weight (kg): 81.6 (28 Oct 2019 08:55)  BMI (kg/m2): 26.6 (29 Oct 2019 00:20)  BSA (m2): 1.98 (29 Oct 2019 00:20)     Physical Exam:  · Constitutional	detailed exam  · Constitutional Details	well-developed; well-groomed  · Eyes	EOMI; PERRL; no drainage or redness  · ENMT Comments	dry mucous membranes  · Respiratory	detailed exam  · Respiratory Comments	normal breath sounds at the lung bases bilaterally  · Cardiovascular	Regular rate & rhythm, normal S1, S2; no murmurs, gallops or rubs; no S3, S4  · Abd-Soft non tender  ·Ext-no edema, clubbing or cyanosis    URINARY CATHETER: [ ] YES [ ] NO     LABS:  CBC Full  -  ( 30 Oct 2019 06:50 )  WBC Count : 13.28 K/uL  RBC Count : 2.33 M/uL  Hemoglobin : 8.4 g/dL  Hematocrit : 25.5 %  Platelet Count - Automated : 98 K/uL  Mean Cell Volume : 109.4 fl  Mean Cell Hemoglobin : 36.1 pg  Mean Cell Hemoglobin Concentration : 32.9 gm/dL  Auto Neutrophil # : x  Auto Lymphocyte # : x  Auto Monocyte # : x  Auto Eosinophil # : x  Auto Basophil # : x  Auto Neutrophil % : x  Auto Lymphocyte % : x  Auto Monocyte % : x  Auto Eosinophil % : x  Auto Basophil % : x    10-30    142  |  105  |  8   ----------------------------<  140<H>  4.0   |  27  |  0.71    Ca    9.8      30 Oct 2019 06:50  Phos  3.5     10-30  Mg     1.8     10-30    TPro  6.3  /  Alb  3.5  /  TBili  0.5  /  DBili  x   /  AST  18  /  ALT  18  /  AlkPhos  75  10-28          CULTURES:    RADIOLOGY & ADDITIONAL STUDIES:

## 2019-10-29 NOTE — PROGRESS NOTE ADULT - SUBJECTIVE AND OBJECTIVE BOX
Interval Events: Reviewed  Patient seen and examined at bedside.    Patient is a 49y old  Female who presents with a chief complaint of Wound drainage/dehiscence (29 Oct 2019 14:06)  she is doing well and not in pain    PAST MEDICAL & SURGICAL HISTORY:  Brain mass  Fibroids  H/O hysterectomy with unilateral oophorectomy      MEDICATIONS:  Pulmonary:    Antimicrobials:  cefepime   IVPB 2000 milliGRAM(s) IV Intermittent every 8 hours  vancomycin  IVPB 1000 milliGRAM(s) IV Intermittent every 12 hours    Anticoagulants:  eltrombopag 50 milliGRAM(s) Oral daily    Cardiac:      Allergies    penicillins (Unknown)  sulfa drugs (Unknown)    Intolerances        Vital Signs Last 24 Hrs  T(C): 36.2 (29 Oct 2019 20:38), Max: 36.9 (29 Oct 2019 13:36)  T(F): 97.2 (29 Oct 2019 20:38), Max: 98.5 (29 Oct 2019 13:36)  HR: 69 (29 Oct 2019 20:38) (53 - 70)  BP: 106/73 (29 Oct 2019 20:38) (11/68 - 113/70)  BP(mean): 88 (28 Oct 2019 23:00) (68 - 88)  RR: 17 (29 Oct 2019 20:38) (14 - 18)  SpO2: 98% (29 Oct 2019 20:38) (95% - 100%)    10-28 @ 07:01  -  10-29 @ 07:00  --------------------------------------------------------  IN: 2765 mL / OUT: 1120 mL / NET: 1645 mL    10-29 @ 07:01  -  10-29 @ 21:52  --------------------------------------------------------  IN: 600 mL / OUT: 440 mL / NET: 160 mL          Review of Systems:   •	General: negative  •	Skin/Breast: negative  •	Ophthalmologic: negative  •	ENMT: negative  •	Respiratory and Thorax: negative  •	Cardiovascular: negative  •	Gastrointestinal: negative  •	Genitourinary: negative  •	Musculoskeletal: negative  •	Neurological: negative  •	Psychiatric: negative  •	Hematology/Lymphatics: negative  •	Endocrine: negative  •	Allergic/Immunologic: negative    Physical Exam:   • Constitutional:	Well-developed, well nourished  • Eyes:	EOMI; PERRL; no drainage or redness  • ENMT:	No oral lesions; no gross abnormalities  • Neck	No bruits; no thyromegaly or nodules  • Breasts:	not examined  • Back:	No deformity or limitation of movement  • Respiratory:	Breath Sounds equal & clear to percussion & auscultation, no accessory muscle use  • Cardiovascular:	Regular rate & rhythm, normal S1, S2; no murmurs, gallops or rubs; no S3, S4  • Gastrointestinal:	Soft, non-tender, no hepatosplenomegaly, normal bowel sounds  • Genitourinary:	not examined  • Rectal: not examined  • Extremities:	No cyanosis, clubbing or edema  • Vascular:	Equal and normal pulses (carotid, femoral, dorsalis pedis)  • Neurologica:l	not examined  • Skin:	No lesions; no rash  • Lymph Nodes:	No lymphadedenopathy  • Musculoskeletal:	No joint pain, swelling or deformity; no limitation of movement        LABS:      CBC Full  -  ( 29 Oct 2019 06:50 )  WBC Count : 8.31 K/uL  RBC Count : 2.58 M/uL  Hemoglobin : 9.2 g/dL  Hematocrit : 27.8 %  Platelet Count - Automated : 128 K/uL  Mean Cell Volume : 107.8 fl  Mean Cell Hemoglobin : 35.7 pg  Mean Cell Hemoglobin Concentration : 33.1 gm/dL  Auto Neutrophil # : x  Auto Lymphocyte # : x  Auto Monocyte # : x  Auto Eosinophil # : x  Auto Basophil # : x  Auto Neutrophil % : x  Auto Lymphocyte % : x  Auto Monocyte % : x  Auto Eosinophil % : x  Auto Basophil % : x    10-29    140  |  104  |  11  ----------------------------<  175<H>  4.5   |  26  |  0.62    Ca    9.7      29 Oct 2019 06:50  Phos  3.8     10-29  Mg     1.8     10-29    TPro  6.3  /  Alb  3.5  /  TBili  0.5  /  DBili  x   /  AST  18  /  ALT  18  /  AlkPhos  75  10-28                    Culture Results:   Testing in progress (10-29 @ 01:40)  Culture Results:   Testing in progress (10-29 @ 01:23)  Culture Results:   Testing in progress (10-29 @ 01:23)  Culture Results:   Testing in progress (10-29 @ 01:22)  Culture Results:   No growth to date (10-28 @ 21:12)  Culture Results:   No growth to date (10-28 @ 21:12)  Culture Results:   No growth to date (10-28 @ 21:12)  Culture Results:   Culture in progress (10-28 @ 21:06)      RADIOLOGY & ADDITIONAL STUDIES (The following images were personally reviewed):  Elliott:                                     No  Urine output:                       adequate  DVT prophylaxis:                 Yes  Flattus:                                  Yes  Bowel movement:              No

## 2019-10-29 NOTE — PROGRESS NOTE ADULT - SUBJECTIVE AND OBJECTIVE BOX
Pt seen and examined.   Comfortable. No acute events.     Exam:  AVSS. NAD.   Suture line c/d/i. No drainage or fluctuance.   ELIZABETH drain 50 in last shift - serosanguinous.

## 2019-10-29 NOTE — PROGRESS NOTE ADULT - ASSESSMENT
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 Garcia 2/2019, s/p RTT/Chemo in [place] 5/2019, presents to ED [facility] with c/o wound drainage since 10/19/2019.  S/p OR 10/28 for exploration and washout of craniotomy wound with plastics closure, s/p vanc/ cefepime for post-op ppx.     Recommendations:    - continue w/ OR ppx vanc/ cefepime  - F/u OR culture     ID team 1 to follow. Plan discussed with Dr. Calixto. 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 Garcia 2/2019, s/p RTT/Chemo in [place] 5/2019, presents to ED [facility] with c/o wound drainage since 10/19/2019.  S/p OR 10/28 for exploration and washout of craniotomy wound with plastics closure, s/p vanc/ cefepime for post-op ppx.     Recommendations:    - continue w/ vanc/ cefepime  - F/u OR culture     ID team 1 to follow. Plan discussed with Dr. Calixto.

## 2019-10-29 NOTE — PROGRESS NOTE ADULT - ASSESSMENT
A/P: Pt doing well POD#1 s/p cranioplasty revision.   1. Drain as per Neurosurgery  2. Sutures to remain in place for 4 weeks  3. f/u OR cultures  4. No showers  5. Dispo per Neurosurgery

## 2019-10-29 NOTE — PROGRESS NOTE ADULT - SUBJECTIVE AND OBJECTIVE BOX
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 Garcia 2/2019, s/p RTT/Chemo in [place] 5/2019, presents to ED [facility] with c/o wound drainage since 10/19/2019.  Pt denies sob, cp, chills/fevers, photophobia, neck pain, acute visual changes, acute numbness or weakness. (23 Oct 2019 23:36)        Hospital course:   10/23: presents to ED [facility] with c/o wound drainage since 10/19/2019.   10/24: WILMA overnight.   10/25: WILMA ovenright, pre op for washout revision this am- cancelled  10/26: WILMA overnight. Cherise following for pancytopenia. ID consulted. F/u OR plan for wound revision   10/27:  10/28: s/p exploration and washout of craniotomy wound with plastics closure, +ELIZABETH in place.   10/29: WILMA overnight. Pain controlled. ELIZABETH in place      Vital Signs Last 24 Hrs  T(C): 36.3 (29 Oct 2019 00:20), Max: 37.2 (28 Oct 2019 19:57)  T(F): 97.3 (29 Oct 2019 00:20), Max: 98.9 (28 Oct 2019 19:57)  HR: 70 (29 Oct 2019 00:20) (58 - 92)  BP: 113/65 (29 Oct 2019 00:20) (92/60 - 133/67)  BP(mean): 88 (28 Oct 2019 23:00) (67 - 88)  RR: 16 (29 Oct 2019 00:20) (12 - 20)  SpO2: 100% (29 Oct 2019 00:20) (95% - 100%)    I&O's Summary    27 Oct 2019 07:01  -  28 Oct 2019 07:00  --------------------------------------------------------  IN: 600 mL / OUT: 1350 mL / NET: -750 mL    28 Oct 2019 07:01  -  29 Oct 2019 00:54  --------------------------------------------------------  IN: 1590 mL / OUT: 160 mL / NET: 1430 mL        PHYSICAL EXAM:  Neurological: AOx3, NAD, FC, speech coherent   CN II-XII: EOMI, PERRL, face symmetric, tongue midline   Motor: MAEx4 5/5 UE and LE B/L   SILT throughout  Incision/wound: crani incision clean, dry and intact; +head wrap in place  +ELIZABETH (SG) in place      TUBES/LINES:  [] Elliott  [] A-line  [] Lumbar Drain  [] Wound Drains  [] NGT   [] EVD   [] CVC  [] Other      DIET:  [] NPO  [x] Mechanical  [] Tube feeds    LABS:                        9.2    8.57  )-----------( 169      ( 28 Oct 2019 20:18 )             27.8     10-28    141  |  107  |  11  ----------------------------<  180<H>  3.7   |  23  |  0.60    Ca    9.3      28 Oct 2019 20:18  Phos  5.0     10-27  Mg     1.9     10-27    TPro  6.3  /  Alb  3.5  /  TBili  0.5  /  DBili  x   /  AST  18  /  ALT  18  /  AlkPhos  75  10-28            CAPILLARY BLOOD GLUCOSE      POCT Blood Glucose.: 199 mg/dL (28 Oct 2019 22:33)  POCT Blood Glucose.: 187 mg/dL (28 Oct 2019 20:38)  POCT Blood Glucose.: 73 mg/dL (28 Oct 2019 16:57)  POCT Blood Glucose.: 82 mg/dL (28 Oct 2019 11:55)  POCT Blood Glucose.: 86 mg/dL (28 Oct 2019 06:49)      Drug Levels: [] N/A    CSF Analysis: [] N/A      Allergies    penicillins (Unknown)  sulfa drugs (Unknown)    Intolerances        Home Medications:      MEDICATIONS:  MEDICATIONS  (STANDING):  cefepime   IVPB 2000 milliGRAM(s) IV Intermittent every 8 hours  cefepime   IVPB 2000 milliGRAM(s) IV Intermittent every 8 hours  dextrose 5%. 1000 milliLiter(s) (50 mL/Hr) IV Continuous <Continuous>  dextrose 50% Injectable 12.5 Gram(s) IV Push once  eltrombopag 50 milliGRAM(s) Oral daily  filgrastim-sndz Injectable 300 MICROGram(s) SubCutaneous <User Schedule>  influenza   Vaccine 0.5 milliLiter(s) IntraMuscular once  insulin lispro (HumaLOG) corrective regimen sliding scale   SubCutaneous Before meals and at bedtime  senna 1 Tablet(s) Oral at bedtime  sodium chloride 0.9%. 1000 milliLiter(s) (75 mL/Hr) IV Continuous <Continuous>  vancomycin  IVPB 1000 milliGRAM(s) IV Intermittent every 12 hours    MEDICATIONS  (PRN):  acetaminophen   Tablet .. 650 milliGRAM(s) Oral every 6 hours PRN Temp greater or equal to 38C (100.4F), Mild Pain (1 - 3)  dextrose 40% Gel 15 Gram(s) Oral once PRN Blood Glucose LESS THAN 70 milliGRAM(s)/deciliter  glucagon  Injectable 1 milliGRAM(s) IntraMuscular once PRN Glucose LESS THAN 70 milligrams/deciliter  HYDROmorphone  Injectable 0.5 milliGRAM(s) IV Push every 4 hours PRN Severe Pain (7 - 10)  ondansetron Injectable 4 milliGRAM(s) IV Push every 6 hours PRN Nausea and/or Vomiting  oxycodone    5 mG/acetaminophen 325 mG 1 Tablet(s) Oral every 6 hours PRN Moderate Pain (4 - 6)      CULTURES:  Culture Results:   Rare Staphylococcus capitis (10-24 @ 08:34)  Culture Results:   Specimen appears CONTAMINATED. Lab suggests repeat clean catch specimen. (10-24 @ 08:30)      RADIOLOGY & ADDITIONAL TESTS:      ASSESSMENT:  49y Female s/p exploration and washout of craniotomy wound with plastics closure, +ELIZABETH in place, POD#1      PLAN:  -neuro/vital checks  -pain control prn  -+ELIZABETH(SG) in place- monitor output  -f/u OR cultures- on vanco/cefepime for post op ppx- f/u ID recs  -CTH today  -trend platelets- Cherise following   -continue neurpogen x 5 doses  -regular diet  -bowel regimen  -OOB/PT/OT  -d/w Dr. Fitzpatrick and Dr. Garcia       Assessment: present when checked     [] GCS   E   V   M     Heart Failure: [] Acute, [] acute on chronic, [] chronic   Heart Failure: [] Diastolic (HFpEF), [] Systolic (HRrEF), [] Combined (HFpEF and HFrEF), [] RHF, [] Pulm HTN, [] Other     [] IRMA, [] ATN, [] AIN, [] other   [] CKD1, [] CKD2, [] CKD3, [] CKD4, [] CKD5, [] ESRD     Encephalopathy: [] Metabolic, [] Hepatic, [] Toxic, [] Neurological, [] Other     Abnormal Nutritional Status: [] malnutrition (see nutrition note), []underweight: BMI <19, [] morbid obesity: BMI >40, [] Cachexia     [] Sepsis   [] Hypovolemic shock, [] Cardiogenic shock, [] Hemorrhagic shock, [] Neurogenic shock   [] Acute respiratory failure   [] Cerebral edema, [] Brain compression / herniation   [] Functional quadriplegia   [] Acute blood loss anemia HPI:  Pt is 48yo female, R handed, known to Neurosurgery  s/p Right craniotomy Stereotactic 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 Garcia 2/2019, s/p RTT/Chemo in [place] 5/2019, presents to ED [facility] with c/o wound drainage since 10/19/2019.  Pt denies sob, cp, chills/fevers, photophobia, neck pain, acute visual changes, acute numbness or weakness. (23 Oct 2019 23:36)        Hospital course:   10/23: presents to ED [facility] with c/o wound drainage since 10/19/2019.   10/24: WILMA overnight.   10/25: WILMA ovenright, pre op for washout revision this am- cancelled  10/26: WILMA overnight. Cherise following for pancytopenia. ID consulted. F/u OR plan for wound revision   10/27:  10/28: s/p exploration and washout of craniotomy wound with plastics closure, +ELIZABETH in place.   10/29: WILMA overnight. Pain controlled. ELIZABETH in place      Vital Signs Last 24 Hrs  T(C): 36.3 (29 Oct 2019 00:20), Max: 37.2 (28 Oct 2019 19:57)  T(F): 97.3 (29 Oct 2019 00:20), Max: 98.9 (28 Oct 2019 19:57)  HR: 70 (29 Oct 2019 00:20) (58 - 92)  BP: 113/65 (29 Oct 2019 00:20) (92/60 - 133/67)  BP(mean): 88 (28 Oct 2019 23:00) (67 - 88)  RR: 16 (29 Oct 2019 00:20) (12 - 20)  SpO2: 100% (29 Oct 2019 00:20) (95% - 100%)    I&O's Summary    27 Oct 2019 07:01  -  28 Oct 2019 07:00  --------------------------------------------------------  IN: 600 mL / OUT: 1350 mL / NET: -750 mL    28 Oct 2019 07:01  -  29 Oct 2019 00:54  --------------------------------------------------------  IN: 1590 mL / OUT: 160 mL / NET: 1430 mL        PHYSICAL EXAM:  Neurological: AOx3, NAD, FC, speech coherent   CN II-XII: EOMI, PERRL, face symmetric, tongue midline   Motor: MAEx4 5/5 UE and LE B/L   SILT throughout  Incision/wound: crani incision clean, dry and intact; +head wrap in place  +ELIZABETH (SG) in place      TUBES/LINES:  [] Elliott  [] A-line  [] Lumbar Drain  [] Wound Drains  [] NGT   [] EVD   [] CVC  [] Other      DIET:  [] NPO  [x] Mechanical  [] Tube feeds    LABS:                        9.2    8.57  )-----------( 169      ( 28 Oct 2019 20:18 )             27.8     10-28    141  |  107  |  11  ----------------------------<  180<H>  3.7   |  23  |  0.60    Ca    9.3      28 Oct 2019 20:18  Phos  5.0     10-27  Mg     1.9     10-27    TPro  6.3  /  Alb  3.5  /  TBili  0.5  /  DBili  x   /  AST  18  /  ALT  18  /  AlkPhos  75  10-28            CAPILLARY BLOOD GLUCOSE      POCT Blood Glucose.: 199 mg/dL (28 Oct 2019 22:33)  POCT Blood Glucose.: 187 mg/dL (28 Oct 2019 20:38)  POCT Blood Glucose.: 73 mg/dL (28 Oct 2019 16:57)  POCT Blood Glucose.: 82 mg/dL (28 Oct 2019 11:55)  POCT Blood Glucose.: 86 mg/dL (28 Oct 2019 06:49)      Drug Levels: [] N/A    CSF Analysis: [] N/A      Allergies    penicillins (Unknown)  sulfa drugs (Unknown)    Intolerances        Home Medications:      MEDICATIONS:  MEDICATIONS  (STANDING):  cefepime   IVPB 2000 milliGRAM(s) IV Intermittent every 8 hours  cefepime   IVPB 2000 milliGRAM(s) IV Intermittent every 8 hours  dextrose 5%. 1000 milliLiter(s) (50 mL/Hr) IV Continuous <Continuous>  dextrose 50% Injectable 12.5 Gram(s) IV Push once  eltrombopag 50 milliGRAM(s) Oral daily  filgrastim-sndz Injectable 300 MICROGram(s) SubCutaneous <User Schedule>  influenza   Vaccine 0.5 milliLiter(s) IntraMuscular once  insulin lispro (HumaLOG) corrective regimen sliding scale   SubCutaneous Before meals and at bedtime  senna 1 Tablet(s) Oral at bedtime  sodium chloride 0.9%. 1000 milliLiter(s) (75 mL/Hr) IV Continuous <Continuous>  vancomycin  IVPB 1000 milliGRAM(s) IV Intermittent every 12 hours    MEDICATIONS  (PRN):  acetaminophen   Tablet .. 650 milliGRAM(s) Oral every 6 hours PRN Temp greater or equal to 38C (100.4F), Mild Pain (1 - 3)  dextrose 40% Gel 15 Gram(s) Oral once PRN Blood Glucose LESS THAN 70 milliGRAM(s)/deciliter  glucagon  Injectable 1 milliGRAM(s) IntraMuscular once PRN Glucose LESS THAN 70 milligrams/deciliter  HYDROmorphone  Injectable 0.5 milliGRAM(s) IV Push every 4 hours PRN Severe Pain (7 - 10)  ondansetron Injectable 4 milliGRAM(s) IV Push every 6 hours PRN Nausea and/or Vomiting  oxycodone    5 mG/acetaminophen 325 mG 1 Tablet(s) Oral every 6 hours PRN Moderate Pain (4 - 6)      CULTURES:  Culture Results:   Rare Staphylococcus capitis (10-24 @ 08:34)  Culture Results:   Specimen appears CONTAMINATED. Lab suggests repeat clean catch specimen. (10-24 @ 08:30)      RADIOLOGY & ADDITIONAL TESTS:      ASSESSMENT:  49y Female s/p exploration and washout of craniotomy wound with plastics closure, +ELIZABETH in place, POD#1      Pancytopenia noted  s/p chemotherpay/RT tx in May 2019  Pantocytopenia due to chemiotherapy  Treated with Platelets infusion with good response   PLAN:  -neuro/vital checks  -pain control prn  -+ELIZABETH(SG) in place- monitor output  -f/u OR cultures- on vanco/cefepime for post op ppx- f/u ID recs  -CTH today  -trend platelets- Cherise following   -continue neurpogen x 5 doses  -regular diet  -bowel regimen  -OOB/PT/OT  -d/w Dr. Fitzpatrick and Dr. Garcia       Assessment: present when checked     [] GCS   E   V   M     Heart Failure: [] Acute, [] acute on chronic, [] chronic   Heart Failure: [] Diastolic (HFpEF), [] Systolic (HRrEF), [] Combined (HFpEF and HFrEF), [] RHF, [] Pulm HTN, [] Other     [] IRMA, [] ATN, [] AIN, [] other   [] CKD1, [] CKD2, [] CKD3, [] CKD4, [] CKD5, [] ESRD     Encephalopathy: [] Metabolic, [] Hepatic, [] Toxic, [] Neurological, [] Other     Abnormal Nutritional Status: [] malnutrition (see nutrition note), []underweight: BMI <19, [] morbid obesity: BMI >40, [] Cachexia     [] Sepsis   [] Hypovolemic shock, [] Cardiogenic shock, [] Hemorrhagic shock, [] Neurogenic shock   [] Acute respiratory failure   [] Cerebral edema, [] Brain compression / herniation   [] Functional quadriplegia   [] Acute blood loss anemia

## 2019-10-30 LAB
ANION GAP SERPL CALC-SCNC: 10 MMOL/L — SIGNIFICANT CHANGE UP (ref 5–17)
BUN SERPL-MCNC: 8 MG/DL — SIGNIFICANT CHANGE UP (ref 7–23)
CALCIUM SERPL-MCNC: 9.8 MG/DL — SIGNIFICANT CHANGE UP (ref 8.4–10.5)
CHLORIDE SERPL-SCNC: 105 MMOL/L — SIGNIFICANT CHANGE UP (ref 96–108)
CO2 SERPL-SCNC: 27 MMOL/L — SIGNIFICANT CHANGE UP (ref 22–31)
CREAT SERPL-MCNC: 0.71 MG/DL — SIGNIFICANT CHANGE UP (ref 0.5–1.3)
GLUCOSE BLDC GLUCOMTR-MCNC: 112 MG/DL — HIGH (ref 70–99)
GLUCOSE BLDC GLUCOMTR-MCNC: 114 MG/DL — HIGH (ref 70–99)
GLUCOSE BLDC GLUCOMTR-MCNC: 157 MG/DL — HIGH (ref 70–99)
GLUCOSE BLDC GLUCOMTR-MCNC: 92 MG/DL — SIGNIFICANT CHANGE UP (ref 70–99)
GLUCOSE SERPL-MCNC: 140 MG/DL — HIGH (ref 70–99)
HCT VFR BLD CALC: 25.5 % — LOW (ref 34.5–45)
HGB BLD-MCNC: 8.4 G/DL — LOW (ref 11.5–15.5)
MAGNESIUM SERPL-MCNC: 1.8 MG/DL — SIGNIFICANT CHANGE UP (ref 1.6–2.6)
MCHC RBC-ENTMCNC: 32.9 GM/DL — SIGNIFICANT CHANGE UP (ref 32–36)
MCHC RBC-ENTMCNC: 36.1 PG — HIGH (ref 27–34)
MCV RBC AUTO: 109.4 FL — HIGH (ref 80–100)
NRBC # BLD: 0 /100 WBCS — SIGNIFICANT CHANGE UP (ref 0–0)
PHOSPHATE SERPL-MCNC: 3.5 MG/DL — SIGNIFICANT CHANGE UP (ref 2.5–4.5)
PLATELET # BLD AUTO: 98 K/UL — LOW (ref 150–400)
POTASSIUM SERPL-MCNC: 4 MMOL/L — SIGNIFICANT CHANGE UP (ref 3.5–5.3)
POTASSIUM SERPL-SCNC: 4 MMOL/L — SIGNIFICANT CHANGE UP (ref 3.5–5.3)
RBC # BLD: 2.33 M/UL — LOW (ref 3.8–5.2)
RBC # FLD: 13.1 % — SIGNIFICANT CHANGE UP (ref 10.3–14.5)
SODIUM SERPL-SCNC: 142 MMOL/L — SIGNIFICANT CHANGE UP (ref 135–145)
VANCOMYCIN TROUGH SERPL-MCNC: 13.9 UG/ML — SIGNIFICANT CHANGE UP (ref 10–20)
WBC # BLD: 13.28 K/UL — HIGH (ref 3.8–10.5)
WBC # FLD AUTO: 13.28 K/UL — HIGH (ref 3.8–10.5)

## 2019-10-30 PROCEDURE — 99232 SBSQ HOSP IP/OBS MODERATE 35: CPT | Mod: GC

## 2019-10-30 RX ORDER — MAGNESIUM OXIDE 400 MG ORAL TABLET 241.3 MG
400 TABLET ORAL ONCE
Refills: 0 | Status: COMPLETED | OUTPATIENT
Start: 2019-10-30 | End: 2019-10-30

## 2019-10-30 RX ADMIN — SENNA PLUS 1 TABLET(S): 8.6 TABLET ORAL at 21:06

## 2019-10-30 RX ADMIN — CEFEPIME 100 MILLIGRAM(S): 1 INJECTION, POWDER, FOR SOLUTION INTRAMUSCULAR; INTRAVENOUS at 11:59

## 2019-10-30 RX ADMIN — MAGNESIUM OXIDE 400 MG ORAL TABLET 400 MILLIGRAM(S): 241.3 TABLET ORAL at 11:59

## 2019-10-30 RX ADMIN — CEFEPIME 100 MILLIGRAM(S): 1 INJECTION, POWDER, FOR SOLUTION INTRAMUSCULAR; INTRAVENOUS at 21:55

## 2019-10-30 RX ADMIN — OXYCODONE AND ACETAMINOPHEN 1 TABLET(S): 5; 325 TABLET ORAL at 22:15

## 2019-10-30 RX ADMIN — CEFEPIME 100 MILLIGRAM(S): 1 INJECTION, POWDER, FOR SOLUTION INTRAMUSCULAR; INTRAVENOUS at 03:16

## 2019-10-30 RX ADMIN — ELTROMBOPAG OLAMINE 50 MILLIGRAM(S): 50 TABLET, FILM COATED ORAL at 03:16

## 2019-10-30 RX ADMIN — Medication 250 MILLIGRAM(S): at 05:20

## 2019-10-30 RX ADMIN — OXYCODONE AND ACETAMINOPHEN 1 TABLET(S): 5; 325 TABLET ORAL at 21:06

## 2019-10-30 RX ADMIN — Medication 2: at 11:59

## 2019-10-30 RX ADMIN — Medication 250 MILLIGRAM(S): at 18:00

## 2019-10-30 RX ADMIN — HYDROMORPHONE HYDROCHLORIDE 0.5 MILLIGRAM(S): 2 INJECTION INTRAMUSCULAR; INTRAVENOUS; SUBCUTANEOUS at 02:00

## 2019-10-30 RX ADMIN — HYDROMORPHONE HYDROCHLORIDE 0.5 MILLIGRAM(S): 2 INJECTION INTRAMUSCULAR; INTRAVENOUS; SUBCUTANEOUS at 01:41

## 2019-10-30 RX ADMIN — HYDROMORPHONE HYDROCHLORIDE 0.5 MILLIGRAM(S): 2 INJECTION INTRAMUSCULAR; INTRAVENOUS; SUBCUTANEOUS at 09:39

## 2019-10-30 RX ADMIN — HYDROMORPHONE HYDROCHLORIDE 0.5 MILLIGRAM(S): 2 INJECTION INTRAMUSCULAR; INTRAVENOUS; SUBCUTANEOUS at 09:50

## 2019-10-30 NOTE — PROGRESS NOTE ADULT - ASSESSMENT
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 Garcia 2/2019, s/p RTT/Chemo in [place] 5/2019, presents to ED [facility] with c/o wound drainage since 10/19/2019.  S/p OR 10/28 for exploration and washout of craniotomy wound with plastics closure, s/p vanc/ cefepime for post-op ppx.     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 Garcia 2/2019, s/p RTT/Chemo in [place] 5/2019, presents to ED [facility] with c/o wound drainage since 10/19/2019.  S/p OR 10/28 for exploration and washout of craniotomy wound with plastics closure, s/p vanc/ cefepime for post-op ppx.    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 Garcia 2/2019, s/p RTT/Chemo in [place] 5/2019, presents to ED [facility] with c/o wound drainage since 10/19/2019.  S/p OR 10/28 for exploration and washout of craniotomy wound with plastics closure, s/p vanc/ cefepime for post-op ppx.    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 Garcia 2/2019, s/p RTT/Chemo in [place] 5/2019, presents to ED [facility] with c/o wound drainage since 10/19/2019.  S/p OR 10/28 for exploration and washout of craniotomy wound with plastics closure, s/p vanc/ cefepime for post-op ppx.    Recommendations:    - continue w/ vanc/ cefepime  - F/u OR culture (currently negative so far)   - duration of antibiotics depends on the depth of infection     ID Team 1 will follow.  Dr. Mcnally will take over the service tomorrow

## 2019-10-30 NOTE — PHYSICAL THERAPY INITIAL EVALUATION ADULT - GENERAL OBSERVATIONS, REHAB EVAL
Rec'd pt supine in bed, in no acute distress, +ELIZABETH x 1 cranial Right, +IV, cleared for PT and OOB activity by RN Zhanna and Neurosurgery JOYCE Hunt and Alec.

## 2019-10-30 NOTE — PHYSICAL THERAPY INITIAL EVALUATION ADULT - MODALITIES TREATMENT COMMENTS
tongue midline, smile symmetrical, eyes open/close and raise eyebrows intact bilat. H-test and quadrant test intact bilat.

## 2019-10-30 NOTE — PHYSICAL THERAPY INITIAL EVALUATION ADULT - ADDITIONAL COMMENTS
Pt lives at home alone with elevator access, denies JIGNA at home and in community. PTA: indep with functional mobility, no AD. +eyeglasses corrective. +right hand dominant.

## 2019-10-30 NOTE — PROGRESS NOTE ADULT - SUBJECTIVE AND OBJECTIVE BOX
infectious diseases progress note:  BRITTANY CARTER is a 49yFemale patient    WOUND DEHISCENCE    Postoperative state  Aplastic anemia  Pancytopenia  Preoperative clearance  Brain mass  Suspected deep vein thrombosis (DVT)  Wound dehiscence      ROS:  CONSTITUTIONAL:  Negative fever or chills, feels well, good appetite  EYES:  Negative  blurry vision or double vision  CARDIOVASCULAR:  Negative for chest pain or palpitations  RESPIRATORY:  Negative for cough, wheezing, or SOB   GASTROINTESTINAL:  Negative for nausea, vomiting, diarrhea, constipation, or abdominal pain  GENITOURINARY:  Negative frequency, urgency or dysuria  NEUROLOGIC:  No headache, confusion, dizziness, lightheadedness    Allergies    penicillins (Unknown)  sulfa drugs (Unknown)    Intolerances        ANTIBIOTICS/RELEVANT:  antimicrobials  cefepime   IVPB 2000 milliGRAM(s) IV Intermittent every 8 hours  vancomycin  IVPB 1000 milliGRAM(s) IV Intermittent every 12 hours    immunologic:  filgrastim-sndz Injectable 300 MICROGram(s) SubCutaneous <User Schedule>  influenza   Vaccine 0.5 milliLiter(s) IntraMuscular once    OTHER:  acetaminophen   Tablet .. 650 milliGRAM(s) Oral every 6 hours PRN  dextrose 40% Gel 15 Gram(s) Oral once PRN  dextrose 5%. 1000 milliLiter(s) IV Continuous <Continuous>  dextrose 50% Injectable 12.5 Gram(s) IV Push once  glucagon  Injectable 1 milliGRAM(s) IntraMuscular once PRN  HYDROmorphone  Injectable 0.5 milliGRAM(s) IV Push every 4 hours PRN  insulin lispro (HumaLOG) corrective regimen sliding scale   SubCutaneous Before meals and at bedtime  magnesium oxide 400 milliGRAM(s) Oral once  ondansetron Injectable 4 milliGRAM(s) IV Push every 6 hours PRN  oxycodone    5 mG/acetaminophen 325 mG 1 Tablet(s) Oral every 6 hours PRN  senna 1 Tablet(s) Oral at bedtime      Objective:  Vital Signs Last 24 Hrs  T(C): 36.9 (30 Oct 2019 08:30), Max: 36.9 (29 Oct 2019 13:36)  T(F): 98.4 (30 Oct 2019 08:30), Max: 98.5 (29 Oct 2019 13:36)  HR: 62 (30 Oct 2019 08:30) (53 - 73)  BP: 93/60 (30 Oct 2019 08:30) (93/60 - 110/72)  BP(mean): --  RR: 16 (30 Oct 2019 08:30) (15 - 17)  SpO2: 99% (30 Oct 2019 08:30) (95% - 100%)    PHYSICAL EXAM:  Constitutional:Well-developed, well nourishe  Eyes:JOSSY, EOMI  Head: dressings on head c/d/i   Ear/Nose/Throat: no oral lesion, no sinus tenderness on percussion	  Neck:no JVD, no lymphadenopathy, supple  Respiratory: CTA layla  Cardiovascular: S1S2 RRR, no murmurs  Gastrointestinal:soft, (+) BS, no HSM  Extremities:no e/e/c        LABS:                        8.4    13.28 )-----------( 98       ( 30 Oct 2019 06:50 )             25.5     10-30    142  |  105  |  8   ----------------------------<  140<H>  4.0   |  27  |  0.71    Ca    9.8      30 Oct 2019 06:50  Phos  3.5     10-30  Mg     1.8     10-30    TPro  6.3  /  Alb  3.5  /  TBili  0.5  /  DBili  x   /  AST  18  /  ALT  18  /  AlkPhos  75  10-28            MICROBIOLOGY:        RADIOLOGY & ADDITIONAL STUDIES: normal...

## 2019-10-30 NOTE — PROGRESS NOTE ADULT - SUBJECTIVE AND OBJECTIVE BOX
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 Garcia 2/2019, s/p RTT/Chemo in [place] 5/2019, presents to ED [facility] with c/o wound drainage since 10/19/2019.  Pt denies sob, cp, chills/fevers, photophobia, neck pain, acute visual changes, acute numbness or weakness. (23 Oct 2019 23:36)    Hospital course:   10/23: presents to ED [facility] with c/o wound drainage since 10/19/2019.   10/24: WILMA overnight.   10/25: WILMA ovenright, pre op for washout revision this am- cancelled  10/26: WILMA overnight. Cherise following for pancytopenia. ID consulted. F/u OR plan for wound revision   10/27:  10/28: s/p exploration and washout of craniotomy wound with plastics closure, +ELIZABETH in place.   10/29: WILMA overnight. Pain controlled. ELIZABETH in place  10/30: WILMA overnight, neuro stable. ELIZABETH drain with high output overnight. Cont cefepime, Vanc. F/u OR cultures   Vital Signs Last 24 Hrs  T(C): 36.9 (30 Oct 2019 08:30), Max: 36.9 (29 Oct 2019 13:36)  T(F): 98.4 (30 Oct 2019 08:30), Max: 98.5 (29 Oct 2019 13:36)  HR: 62 (30 Oct 2019 08:30) (53 - 73)  BP: 93/60 (30 Oct 2019 08:30) (93/60 - 110/72)  BP(mean): --  RR: 16 (30 Oct 2019 08:30) (15 - 17)  SpO2: 99% (30 Oct 2019 08:30) (95% - 100%)    I&O's Summary    29 Oct 2019 07:01  -  30 Oct 2019 07:00  --------------------------------------------------------  IN: 600 mL / OUT: 1130 mL / NET: -530 mL    30 Oct 2019 07:01  -  30 Oct 2019 10:50  --------------------------------------------------------  IN: 350 mL / OUT: 650 mL / NET: -300 mL        PHYSICAL EXAM:  Gen: laying in hospital bed, NAD  Neurological: AA+Ox3, OE spont, FC  CN II-XII: PERRL, EOMI  Motor exam: MAEx4, 5/5 strength throughout   SILT in UE and LE b/l  Cardiovascular: regular rate and rhythm  Respiratory: clear to auscultation   Incision/Wound: crani site C/D/I    TUBES/LINES:  [] Elliott  [] Lumbar Drain  [x] Wound Drains: ELIZABETH drain in place  [] Others      DIET:  [] NPO  [x] Mechanical  [] Tube feeds    LABS:                        8.4    13.28 )-----------( 98       ( 30 Oct 2019 06:50 )             25.5     10-30    142  |  105  |  8   ----------------------------<  140<H>  4.0   |  27  |  0.71    Ca    9.8      30 Oct 2019 06:50  Phos  3.5     10-30  Mg     1.8     10-30    TPro  6.3  /  Alb  3.5  /  TBili  0.5  /  DBili  x   /  AST  18  /  ALT  18  /  AlkPhos  75  10-28            CAPILLARY BLOOD GLUCOSE      POCT Blood Glucose.: 92 mg/dL (30 Oct 2019 07:51)  POCT Blood Glucose.: 139 mg/dL (29 Oct 2019 21:20)  POCT Blood Glucose.: 108 mg/dL (29 Oct 2019 16:55)  POCT Blood Glucose.: 85 mg/dL (29 Oct 2019 12:25)      Drug Levels: [] N/A    CSF Analysis: [] N/A      Allergies    penicillins (Unknown)  sulfa drugs (Unknown)    Intolerances      MEDICATIONS:  Antibiotics:  cefepime   IVPB 2000 milliGRAM(s) IV Intermittent every 8 hours  vancomycin  IVPB 1000 milliGRAM(s) IV Intermittent every 12 hours    Neuro:  acetaminophen   Tablet .. 650 milliGRAM(s) Oral every 6 hours PRN  HYDROmorphone  Injectable 0.5 milliGRAM(s) IV Push every 4 hours PRN  ondansetron Injectable 4 milliGRAM(s) IV Push every 6 hours PRN  oxycodone    5 mG/acetaminophen 325 mG 1 Tablet(s) Oral every 6 hours PRN    Anticoagulation:    OTHER:  dextrose 40% Gel 15 Gram(s) Oral once PRN  dextrose 50% Injectable 12.5 Gram(s) IV Push once  filgrastim-sndz Injectable 300 MICROGram(s) SubCutaneous <User Schedule>  glucagon  Injectable 1 milliGRAM(s) IntraMuscular once PRN  influenza   Vaccine 0.5 milliLiter(s) IntraMuscular once  insulin lispro (HumaLOG) corrective regimen sliding scale   SubCutaneous Before meals and at bedtime  senna 1 Tablet(s) Oral at bedtime    IVF:  dextrose 5%. 1000 milliLiter(s) IV Continuous <Continuous>  magnesium oxide 400 milliGRAM(s) Oral once    CULTURES:  Culture Results:   Testing in progress (10-29 @ 01:40)  Culture Results:   Testing in progress (10-29 @ 01:23)    RADIOLOGY & ADDITIONAL TESTS:      ASSESSMENT:  49y Female s/p exploration and washout of craniotomy wound with plastics closure, +ELIZABETH in place, POD#1    PLAN:  - neuro checks  - vitals checks  - pain control  - ELIZABETH drain in place, monitor output  - Monitor platelets, Dr. Luong following recs appreciated  - regular diet  - bowel regimen  - f/u OR cultures  - cont Vanco/Cefepime for post op ppx, ID following recs appreciated   - DVT PROPHYLAXIS: [x] Venodynes [x] Heparin/Lovenox  - PT/OT/OOB    DISPOSITION: regional, full code, dispo pending    D/w Dr. Fitzpatrick    Assessment:  Present when checked    []  GCS  E   V  M     Heart Failure: []Acute, [] acute on chronic , []chronic  Heart Failure:  [] Diastolic (HFpEF), [] Systolic (HFrEF), []Combined (HFpEF and HFrEF), [] RHF, [] Pulm HTN, [] Other    [] IRMA, [] ATN, [] AIN, [] other  [] CKD1, [] CKD2, [] CKD 3, [] CKD 4, [] CKD 5, []ESRD    Encephalopathy: [] Metabolic, [] Hepatic, [] toxic, [] Neurological, [] Other    Abnormal Nurtitional Status: [] malnurtition (see nutrition note), [ ]underweight: BMI < 19, [] morbid obesity: BMI >40, [] Cachexia    [] Sepsis  [] hypovolemic shock,[] cardiogenic shock, [] hemorrhagic shock, [] neuogenic shock  [] Acute Respiratory Failure  []Cerebral edema, [] Brain compression/ herniation,   [] Functional quadriplegia  [] Acute blood loss anemia

## 2019-10-30 NOTE — PHYSICAL THERAPY INITIAL EVALUATION ADULT - PERTINENT HX OF CURRENT PROBLEM, REHAB EVAL
as per chart Pt is 50yo female, R handed, known to Neurosurgery  s/p Right craniotomy Stereotactic 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 Garcia 2/2019, s/p RTT/Chemo in [place] 5/2019, presents to ED [facility] with c/o wound drainage since 10/19/2019.

## 2019-10-31 LAB
-  CEFAZOLIN: SIGNIFICANT CHANGE UP
-  CEFAZOLIN: SIGNIFICANT CHANGE UP
-  CLINDAMYCIN: SIGNIFICANT CHANGE UP
-  CLINDAMYCIN: SIGNIFICANT CHANGE UP
-  ERYTHROMYCIN: SIGNIFICANT CHANGE UP
-  ERYTHROMYCIN: SIGNIFICANT CHANGE UP
-  LINEZOLID: SIGNIFICANT CHANGE UP
-  LINEZOLID: SIGNIFICANT CHANGE UP
-  OXACILLIN: SIGNIFICANT CHANGE UP
-  OXACILLIN: SIGNIFICANT CHANGE UP
-  PENICILLIN: SIGNIFICANT CHANGE UP
-  PENICILLIN: SIGNIFICANT CHANGE UP
-  RIFAMPIN: SIGNIFICANT CHANGE UP
-  RIFAMPIN: SIGNIFICANT CHANGE UP
-  TRIMETHOPRIM/SULFAMETHOXAZOLE: SIGNIFICANT CHANGE UP
-  TRIMETHOPRIM/SULFAMETHOXAZOLE: SIGNIFICANT CHANGE UP
-  VANCOMYCIN: SIGNIFICANT CHANGE UP
-  VANCOMYCIN: SIGNIFICANT CHANGE UP
ANION GAP SERPL CALC-SCNC: 8 MMOL/L — SIGNIFICANT CHANGE UP (ref 5–17)
BUN SERPL-MCNC: 9 MG/DL — SIGNIFICANT CHANGE UP (ref 7–23)
CALCIUM SERPL-MCNC: 9.7 MG/DL — SIGNIFICANT CHANGE UP (ref 8.4–10.5)
CHLORIDE SERPL-SCNC: 107 MMOL/L — SIGNIFICANT CHANGE UP (ref 96–108)
CO2 SERPL-SCNC: 27 MMOL/L — SIGNIFICANT CHANGE UP (ref 22–31)
CREAT SERPL-MCNC: 0.68 MG/DL — SIGNIFICANT CHANGE UP (ref 0.5–1.3)
CULTURE RESULTS: SIGNIFICANT CHANGE UP
CULTURE RESULTS: SIGNIFICANT CHANGE UP
GLUCOSE SERPL-MCNC: 109 MG/DL — HIGH (ref 70–99)
HCT VFR BLD CALC: 25.4 % — LOW (ref 34.5–45)
HGB BLD-MCNC: 8.4 G/DL — LOW (ref 11.5–15.5)
MCHC RBC-ENTMCNC: 33.1 GM/DL — SIGNIFICANT CHANGE UP (ref 32–36)
MCHC RBC-ENTMCNC: 35.4 PG — HIGH (ref 27–34)
MCV RBC AUTO: 107.2 FL — HIGH (ref 80–100)
METHOD TYPE: SIGNIFICANT CHANGE UP
METHOD TYPE: SIGNIFICANT CHANGE UP
NRBC # BLD: 0 /100 WBCS — SIGNIFICANT CHANGE UP (ref 0–0)
ORGANISM # SPEC MICROSCOPIC CNT: SIGNIFICANT CHANGE UP
PLATELET # BLD AUTO: 87 K/UL — LOW (ref 150–400)
POTASSIUM SERPL-MCNC: 4.2 MMOL/L — SIGNIFICANT CHANGE UP (ref 3.5–5.3)
POTASSIUM SERPL-SCNC: 4.2 MMOL/L — SIGNIFICANT CHANGE UP (ref 3.5–5.3)
RBC # BLD: 2.37 M/UL — LOW (ref 3.8–5.2)
RBC # FLD: 13 % — SIGNIFICANT CHANGE UP (ref 10.3–14.5)
SODIUM SERPL-SCNC: 142 MMOL/L — SIGNIFICANT CHANGE UP (ref 135–145)
SPECIMEN SOURCE: SIGNIFICANT CHANGE UP
SPECIMEN SOURCE: SIGNIFICANT CHANGE UP
WBC # BLD: 4.77 K/UL — SIGNIFICANT CHANGE UP (ref 3.8–10.5)
WBC # FLD AUTO: 4.77 K/UL — SIGNIFICANT CHANGE UP (ref 3.8–10.5)

## 2019-10-31 PROCEDURE — 99232 SBSQ HOSP IP/OBS MODERATE 35: CPT

## 2019-10-31 RX ORDER — BACITRACIN ZINC 500 UNIT/G
1 OINTMENT IN PACKET (EA) TOPICAL
Refills: 0 | Status: DISCONTINUED | OUTPATIENT
Start: 2019-10-31 | End: 2019-11-04

## 2019-10-31 RX ORDER — VANCOMYCIN HCL 1 G
1250 VIAL (EA) INTRAVENOUS EVERY 12 HOURS
Refills: 0 | Status: DISCONTINUED | OUTPATIENT
Start: 2019-11-01 | End: 2019-11-04

## 2019-10-31 RX ORDER — CEFEPIME 1 G/1
2000 INJECTION, POWDER, FOR SOLUTION INTRAMUSCULAR; INTRAVENOUS EVERY 8 HOURS
Refills: 0 | Status: DISCONTINUED | OUTPATIENT
Start: 2019-10-31 | End: 2019-11-02

## 2019-10-31 RX ORDER — HYDROMORPHONE HYDROCHLORIDE 2 MG/ML
0.5 INJECTION INTRAMUSCULAR; INTRAVENOUS; SUBCUTANEOUS ONCE
Refills: 0 | Status: DISCONTINUED | OUTPATIENT
Start: 2019-10-31 | End: 2019-10-31

## 2019-10-31 RX ORDER — VANCOMYCIN HCL 1 G
VIAL (EA) INTRAVENOUS
Refills: 0 | Status: DISCONTINUED | OUTPATIENT
Start: 2019-10-31 | End: 2019-10-31

## 2019-10-31 RX ORDER — VANCOMYCIN HCL 1 G
1250 VIAL (EA) INTRAVENOUS EVERY 12 HOURS
Refills: 0 | Status: DISCONTINUED | OUTPATIENT
Start: 2019-10-31 | End: 2019-10-31

## 2019-10-31 RX ADMIN — HYDROMORPHONE HYDROCHLORIDE 0.5 MILLIGRAM(S): 2 INJECTION INTRAMUSCULAR; INTRAVENOUS; SUBCUTANEOUS at 13:15

## 2019-10-31 RX ADMIN — Medication 300 MICROGRAM(S): at 12:20

## 2019-10-31 RX ADMIN — Medication 166.67 MILLIGRAM(S): at 19:00

## 2019-10-31 RX ADMIN — CEFEPIME 100 MILLIGRAM(S): 1 INJECTION, POWDER, FOR SOLUTION INTRAMUSCULAR; INTRAVENOUS at 22:38

## 2019-10-31 RX ADMIN — CEFEPIME 100 MILLIGRAM(S): 1 INJECTION, POWDER, FOR SOLUTION INTRAMUSCULAR; INTRAVENOUS at 13:01

## 2019-10-31 RX ADMIN — Medication 166.67 MILLIGRAM(S): at 07:49

## 2019-10-31 RX ADMIN — HYDROMORPHONE HYDROCHLORIDE 0.5 MILLIGRAM(S): 2 INJECTION INTRAMUSCULAR; INTRAVENOUS; SUBCUTANEOUS at 13:01

## 2019-10-31 RX ADMIN — SENNA PLUS 1 TABLET(S): 8.6 TABLET ORAL at 22:39

## 2019-10-31 RX ADMIN — Medication 1 APPLICATION(S): at 10:00

## 2019-10-31 RX ADMIN — CEFEPIME 100 MILLIGRAM(S): 1 INJECTION, POWDER, FOR SOLUTION INTRAMUSCULAR; INTRAVENOUS at 05:10

## 2019-10-31 RX ADMIN — Medication 1 APPLICATION(S): at 22:39

## 2019-10-31 NOTE — PROGRESS NOTE ADULT - SUBJECTIVE AND OBJECTIVE BOX
HPI:  Pt is 50yo female, R handed, known to Neurosurgery  s/p Right craniotomy Stereotactic 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 Garcia 2/2019, s/p RTT/Chemo in [place] 5/2019, presents to ED [facility] with c/o wound drainage since 10/19/2019.  Pt denies sob, cp, chills/fevers, photophobia, neck pain, acute visual changes, acute numbness or weakness. (23 Oct 2019 23:36)    OVERNIGHT EVENTS: patient denies any significant pain. Tolerating OOB and PO diet. ELIZABETH drain on half suction, subgaleal. ELIZABETH output 27.5cc overnight.    Hospital course:   10/23: presents to ED [facility] with c/o wound drainage since 10/19/2019.   10/24: WILMA overnight.   10/25: WILMA ovenright, pre op for washout revision this am- cancelled  10/26: WILMA overnight. Cherise following for pancytopenia. ID consulted. F/u OR plan for wound revision   10/27:  10/28: s/p exploration and washout of craniotomy wound with plastics closure, +ELIZABETH in place.   10/29: WILMA overnight. Pain controlled. ELIZABETH in place  10/30: WILMA overnight, neuro stable. ELIZABETH drain with high output overnight. Cont cefepime, Vanc.   10/31: Cleared by PT for DC home. Continue ELIZABETH drain on half suction. ID following. ELIZABETH output 27.5cc overnight.      Vital Signs Last 24 Hrs  T(C): 36.7 (31 Oct 2019 05:32), Max: 37.1 (30 Oct 2019 20:45)  T(F): 98 (31 Oct 2019 05:32), Max: 98.7 (30 Oct 2019 20:45)  HR: 69 (31 Oct 2019 05:32) (61 - 73)  BP: 98/63 (31 Oct 2019 05:32) (93/60 - 104/67)  BP(mean): --  RR: 17 (31 Oct 2019 05:32) (15 - 17)  SpO2: 100% (31 Oct 2019 05:32) (99% - 100%)    I&O's Summary    29 Oct 2019 07:01  -  30 Oct 2019 07:00  --------------------------------------------------------  IN: 600 mL / OUT: 1130 mL / NET: -530 mL    30 Oct 2019 07:01  -  31 Oct 2019 06:12  --------------------------------------------------------  IN: 1340 mL / OUT: 2817.5 mL / NET: -1477.5 mL        PHYSICAL EXAM:  Gen: NAD, AAOx3  HEENT: PERRL. EOMI. Right frontal scalp incision w/ dressing C/D/I. +ELIZABETH drain.  Neck: FROM, nontender  Lungs: Clear b/l  Heart: S1, S2. NSR.  Abd: Soft, NT/ND. +BS  Exts: Pulses 2+ throughout  Neuro: CNs II-XII intact. 5/5 str x4 extremities. Sensation to LT intact. Following commands. Gait WNL. Speech clear.      TUBES/LINES:  [] Elliott  [] Lumbar Drain  [] Wound Drains  [] Others      DIET:  [] NPO  [] Mechanical  [] Tube feeds    LABS:                        8.4    13.28 )-----------( 98       ( 30 Oct 2019 06:50 )             25.5     10-30    142  |  105  |  8   ----------------------------<  140<H>  4.0   |  27  |  0.71    Ca    9.8      30 Oct 2019 06:50  Phos  3.5     10-30  Mg     1.8     10-30              CAPILLARY BLOOD GLUCOSE      POCT Blood Glucose.: 114 mg/dL (30 Oct 2019 21:46)  POCT Blood Glucose.: 112 mg/dL (30 Oct 2019 16:27)  POCT Blood Glucose.: 157 mg/dL (30 Oct 2019 11:18)  POCT Blood Glucose.: 92 mg/dL (30 Oct 2019 07:51)      Drug Levels: [] N/A  Vancomycin Level, Trough: 13.9 ug/mL (10-30 @ 17:09)    CSF Analysis: [] N/A      Allergies    penicillins (Unknown)  sulfa drugs (Unknown)    Intolerances      MEDICATIONS:  Antibiotics:  cefepime   IVPB 2000 milliGRAM(s) IV Intermittent every 8 hours    Neuro:  acetaminophen   Tablet .. 650 milliGRAM(s) Oral every 6 hours PRN  HYDROmorphone  Injectable 0.5 milliGRAM(s) IV Push every 4 hours PRN  ondansetron Injectable 4 milliGRAM(s) IV Push every 6 hours PRN  oxycodone    5 mG/acetaminophen 325 mG 1 Tablet(s) Oral every 6 hours PRN    Anticoagulation:    OTHER:  filgrastim-sndz Injectable 300 MICROGram(s) SubCutaneous <User Schedule>  influenza   Vaccine 0.5 milliLiter(s) IntraMuscular once  senna 1 Tablet(s) Oral at bedtime    IVF:    CULTURES:  Culture Results:   Testing in progress (10-29 @ 01:40)  Culture Results:   Testing in progress (10-29 @ 01:23)    RADIOLOGY & ADDITIONAL TESTS:      ASSESSMENT: 49 year old female with h/o 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    PLAN:  Continue to monitor ELIZABETH output,  Appreciate f/u from Dr. Anaya (Plastics)  Dr. Luong following for thrombocytopenia, stable,  Dr. Mcnally infectious disease, pending final OR cultures to determine abx regimen   -Staph capitis from surface culture on admission,  -Staph epidermidis from OR culture  -Continue Cefepime  Pain meds PRN,  OOB as tolerated, cleared by PT for discharge home,  SCDs, no chemoppx at this time, LE dopplers negative for DVT 10/24/19,  D/w Dr. Fitzpatrick      DISPOSITION: regional, full code, dispo pending    D/w Dr. Fitzpatrick    Assessment:  Present when checked    []  GCS  E   V  M     Heart Failure: []Acute, [] acute on chronic , []chronic  Heart Failure:  [] Diastolic (HFpEF), [] Systolic (HFrEF), []Combined (HFpEF and HFrEF), [] RHF, [] Pulm HTN, [] Other    [] IRMA, [] ATN, [] AIN, [] other  [] CKD1, [] CKD2, [] CKD 3, [] CKD 4, [] CKD 5, []ESRD    Encephalopathy: [] Metabolic, [] Hepatic, [] toxic, [] Neurological, [] Other    Abnormal Nurtitional Status: [] malnurtition (see nutrition note), [ ]underweight: BMI < 19, [] morbid obesity: BMI >40, [] Cachexia    [] Sepsis  [] hypovolemic shock,[] cardiogenic shock, [] hemorrhagic shock, [] neuogenic shock  [] Acute Respiratory Failure  []Cerebral edema, [] Brain compression/ herniation,   [] Functional quadriplegia  [] Acute blood loss anemia

## 2019-10-31 NOTE — PROGRESS NOTE ADULT - SUBJECTIVE AND OBJECTIVE BOX
HEALTH ISSUES - PROBLEM Dx:  Postoperative state: Postoperative state  Aplastic anemia: Aplastic anemia  Pancytopenia: Pancytopenia  Preoperative clearance: Preoperative clearance  Brain mass: Brain mass  Suspected deep vein thrombosis (DVT)  Wound dehiscence: Wound dehiscence          CHEMOTHERAPY REGIMEN:        Day:                          Diet:  Protocol:                                    IVF:      MEDICATIONS  (STANDING):  BACItracin   Ointment 1 Application(s) Topical two times a day  cefepime   IVPB 2000 milliGRAM(s) IV Intermittent every 8 hours  filgrastim-sndz Injectable 300 MICROGram(s) SubCutaneous <User Schedule>  influenza   Vaccine 0.5 milliLiter(s) IntraMuscular once  senna 1 Tablet(s) Oral at bedtime  vancomycin  IVPB 1250 milliGRAM(s) IV Intermittent every 12 hours    MEDICATIONS  (PRN):  acetaminophen   Tablet .. 650 milliGRAM(s) Oral every 6 hours PRN Temp greater or equal to 38C (100.4F), Mild Pain (1 - 3)  HYDROmorphone  Injectable 0.5 milliGRAM(s) IV Push every 4 hours PRN Severe Pain (7 - 10)  ondansetron Injectable 4 milliGRAM(s) IV Push every 6 hours PRN Nausea and/or Vomiting  oxycodone    5 mG/acetaminophen 325 mG 1 Tablet(s) Oral every 6 hours PRN Moderate Pain (4 - 6)      Allergies    penicillins (Unknown)  sulfa drugs (Unknown)    Intolerances        DVT Prophylaxis: [ ] YES [ ] NO      Antibiotics: [ ] YES [ ] NO    Pain Scale (1-10):       Location:    Vital Signs Last 24 Hrs  T(C): 36.6 (31 Oct 2019 16:25), Max: 37.2 (31 Oct 2019 12:55)  T(F): 97.9 (31 Oct 2019 16:25), Max: 99 (31 Oct 2019 12:55)  HR: 66 (31 Oct 2019 16:25) (61 - 74)  BP: 92/60 (31 Oct 2019 16:25) (92/60 - 109/69)  BP(mean): --  RR: 16 (31 Oct 2019 16:25) (16 - 18)  SpO2: 98% (31 Oct 2019 16:25) (97% - 100%)    Drug Dosing Weight  Height (cm): 175.3 (29 Oct 2019 00:20)  Weight (kg): 81.6 (28 Oct 2019 08:55)  BMI (kg/m2): 26.6 (29 Oct 2019 00:20)  BSA (m2): 1.98 (29 Oct 2019 00:20)     Physical Exam:  · Constitutional	detailed exam  · Constitutional Details	well-developed; well-groomed  · Eyes	EOMI; PERRL; no drainage or redness  · ENMT Comments	dry mucous membranes  · Respiratory	detailed exam  · Respiratory Comments	normal breath sounds at the lung bases bilaterally  · Cardiovascular	Regular rate & rhythm, normal S1, S2; no murmurs, gallops or rubs; no S3, S4  · Abd-Soft non tender  ·Ext-no edema, clubbing or cyanosis    URINARY CATHETER: [ ] YES [ ] NO     LABS:  CBC Full  -  ( 31 Oct 2019 06:26 )  WBC Count : 4.77 K/uL  RBC Count : 2.37 M/uL  Hemoglobin : 8.4 g/dL  Hematocrit : 25.4 %  Platelet Count - Automated : 87 K/uL  Mean Cell Volume : 107.2 fl  Mean Cell Hemoglobin : 35.4 pg  Mean Cell Hemoglobin Concentration : 33.1 gm/dL  Auto Neutrophil # : x  Auto Lymphocyte # : x  Auto Monocyte # : x  Auto Eosinophil # : x  Auto Basophil # : x  Auto Neutrophil % : x  Auto Lymphocyte % : x  Auto Monocyte % : x  Auto Eosinophil % : x  Auto Basophil % : x    10-31    142  |  107  |  9   ----------------------------<  109<H>  4.2   |  27  |  0.68    Ca    9.7      31 Oct 2019 06:26  Phos  3.5     10-30  Mg     1.8     10-30            CULTURES:    RADIOLOGY & ADDITIONAL STUDIES:

## 2019-10-31 NOTE — PROGRESS NOTE ADULT - SUBJECTIVE AND OBJECTIVE BOX
infectious diseases progress note:  BRITTANY CARTER is a 49yFemale patient    WOUND DEHISCENCE    Postoperative state  Aplastic anemia  Pancytopenia  Preoperative clearance  Brain mass  Suspected deep vein thrombosis (DVT)  Wound dehiscence      ROS:  CONSTITUTIONAL:  Negative fever or chills, feels well, good appetite  EYES:  Negative  blurry vision or double vision  CARDIOVASCULAR:  Negative for chest pain or palpitations  RESPIRATORY:  Negative for cough, wheezing, or SOB   GASTROINTESTINAL:  Negative for nausea, vomiting, diarrhea, constipation, or abdominal pain  GENITOURINARY:  Negative frequency, urgency or dysuria  NEUROLOGIC:  No headache, confusion, dizziness, lightheadedness    Allergies    penicillins (Unknown)  sulfa drugs (Unknown)    Intolerances        ANTIBIOTICS/RELEVANT:  antimicrobials  cefepime   IVPB 2000 milliGRAM(s) IV Intermittent every 8 hours  vancomycin  IVPB 1250 milliGRAM(s) IV Intermittent every 12 hours    immunologic:  filgrastim-sndz Injectable 300 MICROGram(s) SubCutaneous <User Schedule>  influenza   Vaccine 0.5 milliLiter(s) IntraMuscular once    OTHER:  acetaminophen   Tablet .. 650 milliGRAM(s) Oral every 6 hours PRN  BACItracin   Ointment 1 Application(s) Topical two times a day  HYDROmorphone  Injectable 0.5 milliGRAM(s) IV Push every 4 hours PRN  ondansetron Injectable 4 milliGRAM(s) IV Push every 6 hours PRN  oxycodone    5 mG/acetaminophen 325 mG 1 Tablet(s) Oral every 6 hours PRN  senna 1 Tablet(s) Oral at bedtime      Objective:  Vital Signs Last 24 Hrs  T(C): 37.2 (31 Oct 2019 12:55), Max: 37.2 (31 Oct 2019 12:55)  T(F): 99 (31 Oct 2019 12:55), Max: 99 (31 Oct 2019 12:55)  HR: 64 (31 Oct 2019 12:55) (61 - 73)  BP: 102/68 (31 Oct 2019 12:55) (95/61 - 104/67)  BP(mean): --  RR: 18 (31 Oct 2019 12:55) (15 - 18)  SpO2: 100% (31 Oct 2019 12:55) (98% - 100%)    PHYSICAL EXAM:  Constitutional:Well-developed, well nourishe  Eyes:JOSSY, EOMI  Head: dressings on head c/d/i   Ear/Nose/Throat: no oral lesion, no sinus tenderness on percussion	  Neck:no JVD, no lymphadenopathy, supple  Respiratory: CTA layla  Cardiovascular: S1S2 RRR, no murmurs  Gastrointestinal:soft, (+) BS, no HSM  Extremities:no e/e/c      LABS:                        8.4    4.77  )-----------( 87       ( 31 Oct 2019 06:26 )             25.4     10-31    142  |  107  |  9   ----------------------------<  109<H>  4.2   |  27  |  0.68    Ca    9.7      31 Oct 2019 06:26  Phos  3.5     10-30  Mg     1.8     10-30              MICROBIOLOGY:        RADIOLOGY & ADDITIONAL STUDIES:

## 2019-10-31 NOTE — PROGRESS NOTE ADULT - SUBJECTIVE AND OBJECTIVE BOX
Pt seen and examined.   Comfortable. No acute events.     Exam:  AVSS. NAD.   Suture line c/d/i. No drainage or fluctuance.   ELIZABETH drain 70/24 hours - serosanguinous.

## 2019-10-31 NOTE — PROGRESS NOTE ADULT - ASSESSMENT
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 Garcia 2/2019, s/p RTT/Chemo in [place] 5/2019, presents to ED [facility] with c/o wound drainage since 10/19/2019.  S/p OR 10/28 for exploration and washout of craniotomy wound with plastics closure, s/p vanc/ cefepime for post-op ppx.    Recommendations:    - continue w/ vanc/ cefepime  - F/u OR culture growing Staph Capitis and Epidermitis sensitive to oxacillin   - duration of antibiotics depends on the depth of infection     ID Team 1 will follow.  Dr. Mcnally will take over the service tomorrow 49 female, R handed, known to Neurosurgery  s/p Right craniotomy Stereotactic 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 Garcia 2/2019, s/p RTT/Chemo in [place] 5/2019, presents to ED [facility] with c/o wound drainage since 10/19/2019.  S/p OR 10/28 for exploration and washout of craniotomy wound with plastics closure, s/p vanc/ cefepime for post-op ppx.    Recommendations:    -  OR culture growing Staph Capitis and Epidermitis sensitive to oxacillin depth of infection to epidural as per neurosurgery  - d/c Vanc/Zosyn and start Oxacillin 2g Q6H for a total of 6 weeks from OR  - weekly CBC w/ diff, CMP, ESR and follow up with Dr. Mj Calixto as outpatient.       ID Team 1 will follow.  Dr. Mcnally will take over the service tomorrow 49 female, R handed, known to Neurosurgery  s/p Right craniotomy Stereotactic 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 Garcia 2/2019, s/p RTT/Chemo in [place] 5/2019, presents to ED [facility] with c/o wound drainage since 10/19/2019.  S/p OR 10/28 for exploration and washout of craniotomy wound with plastics closure, s/p vanc/ cefepime for post-op ppx.    Recommendations:    -  OR culture growing Staph Capitis and Epidermitis sensitive to oxacillin depth of infection to epidural as per neurosurgery  - d/c Vanc/Zosyn and start Oxacillin 2g Q6H for a total of 6 weeks from OR  - weekly CBC w/ diff, CMP, ESR and follow up with Dr. Mj Calixto as outpatient.       ID Team 1 will sign off.  Discussed with Dr. Mcnally. 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 Garcia 2/2019, s/p RTT/Chemo in [place] 5/2019, presents to ED [facility] with c/o wound drainage since 10/19/2019.  S/p OR 10/28 for exploration and washout of craniotomy wound with plastics closure, s/p vanc/ cefepime for post-op ppx.    Recommendations:    -  OR culture growing Staph Capitis and Epidermitis sensitive to oxacillin depth of infection to epidural as per neurosurgery  - d/c Vanc/Zosyn and start Oxacillin 2g Q6H for a total of 6 weeks from OR  - weekly CBC w/ diff, CMP, ESR and follow up with Dr. Mj Calixto as outpatient on Dec 18th at 10am at [address] office.       ID Team 1 will sign off.  Discussed with Dr. Mcnally.

## 2019-10-31 NOTE — CONSULT NOTE ADULT - REASON FOR ADMISSION
Wound drainage/dehiscence

## 2019-10-31 NOTE — PROGRESS NOTE ADULT - ASSESSMENT
A/P: Pt doing well POD#3 s/p cranioplasty revision.   1. Drain as per Neurosurgery  2. Sutures to remain in place for 4 weeks  3. f/u final OR cultures  4. No showers until seen in office  5. Dispo per Neurosurgery  6. f/u in Office 1 week after discharge [phone]

## 2019-10-31 NOTE — CONSULT NOTE ADULT - SUBJECTIVE AND OBJECTIVE BOX
HPI:  Pt is 50yo female, R handed, known to Neurosurgery  s/p Right craniotomy Stereotactic 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 Garcia 2019, s/p RTT/Chemo in [place] 2019, presents to ED [facility] with c/o wound drainage since 10/19/2019.  Pt denies sob, cp, chills/fevers, photophobia, neck pain, acute visual changes, acute numbness or weakness.      PAST MEDICAL & SURGICAL HISTORY:  Brain mass  Fibroids  H/O hysterectomy with unilateral oophorectomy        REVIEW OF SYSTEMS:    General:	 no weakness; no fevers, no chills  Skin/Breast: no rash  Respiratory and Thorax: no SOB, no cough  Cardiovascular:	No chest pain  Gastrointestinal:	 no nausea, vomiting , diarrhea  Genitourinary:	no dysuria, no difficulty urinating, no hematuria  Musculoskeletal:	no weakness, no joint swelling/pain  Neurological:	no focal weakness/numbness  Endocrine:	no polyuria, no polydipsia      ANTIBIOTICS:  MEDICATIONS  (STANDING):  dextrose 5%. 1000 milliLiter(s) (50 mL/Hr) IV Continuous <Continuous>  dextrose 50% Injectable 12.5 Gram(s) IV Push once  insulin lispro (HumaLOG) corrective regimen sliding scale   SubCutaneous Before meals and at bedtime  senna 1 Tablet(s) Oral at bedtime    MEDICATIONS  (PRN):  dextrose 40% Gel 15 Gram(s) Oral once PRN Blood Glucose LESS THAN 70 milliGRAM(s)/deciliter  glucagon  Injectable 1 milliGRAM(s) IntraMuscular once PRN Glucose LESS THAN 70 milligrams/deciliter  ondansetron Injectable 4 milliGRAM(s) IV Push every 6 hours PRN Nausea and/or Vomiting      Allergies    penicillins (Unknown)  sulfa drugs (Unknown)    Intolerances        SOCIAL HISTORY:    FAMILY HISTORY:  No pertinent family history in first degree relatives      Vital Signs Last 24 Hrs  T(C): 36.8 (25 Oct 2019 13:08), Max: 36.9 (24 Oct 2019 17:07)  T(F): 98.3 (25 Oct 2019 13:08), Max: 98.5 (24 Oct 2019 17:07)  HR: 92 (25 Oct 2019 15:55) (62 - 92)  BP: 98/61 (25 Oct 2019 15:55) (94/53 - 105/67)  BP(mean): 75 (25 Oct 2019 15:55) (68 - 81)  RR: 19 (25 Oct 2019 15:55) (16 - 20)  SpO2: 100% (25 Oct 2019 15:55) (99% - 100%)    PHYSICAL EXAM:  Constitutional:  non-toxic, no distress  Head:  frontal scalp wound:  non-tender to touch, no erythema, no drainage   Eyes: no icterus   Ear/Nose/Throat: no oral lesion, no sinus tenderness on percussion	  Neck:no JVD, no lymphadenopathy, supple  Respiratory: CTA layla  Cardiovascular: S1S2 RRR, no murmurs  Gastrointestinal:soft, (+) BS, no HSM  Extremities:no e/e/c  Vascular: DP Pulse:	right normal; left normal            LABS:                        9.5    1.56  )-----------( 40       ( 25 Oct 2019 05:51 )             28.5     10-25    140  |  107  |  9   ----------------------------<  93  3.9   |  25  |  0.66    Ca    9.4      25 Oct 2019 05:51  Phos  4.7     10-25  Mg     1.9     10-25      PT/INR - ( 25 Oct 2019 06:19 )   PT: 13.5 sec;   INR: 1.19          PTT - ( 25 Oct 2019 06:19 )  PTT:27.3 sec  Urinalysis Basic - ( 23 Oct 2019 23:49 )    Color: Yellow / Appearance: SL Cloudy / S.015 / pH: x  Gluc: x / Ketone: Trace mg/dL  / Bili: Negative / Urobili: 0.2 E.U./dL   Blood: x / Protein: NEGATIVE mg/dL / Nitrite: NEGATIVE   Leuk Esterase: NEGATIVE / RBC: x / WBC x   Sq Epi: x / Non Sq Epi: x / Bacteria: x        MICROBIOLOGY:    Culture - Other (10.24.19 @ 08:34)    Gram Stain:   No organisms seen  Numerous white blood cells    Specimen Source: .Other Right front scalp wound superf    Culture Results:   Rare Staphylococcus capitis    Culture - Urine (10.24.19 @ 08:30)    Specimen Source: .Urine Clean Catch (Midstream)    Culture Results:   Specimen appears CONTAMINATED. Lab suggests repeat clean catch specimen.      RADIOLOGY & ADDITIONAL STUDIES:
Vascular Access Service Consult Note    49yFemaleHEAL ISSUES - PROBLEM Dx:  Postoperative state: Postoperative state  Aplastic anemia: Aplastic anemia  Pancytopenia: Pancytopenia  Preoperative clearance: Preoperative clearance  Brain mass: Brain mass  Suspected deep vein thrombosis (DVT)  Wound dehiscence: Wound dehiscence             Diagnosis: epidural infection    Indications for Vascular Access (Check all that apply)  [ X ]  Antibiotic Therapy       Antibiotic Prescribed:  oxacillin                                                                           Expected Duration of Therapy:  6 weeks             [  ]  IV Hydration  [  ]  Total Parenteral Nutrition  [  ]  Chemotherapy  [  ]  Difficult Venous Access  [  ]  CVP monitoring  [  ]  Medications with high potential for tissue necrosis on extravasation  [  ]  Other    Screening (Check all that apply)  Previous Radiation to chest  [  ] Yes      [  XNo  Breast Cancer                          [  ] Left     [  ]  Right    [X  ]  No  Lymph Node Dissection         [  ] Left     [  ]  Right    [ X ]  No  Pacemaker or ICD                   [  ] Left     [  ]  Right    [ X ]  No  Upper Extremity DVT             [  ] Left     [  ]  Right    [ X ]  No  Chronic Kidney Disease         [  ]  Yes     [  X]  No  Hemodialysis                           [  ]  Yes     [X  ]  No  AV Fistula/ Graft                     [  ]  Left    [  ]  Right    [  X]  No  Temp>101F in past 24 H       [  ]  Yes     [ X ]  No  H/O PICC/Midline                   [  ]  Yes     [ X ]  No    Lab data:                        8.4    4.77  )-----------( 87       ( 31 Oct 2019 06:26 )             25.4     10-31    142  |  107  |  9   ----------------------------<  109<H>  4.2   |  27  |  0.68    Ca    9.7      31 Oct 2019 06:26  Phos  3.5     10-30  Mg     1.8     10-30                I have reviewed the chart, interviewed and examined the patient and determined that this patient:  [  X] Is a candidate for a PICC line  [  ] Is a candidate for a Midline  [  ] Is not a candidate for vascular access device (reason)    Lumens:    [  X] Single  [  ] Double
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 Garcia 2019, s/p RTT/Chemo in [place] 2019, presents to ED [facility] with c/o wound drainage since 10/19/2019.  Pt denies sob, cp, chills/fevers, photophobia, neck pain, acute visual changes, acute numbness or weakness. (23 Oct 2019 23:36)      PAST MEDICAL & SURGICAL HISTORY:  Brain mass  Fibroids  H/O hysterectomy with unilateral oophorectomy       Review of Systems:  · General	negative  · Skin/Breast	negative  · Ophthalmologic	negative  · ENMT	negative  · Respiratory and Thorax	negative  · Cardiovascular	negative  · Gastrointestinal	negative  · Genitourinary	negative  · Musculoskeletal Comments	negative  · Neurological	negative      MEDICATIONS  (STANDING):  dextrose 5%. 1000 milliLiter(s) (50 mL/Hr) IV Continuous <Continuous>  dextrose 50% Injectable 12.5 Gram(s) IV Push once  insulin lispro (HumaLOG) corrective regimen sliding scale   SubCutaneous Before meals and at bedtime  senna 1 Tablet(s) Oral at bedtime    MEDICATIONS  (PRN):  dextrose 40% Gel 15 Gram(s) Oral once PRN Blood Glucose LESS THAN 70 milliGRAM(s)/deciliter  glucagon  Injectable 1 milliGRAM(s) IntraMuscular once PRN Glucose LESS THAN 70 milligrams/deciliter  ondansetron Injectable 4 milliGRAM(s) IV Push every 6 hours PRN Nausea and/or Vomiting      Allergies    penicillins (Unknown)  sulfa drugs (Unknown)    Intolerances        SOCIAL HISTORY:    FAMILY HISTORY:  No pertinent family history in first degree relatives      Vital Signs Last 24 Hrs  T(C): 36.8 (25 Oct 2019 13:08), Max: 36.9 (24 Oct 2019 17:07)  T(F): 98.3 (25 Oct 2019 13:08), Max: 98.5 (24 Oct 2019 17:07)  HR: 64 (25 Oct 2019 08:49) (62 - 78)  BP: 98/59 (25 Oct 2019 08:49) (94/53 - 105/67)  BP(mean): 74 (25 Oct 2019 08:49) (68 - 81)  RR: 20 (25 Oct 2019 08:49) (16 - 20)  SpO2: 99% (25 Oct 2019 08:49) (99% - 100%)     Physical Exam:  · Constitutional	detailed exam  · Constitutional Details	well-developed; well-groomed  · Eyes	EOMI; PERRL; no drainage or redness  · ENMT Comments	dry mucous membranes  · Respiratory	detailed exam  · Respiratory Comments	normal breath sounds at the lung bases bilaterally  · Cardiovascular	Regular rate & rhythm, normal S1, S2; no murmurs, gallops or rubs; no S3, S4  · Abd-Soft non tender  ·Ext-no edema, clubbing or cyanosis      LABS:                        9.5    1.56  )-----------( 40       ( 25 Oct 2019 05:51 )             28.5     10-25    140  |  107  |  9   ----------------------------<  93  3.9   |  25  |  0.66    Ca    9.4      25 Oct 2019 05:51  Phos  4.7     10-25  Mg     1.9     10-25      PT/INR - ( 25 Oct 2019 06:19 )   PT: 13.5 sec;   INR: 1.19          PTT - ( 25 Oct 2019 06:19 )  PTT:27.3 sec  Urinalysis Basic - ( 23 Oct 2019 23:49 )    Color: Yellow / Appearance: SL Cloudy / S.015 / pH: x  Gluc: x / Ketone: Trace mg/dL  / Bili: Negative / Urobili: 0.2 E.U./dL   Blood: x / Protein: NEGATIVE mg/dL / Nitrite: NEGATIVE   Leuk Esterase: NEGATIVE / RBC: x / WBC x   Sq Epi: x / Non Sq Epi: x / Bacteria: x        RADIOLOGY & ADDITIONAL STUDIES:
Patient is a 49y old  Female who presents with a chief complaint of Wound drainage/dehiscence (24 Oct 2019 17:18)      HPI:  Pt is 50yo female, R handed, known to Neurosurgery  s/p Right craniotomy Stereotactic 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 Garcia 2019, s/p RTT/Chemo in [place] 2019, presents to ED [facility] with c/o wound drainage since 10/19/2019.  Pt denies sob, cp, chills/fevers, photophobia, neck pain, acute visual changes, acute numbness or weakness. (23 Oct 2019 23:36)      PAST MEDICAL & SURGICAL HISTORY:  Brain mass  Fibroids  H/O hysterectomy with unilateral oophorectomy      FAMILY HISTORY:  No pertinent family history in first degree relatives      SOCIAL HISTORY:  Smoking Status: [ ] Current, [ ] Former, [ ] Never  Pack Years:    MEDICATIONS:  Pulmonary:    Antimicrobials:    Anticoagulants:    Onc:    GI/:  senna 1 Tablet(s) Oral at bedtime    Endocrine:  dextrose 40% Gel 15 Gram(s) Oral once PRN  dextrose 50% Injectable 12.5 Gram(s) IV Push once  glucagon  Injectable 1 milliGRAM(s) IntraMuscular once PRN  insulin lispro (HumaLOG) corrective regimen sliding scale   SubCutaneous Before meals and at bedtime    Cardiac:    Other Medications:  dextrose 5%. 1000 milliLiter(s) IV Continuous <Continuous>  ondansetron Injectable 4 milliGRAM(s) IV Push every 6 hours PRN  sodium chloride 0.9%. 1000 milliLiter(s) IV Continuous <Continuous>      Allergies    penicillins (Unknown)  sulfa drugs (Unknown)    Intolerances        Review of Systems:   •	General: negative  •	Skin/Breast: negative  •	Ophthalmologic: negative  •	ENMT: negative  •	Respiratory and Thorax: negative  •	Cardiovascular: negative  •	Gastrointestinal: negative  •	Genitourinary: negative  •	Musculoskeletal: negative  •	Neurological: negative  •	Psychiatric: negative  •	Hematology/Lymphatics: negative  •	Endocrine: negative  •	Allergic/Immunologic: negative    Physical Exam:   • Constitutional:	Well-developed, well nourished  • Eyes:	EOMI; PERRL; no drainage or redness  • ENMT:	No oral lesions; no gross abnormalities  • Neck	No bruits; no thyromegaly or nodules  • Breasts:	not examined  • Back:	No deformity or limitation of movement  • Respiratory:	Breath Sounds equal & clear to percussion & auscultation, no accessory muscle use  • Cardiovascular:	Regular rate & rhythm, normal S1, S2; no murmurs, gallops or rubs; no S3, S4  • Gastrointestinal:	Soft, non-tender, no hepatosplenomegaly, normal bowel sounds  • Genitourinary:	not examined  • Rectal: not examined  • Extremities:	No cyanosis, clubbing or edema  • Vascular:	Equal and normal pulses (carotid, femoral, dorsalis pedis)  • Neurologica:l	not examined  • Skin:	No lesions; no rash  • Lymph Nodes:	No lymphadedenopathy  • Musculoskeletal:	No joint pain, swelling or deformity; no limitation of movement      Vital Signs Last 24 Hrs  T(C): 36.9 (24 Oct 2019 17:07), Max: 36.9 (24 Oct 2019 05:34)  T(F): 98.5 (24 Oct 2019 17:07), Max: 98.5 (24 Oct 2019 05:34)  HR: 68 (24 Oct 2019 20:14) (64 - 78)  BP: 94/54 (24 Oct 2019 20:14) (94/54 - 106/72)  BP(mean): 71 (24 Oct 2019 20:14) (70 - 77)  RR: 16 (24 Oct 2019 20:14) (16 - 18)  SpO2: 100% (24 Oct 2019 20:14) (98% - 100%)    10-23 @ :  -  10-24 @ 07:00  --------------------------------------------------------  IN: 490 mL / OUT: 0 mL / NET: 490 mL    10-24 @ 07:  -  10-24 @ 21:27  --------------------------------------------------------  IN: 1800 mL / OUT: 1500 mL / NET: 300 mL          LABS:      CBC Full  -  ( 23 Oct 2019 21:49 )  WBC Count : 2.75 K/uL  RBC Count : 2.97 M/uL  Hemoglobin : 10.8 g/dL  Hematocrit : 31.9 %  Platelet Count - Automated : 49 K/uL  Mean Cell Volume : 107.4 fl  Mean Cell Hemoglobin : 36.4 pg  Mean Cell Hemoglobin Concentration : 33.9 gm/dL  Auto Neutrophil # : 1.91 K/uL  Auto Lymphocyte # : 0.58 K/uL  Auto Monocyte # : 0.19 K/uL  Auto Eosinophil # : 0.02 K/uL  Auto Basophil # : 0.02 K/uL  Auto Neutrophil % : 67.5 %  Auto Lymphocyte % : 21.0 %  Auto Monocyte % : 7.0 %  Auto Eosinophil % : 0.9 %  Auto Basophil % : 0.9 %    1023    141  |  104  |  9   ----------------------------<  125<H>  4.4   |  24  |  0.52    Ca    10.1      23 Oct 2019 21:49      PT/INR - ( 23 Oct 2019 21:49 )   PT: 13.5 sec;   INR: 1.19                Urinalysis Basic - ( 23 Oct 2019 23:49 )    Color: Yellow / Appearance: SL Cloudy / S.015 / pH: x  Gluc: x / Ketone: Trace mg/dL  / Bili: Negative / Urobili: 0.2 E.U./dL   Blood: x / Protein: NEGATIVE mg/dL / Nitrite: NEGATIVE   Leuk Esterase: NEGATIVE / RBC: x / WBC x   Sq Epi: x / Non Sq Epi: x / Bacteria: x        CXR clear  EKG RSR normal  venous doppler negative        RADIOLOGY & ADDITIONAL STUDIES (The following images were personally reviewed):

## 2019-11-01 ENCOUNTER — TRANSCRIPTION ENCOUNTER (OUTPATIENT)
Age: 49
End: 2019-11-01

## 2019-11-01 LAB
ANION GAP SERPL CALC-SCNC: 10 MMOL/L — SIGNIFICANT CHANGE UP (ref 5–17)
BUN SERPL-MCNC: 10 MG/DL — SIGNIFICANT CHANGE UP (ref 7–23)
CALCIUM SERPL-MCNC: 9.5 MG/DL — SIGNIFICANT CHANGE UP (ref 8.4–10.5)
CHLORIDE SERPL-SCNC: 105 MMOL/L — SIGNIFICANT CHANGE UP (ref 96–108)
CO2 SERPL-SCNC: 27 MMOL/L — SIGNIFICANT CHANGE UP (ref 22–31)
CREAT SERPL-MCNC: 0.63 MG/DL — SIGNIFICANT CHANGE UP (ref 0.5–1.3)
CRP SERPL-MCNC: 1.2 MG/DL — HIGH (ref 0–0.4)
CULTURE RESULTS: NO GROWTH — SIGNIFICANT CHANGE UP
ERYTHROCYTE [SEDIMENTATION RATE] IN BLOOD: 38 MM/HR — HIGH
GLUCOSE SERPL-MCNC: 97 MG/DL — SIGNIFICANT CHANGE UP (ref 70–99)
HCT VFR BLD CALC: 24.7 % — LOW (ref 34.5–45)
HGB BLD-MCNC: 8 G/DL — LOW (ref 11.5–15.5)
MCHC RBC-ENTMCNC: 32.4 GM/DL — SIGNIFICANT CHANGE UP (ref 32–36)
MCHC RBC-ENTMCNC: 34.6 PG — HIGH (ref 27–34)
MCV RBC AUTO: 106.9 FL — HIGH (ref 80–100)
NRBC # BLD: 0 /100 WBCS — SIGNIFICANT CHANGE UP (ref 0–0)
PLATELET # BLD AUTO: 111 K/UL — LOW (ref 150–400)
POTASSIUM SERPL-MCNC: 3.4 MMOL/L — LOW (ref 3.5–5.3)
POTASSIUM SERPL-SCNC: 3.4 MMOL/L — LOW (ref 3.5–5.3)
RBC # BLD: 2.31 M/UL — LOW (ref 3.8–5.2)
RBC # FLD: 13 % — SIGNIFICANT CHANGE UP (ref 10.3–14.5)
SODIUM SERPL-SCNC: 142 MMOL/L — SIGNIFICANT CHANGE UP (ref 135–145)
SPECIMEN SOURCE: SIGNIFICANT CHANGE UP
SURGICAL PATHOLOGY STUDY: SIGNIFICANT CHANGE UP
VANCOMYCIN TROUGH SERPL-MCNC: 58.3 UG/ML — CRITICAL HIGH (ref 10–20)
WBC # BLD: 14.45 K/UL — HIGH (ref 3.8–10.5)
WBC # FLD AUTO: 14.45 K/UL — HIGH (ref 3.8–10.5)

## 2019-11-01 PROCEDURE — 99232 SBSQ HOSP IP/OBS MODERATE 35: CPT | Mod: GC

## 2019-11-01 PROCEDURE — 76937 US GUIDE VASCULAR ACCESS: CPT | Mod: 26,59

## 2019-11-01 PROCEDURE — 36569 INSJ PICC 5 YR+ W/O IMAGING: CPT

## 2019-11-01 RX ORDER — CHLORHEXIDINE GLUCONATE 213 G/1000ML
1 SOLUTION TOPICAL
Refills: 0 | Status: DISCONTINUED | OUTPATIENT
Start: 2019-11-02 | End: 2019-11-04

## 2019-11-01 RX ORDER — SODIUM CHLORIDE 9 MG/ML
10 INJECTION INTRAMUSCULAR; INTRAVENOUS; SUBCUTANEOUS
Refills: 0 | Status: DISCONTINUED | OUTPATIENT
Start: 2019-11-01 | End: 2019-11-04

## 2019-11-01 RX ADMIN — Medication 1 APPLICATION(S): at 18:09

## 2019-11-01 RX ADMIN — Medication 1 APPLICATION(S): at 05:24

## 2019-11-01 RX ADMIN — OXYCODONE AND ACETAMINOPHEN 1 TABLET(S): 5; 325 TABLET ORAL at 01:00

## 2019-11-01 RX ADMIN — CEFEPIME 100 MILLIGRAM(S): 1 INJECTION, POWDER, FOR SOLUTION INTRAMUSCULAR; INTRAVENOUS at 05:23

## 2019-11-01 RX ADMIN — Medication 166.67 MILLIGRAM(S): at 06:21

## 2019-11-01 RX ADMIN — Medication 166.67 MILLIGRAM(S): at 18:09

## 2019-11-01 RX ADMIN — CEFEPIME 100 MILLIGRAM(S): 1 INJECTION, POWDER, FOR SOLUTION INTRAMUSCULAR; INTRAVENOUS at 20:59

## 2019-11-01 RX ADMIN — CEFEPIME 100 MILLIGRAM(S): 1 INJECTION, POWDER, FOR SOLUTION INTRAMUSCULAR; INTRAVENOUS at 13:11

## 2019-11-01 RX ADMIN — OXYCODONE AND ACETAMINOPHEN 1 TABLET(S): 5; 325 TABLET ORAL at 00:06

## 2019-11-01 NOTE — DISCHARGE NOTE PROVIDER - NSDCFUSCHEDAPPT_GEN_ALL_CORE_FT
BRITTANY CARTER ; 12/18/2019 ; NPP Med  [address] BRITTANY CARTER ; 11/11/2019 ; NPP Neurosurg [address]   BRITTANY CARTER ; 12/18/2019 ; NPP Med  [address]

## 2019-11-01 NOTE — PROGRESS NOTE ADULT - SUBJECTIVE AND OBJECTIVE BOX
Interval Events: Reviewed  Patient seen and examined at bedside.    Patient is a 49y old  Female who presents with a chief complaint of Wound drainage/dehiscence (01 Nov 2019 10:37)  she is doing better and she is walking in the room without assistance    PAST MEDICAL & SURGICAL HISTORY:  Brain mass  Fibroids  H/O hysterectomy with unilateral oophorectomy      MEDICATIONS:  Pulmonary:    Antimicrobials:  vancomycin  IVPB 1250 milliGRAM(s) IV Intermittent every 12 hours    Anticoagulants:    Cardiac:      Allergies    penicillins (Unknown)  sulfa drugs (Unknown)    Intolerances        Vital Signs Last 24 Hrs  T(C): 36.6 (02 Nov 2019 17:03), Max: 37 (02 Nov 2019 13:59)  T(F): 97.9 (02 Nov 2019 17:03), Max: 98.6 (02 Nov 2019 13:59)  HR: 77 (02 Nov 2019 17:03) (62 - 87)  BP: 105/71 (02 Nov 2019 17:03) (94/62 - 109/77)  BP(mean): --  RR: 16 (02 Nov 2019 17:03) (15 - 17)  SpO2: 100% (02 Nov 2019 17:03) (98% - 100%)    11-01 @ 07:01 - 11-02 @ 07:00  --------------------------------------------------------  IN: 1020 mL / OUT: 2100 mL / NET: -1080 mL    11-02 @ 08:01 - 11-02 @ 18:20  --------------------------------------------------------  IN: 1240 mL / OUT: 1100 mL / NET: 140 mL          Review of Systems:   •	General: negative  •	Skin/Breast: negative  •	Ophthalmologic: negative  •	ENMT: negative  •	Respiratory and Thorax: negative  •	Cardiovascular: negative  •	Gastrointestinal: negative  •	Genitourinary: negative  •	Musculoskeletal: negative  •	Neurological: negative  •	Psychiatric: negative  •	Hematology/Lymphatics: negative  •	Endocrine: negative  •	Allergic/Immunologic: negative    Physical Exam:   • Constitutional:	Well-developed, well nourished  • Eyes:	EOMI; PERRL; no drainage or redness  • ENMT:	No oral lesions; no gross abnormalities  • Neck	No bruits; no thyromegaly or nodules  • Breasts:	not examined  • Back:	No deformity or limitation of movement  • Respiratory:	Breath Sounds equal & clear to percussion & auscultation, no accessory muscle use  • Cardiovascular:	Regular rate & rhythm, normal S1, S2; no murmurs, gallops or rubs; no S3, S4  • Gastrointestinal:	Soft, non-tender, no hepatosplenomegaly, normal bowel sounds  • Genitourinary:	not examined  • Rectal: not examined  • Extremities:	No cyanosis, clubbing or edema  • Vascular:	Equal and normal pulses (carotid, femoral, dorsalis pedis)  • Neurologica:l	not examined  • Skin:	No lesions; no rash  • Lymph Nodes:	No lymphadedenopathy  • Musculoskeletal:	No joint pain, swelling or deformity; no limitation of movement        LABS:      CBC Full  -  ( 01 Nov 2019 05:49 )  WBC Count : 14.45 K/uL  RBC Count : 2.31 M/uL  Hemoglobin : 8.0 g/dL  Hematocrit : 24.7 %  Platelet Count - Automated : 111 K/uL  Mean Cell Volume : 106.9 fl  Mean Cell Hemoglobin : 34.6 pg  Mean Cell Hemoglobin Concentration : 32.4 gm/dL  Auto Neutrophil # : x  Auto Lymphocyte # : x  Auto Monocyte # : x  Auto Eosinophil # : x  Auto Basophil # : x  Auto Neutrophil % : x  Auto Lymphocyte % : x  Auto Monocyte % : x  Auto Eosinophil % : x  Auto Basophil % : x    11-01    142  |  105  |  10  ----------------------------<  97  3.4<L>   |  27  |  0.63    Ca    9.5      01 Nov 2019 05:49                          RADIOLOGY & ADDITIONAL STUDIES (The following images were personally reviewed):  Elliott:                                     No  Urine output:                       adequate  DVT prophylaxis:                 Yes  Flattus:                                  Yes  Bowel movement:              No

## 2019-11-01 NOTE — PROCEDURE NOTE - NSPOSTCAREGUIDE_GEN_A_CORE
Verbal/written post procedure instructions were given to patient/caregiver
Instructed patient/caregiver regarding signs and symptoms of infection/Care for catheter as per unit/ICU protocols/Verbal/written post procedure instructions were given to patient/caregiver/Keep the cast/splint/dressing clean and dry

## 2019-11-01 NOTE — PROGRESS NOTE ADULT - SUBJECTIVE AND OBJECTIVE BOX
HPI:  Pt is 50yo female, R handed, known to Neurosurgery  s/p Right craniotomy Stereotactic 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 Garcia 2/2019, s/p RTT/Chemo in [place] 5/2019, presents to ED [facility] with c/o wound drainage since 10/19/2019.  Pt denies sob, cp, chills/fevers, photophobia, neck pain, acute visual changes, acute numbness or weakness. (23 Oct 2019 23:36)    Hospital course:   10/23: presents to ED [facility] with c/o wound drainage since 10/19/2019.   10/24: WILMA overnight.   10/25: WILMA ovenright, pre op for washout revision this am- cancelled  10/26: WILMA overnight. Cherise following for pancytopenia. ID consulted. F/u OR plan for wound revision   10/27:  10/28: s/p exploration and washout of craniotomy wound with plastics closure, +ELIZABETH in place.   10/29: WILMA overnight. Pain controlled. ELIZABETH in place  10/30: WILMA overnight, neuro stable. ELIZABETH drain with high output overnight. Cont cefepime, Vanc.   10/31: Cleared by PT for DC home. Continue ELIZABETH drain on half suction. ID following. ELIZABETH output 27.5cc overnight.  11/1: WILMA overnight, neuro stable. ELIZABETH removed yesterday. on Vanc/Cefepime overnight.     Vital Signs Last 24 Hrs  T(C): 36.9 (31 Oct 2019 20:35), Max: 37.2 (31 Oct 2019 12:55)  T(F): 98.4 (31 Oct 2019 20:35), Max: 99 (31 Oct 2019 12:55)  HR: 68 (31 Oct 2019 20:35) (61 - 74)  BP: 98/67 (31 Oct 2019 20:35) (92/60 - 109/69)  BP(mean): --  RR: 16 (31 Oct 2019 20:35) (16 - 18)  SpO2: 99% (31 Oct 2019 20:35) (97% - 100%)    I&O's Summary    30 Oct 2019 07:01  -  31 Oct 2019 07:00  --------------------------------------------------------  IN: 1340 mL / OUT: 2817.5 mL / NET: -1477.5 mL    31 Oct 2019 07:01  -  01 Nov 2019 00:19  --------------------------------------------------------  IN: 1010 mL / OUT: 900 mL / NET: 110 mL        PHYSICAL EXAM:  Gen: laying in hospital bed, NAD  Neurological: AA+Ox3, OE spont, FC  CN II-XII: PERRL, EOMI   Motor: MAEx4, 5/5 strength throughout  SILT in UE and LE b/l  Cardiovascular:  regular rate and rhythm  Respiratory: clear to auscultation  Incision/Wound: right frontal scalp incision C/D/I    TUBES/LINES:  [] Elliott  [] Lumbar Drain  [] Wound Drains  [] Others    DIET:  [] NPO  [x] Mechanical  [] Tube feeds    LABS:                        8.4    4.77  )-----------( 87       ( 31 Oct 2019 06:26 )             25.4     10-31    142  |  107  |  9   ----------------------------<  109<H>  4.2   |  27  |  0.68    Ca    9.7      31 Oct 2019 06:26  Phos  3.5     10-30  Mg     1.8     10-30              CAPILLARY BLOOD GLUCOSE          Drug Levels: [] N/A  Vancomycin Level, Trough: 13.9 ug/mL (10-30 @ 17:09)    CSF Analysis: [] N/A      Allergies    penicillins (Unknown)  sulfa drugs (Unknown)    Intolerances      MEDICATIONS:  Antibiotics:  cefepime   IVPB 2000 milliGRAM(s) IV Intermittent every 8 hours  vancomycin  IVPB 1250 milliGRAM(s) IV Intermittent every 12 hours    Neuro:  acetaminophen   Tablet .. 650 milliGRAM(s) Oral every 6 hours PRN  HYDROmorphone  Injectable 0.5 milliGRAM(s) IV Push every 4 hours PRN  ondansetron Injectable 4 milliGRAM(s) IV Push every 6 hours PRN  oxycodone    5 mG/acetaminophen 325 mG 1 Tablet(s) Oral every 6 hours PRN    Anticoagulation:    OTHER:  BACItracin   Ointment 1 Application(s) Topical two times a day  [id number]-[id number] Injectable 300 MICROGram(s) SubCutaneous <User Schedule>  influenza   Vaccine 0.5 milliLiter(s) IntraMuscular once  senna 1 Tablet(s) Oral at bedtime    IVF:    CULTURES:  Culture Results:   Testing in progress (10-29 @ 01:40)  Culture Results:   Testing in progress (10-29 @ 01:23)    RADIOLOGY & ADDITIONAL TESTS:      ASSESSMENT:  49 year old female with 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 Luong following, recs appreciated, cont Zarxio x5 days  - ID following, recs appreciated. For discharge: weekly CBC with diff, CMP, ESR. f/u with Dr. Calixto 12/18 at 10AM  - cont Vanc/Zosyn, vanc trough at 6pm   - PICC pending   - DVT PROPHYLAXIS: [x] Venodynes  [] Heparin/Lovenox  - PT/OOB    DISPOSITION: regional, full code, dispo pending    d/w Dr. Garcia

## 2019-11-01 NOTE — DISCHARGE NOTE PROVIDER - HOSPITAL COURSE
· Subjective and Objective:     HPI:    Pt is 48yo female, R handed, known to Neurosurgery  s/p Right craniotomy Stereotactic 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 Garcia 2/2019, s/p RTT/Chemo in [place] 5/2019, presents to ED [facility] with c/o wound drainage since 10/19/2019.  Pt denies sob, cp, chills/fevers, photophobia, neck pain, acute visual changes, acute numbness or weakness. (23 Oct 2019 23:36)        Hospital course:     10/23: presents to ED [facility] with c/o wound drainage since 10/19/2019.     10/24: WILMA overnight.     10/25: WILMA ovenright, pre op for washout revision this am- cancelled    10/26: WILMA overnight. Cherise following for pancytopenia. ID consulted. F/u OR plan for wound revision     10/27:    10/28: s/p exploration and washout of craniotomy wound with plastics closure, +ELIZABETH in place.     10/29: WILMA overnight. Pain controlled. ELIZABETH in place    10/30: WILMA overnight, neuro stable. ELIZABETH drain with high output overnight. Cont cefepime, Vanc.     10/31: Cleared by PT for DC home. Continue ELIZABETH drain on half suction. ID following. ELIZABETH output 27.5cc overnight.    11/1: WILMA overnight, neuro stable. ELIZABETH removed yesterday. on Vanc/Cefepime overnight.     11/2: WILMA overnight, neuro stable.  PICC line in place.      11/3 Pt doing well. Awaiting skin allergy testing.    11/4 uneventful night  WIll follow up with ID in regard to PCN allergy  F/U Plt 75   WBC down to 2.75  on Neupogen QOD    Follow weekly ESR/CRP        ID will sent home on IV Vancomycin and will not need to assess for PCN allergy

## 2019-11-01 NOTE — PROCEDURE NOTE - NSPROCDETAILS_GEN_ALL_CORE
sterile dressing applied/supine position/ultrasound guidance/location identified, draped/prepped, sterile technique used/sterile technique, catheter placed/ultrasound assessment

## 2019-11-01 NOTE — DISCHARGE NOTE PROVIDER - CARE PROVIDER_API CALL
Byron Fitzpatrick)  Neurosurgery  [address], [address] [place] [address]  Phone: [phone]  Fax: [phone]  Follow Up Time:     Mj Calixto)  Internal Medicine  [address], [address] [place] [address]  Phone: [phone]  Fax: [phone]  Follow Up Time:

## 2019-11-01 NOTE — DISCHARGE NOTE PROVIDER - NSDCMRMEDTOKEN_GEN_ALL_CORE_FT
Colace 100 mg oral capsule: 1 cap(s) orally once a day, As Needed -for constipation   dexamethasone 2 mg oral tablet: 2 tabs PO every 8 hrs x1 day  2 tabs every 12 hrs x1 days,  1 tab every 12 hrs x1 days,  oxyCODONE 5 mg oral tablet: 1 tab(s) orally every 6 hours, As Needed -for moderate pain MDD:4 tab   Protonix 40 mg oral delayed release tablet: 1 tab(s) orally once a day (before a meal) MDD:1 tab  RisperDAL 1 mg oral tablet: 1 tab(s) orally once a day (at bedtime) MDD:1 tab  traMADol 50 mg oral tablet: 1 tab(s) orally 2 times a day, As Needed -for moderate pain  for pain MDD:2  tabs acetaminophen 325 mg oral tablet: 2 tab(s) orally every 6 hours, As needed, Temp greater or equal to 38C (100.4F), Mild Pain (1 - 3)  bacitracin 500 units/g topical ointment: 1 application topically 2 times a day  Colace 100 mg oral capsule: 1 cap(s) orally once a day, As Needed -for constipation   dexamethasone 2 mg oral tablet: 2 tabs PO every 8 hrs x1 day  2 tabs every 12 hrs x1 days,  1 tab every 12 hrs x1 days,  oxycodone-acetaminophen 5 mg-325 mg oral tablet: 1 tab(s) orally every 6 hours, As needed, Moderate Pain (4 - 6) MDD:4  senna oral tablet: 1 tab(s) orally once a day (at bedtime)  vancomycin 1.25 g intravenous injection: 1.25 gram(s) intravenous every 12 hours unti  12/13/2019

## 2019-11-01 NOTE — DISCHARGE NOTE PROVIDER - CARE PROVIDERS DIRECT ADDRESSES
[email or web address]

## 2019-11-02 LAB
-  CEFAZOLIN: SIGNIFICANT CHANGE UP
-  CLINDAMYCIN: SIGNIFICANT CHANGE UP
-  ERYTHROMYCIN: SIGNIFICANT CHANGE UP
-  LINEZOLID: SIGNIFICANT CHANGE UP
-  OXACILLIN: SIGNIFICANT CHANGE UP
-  PENICILLIN: SIGNIFICANT CHANGE UP
-  RIFAMPIN: SIGNIFICANT CHANGE UP
-  TRIMETHOPRIM/SULFAMETHOXAZOLE: SIGNIFICANT CHANGE UP
-  VANCOMYCIN: SIGNIFICANT CHANGE UP
CULTURE RESULTS: SIGNIFICANT CHANGE UP
METHOD TYPE: SIGNIFICANT CHANGE UP
ORGANISM # SPEC MICROSCOPIC CNT: SIGNIFICANT CHANGE UP
ORGANISM # SPEC MICROSCOPIC CNT: SIGNIFICANT CHANGE UP
SPECIMEN SOURCE: SIGNIFICANT CHANGE UP
VANCOMYCIN TROUGH SERPL-MCNC: 18.1 UG/ML — SIGNIFICANT CHANGE UP (ref 10–20)

## 2019-11-02 RX ADMIN — Medication 1 APPLICATION(S): at 05:00

## 2019-11-02 RX ADMIN — Medication 166.67 MILLIGRAM(S): at 18:12

## 2019-11-02 RX ADMIN — CEFEPIME 100 MILLIGRAM(S): 1 INJECTION, POWDER, FOR SOLUTION INTRAMUSCULAR; INTRAVENOUS at 13:28

## 2019-11-02 RX ADMIN — Medication 1 APPLICATION(S): at 18:12

## 2019-11-02 RX ADMIN — HYDROMORPHONE HYDROCHLORIDE 0.5 MILLIGRAM(S): 2 INJECTION INTRAMUSCULAR; INTRAVENOUS; SUBCUTANEOUS at 22:10

## 2019-11-02 RX ADMIN — CEFEPIME 100 MILLIGRAM(S): 1 INJECTION, POWDER, FOR SOLUTION INTRAMUSCULAR; INTRAVENOUS at 05:00

## 2019-11-02 RX ADMIN — Medication 166.67 MILLIGRAM(S): at 07:51

## 2019-11-02 RX ADMIN — CHLORHEXIDINE GLUCONATE 1 APPLICATION(S): 213 SOLUTION TOPICAL at 05:00

## 2019-11-02 RX ADMIN — HYDROMORPHONE HYDROCHLORIDE 0.5 MILLIGRAM(S): 2 INJECTION INTRAMUSCULAR; INTRAVENOUS; SUBCUTANEOUS at 22:40

## 2019-11-02 NOTE — PROGRESS NOTE ADULT - SUBJECTIVE AND OBJECTIVE BOX
HPI:  Pt is 50yo female, R handed, known to Neurosurgery  s/p Right craniotomy Stereotactic 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 Garcia 2/2019, s/p RTT/Chemo in [place] 5/2019, presents to ED [facility] with c/o wound drainage since 10/19/2019.  Pt denies sob, cp, chills/fevers, photophobia, neck pain, acute visual changes, acute numbness or weakness. (23 Oct 2019 23:36)    Hospital course:   10/23: presents to ED [facility] with c/o wound drainage since 10/19/2019.   10/24: WILMA overnight.   10/25: WILMA ovenright, pre op for washout revision this am- cancelled  10/26: WILMA overnight. Cherise following for pancytopenia. ID consulted. F/u OR plan for wound revision   10/27:  10/28: s/p exploration and washout of craniotomy wound with plastics closure, +ELIZABETH in place.   10/29: WILMA overnight. Pain controlled. ELIZABETH in place  10/30: WILMA overnight, neuro stable. ELIZABETH drain with high output overnight. Cont cefepime, Vanc.   10/31: Cleared by PT for DC home. Continue ELIZABETH drain on half suction. ID following. ELIZABETH output 27.5cc overnight.  11/1: WILMA overnight, neuro stable. ELIZABETH removed yesterday. on Vanc/Cefepime overnight.   11/2: WILMA overnight, neuro stable.  PICC line in place.        ICU Vital Signs Last 24 Hrs  T(C): 36.6 (02 Nov 2019 17:03), Max: 37 (02 Nov 2019 13:59)  T(F): 97.9 (02 Nov 2019 17:03), Max: 98.6 (02 Nov 2019 13:59)  HR: 77 (02 Nov 2019 17:03) (62 - 87)  BP: 105/71 (02 Nov 2019 17:03) (94/62 - 109/77)  BP(mean): --  ABP: --  ABP(mean): --  RR: 16 (02 Nov 2019 17:03) (15 - 17)  SpO2: 100% (02 Nov 2019 17:03) (98% - 100%)    I&O's Summary    01 Nov 2019 07:01  -  02 Nov 2019 07:00  --------------------------------------------------------  IN: 1020 mL / OUT: 2100 mL / NET: -1080 mL    02 Nov 2019 08:01  -  02 Nov 2019 18:51  --------------------------------------------------------  IN: 1240 mL / OUT: 1100 mL / NET: 140 mL    PHYSICAL EXAM:  Gen: laying in hospital bed, NAD  Neurological: AA+Ox3, OE spont, FC  CN II-XII: PERRL, EOMI   Motor: MAEx4, 5/5 strength throughout  SILT in UE and LE b/l  Cardiovascular:  regular rate and rhythm  Respiratory: clear to auscultation  Incision/Wound: right frontal scalp incision C/D/I    TUBES/LINES:  [] Elliott  [] Lumbar Drain  [] Wound Drains  [] Others    DIET:  [] NPO  [x] Mechanical  [] Tube feeds    LABS:                 8.0    14.45 )-----------( 111      ( 01 Nov 2019 05:49 )             24.7   11-01    142  |  105  |  10  ----------------------------<  97  3.4<L>   |  27  |  0.63    Ca    9.5      01 Nov 2019 05:49    CAPILLARY BLOOD GLUCOSE    Drug Levels: [] N/A  Vancomycin Level, Trough: 13.9 ug/mL (10-30 @ 17:09)    CSF Analysis: [] N/A      Allergies    penicillins (Unknown)  sulfa drugs (Unknown)    Intolerances      MEDICATIONS:  Antibiotics:  cefepime   IVPB 2000 milliGRAM(s) IV Intermittent every 8 hours  vancomycin  IVPB 1250 milliGRAM(s) IV Intermittent every 12 hours    Neuro:  acetaminophen   Tablet .. 650 milliGRAM(s) Oral every 6 hours PRN  HYDROmorphone  Injectable 0.5 milliGRAM(s) IV Push every 4 hours PRN  ondansetron Injectable 4 milliGRAM(s) IV Push every 6 hours PRN  oxycodone    5 mG/acetaminophen 325 mG 1 Tablet(s) Oral every 6 hours PRN    Anticoagulation:    OTHER:  BACItracin   Ointment 1 Application(s) Topical two times a day  influenza   Vaccine 0.5 milliLiter(s) IntraMuscular once  senna 1 Tablet(s) Oral at bedtime    IVF:    CULTURES:  Culture Results:   Testing in progress (10-29 @ 01:40)  Culture Results:   Testing in progress (10-29 @ 01:23)    RADIOLOGY & ADDITIONAL TESTS: no new      ASSESSMENT:  49 year old female with h/o 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 Luong following, recs appreciated, cont neupogen x5 days, now off  - ID following, recs appreciated. For discharge: weekly CBC with diff, CMP, ESR. f/u with Dr. Calixto 12/18 at 10AM:  UPDATE 11/2: Spoke with Dr. Mcnally, for now may stop cefepime and continue Vanc.  Monday will need to speak with medicine team for skin testing (not done by allergy, but done during week by medicine).  - PICC in place  - DVT PROPHYLAXIS: [x] Venodynes  [] Heparin/Lovenox  - PT/OOB    DISPOSITION: regional, full code, dispo pending    All above d/w Dr. Fitzpatrick.

## 2019-11-03 LAB
ANION GAP SERPL CALC-SCNC: 10 MMOL/L — SIGNIFICANT CHANGE UP (ref 5–17)
BASOPHILS # BLD AUTO: 0.01 K/UL — SIGNIFICANT CHANGE UP (ref 0–0.2)
BASOPHILS NFR BLD AUTO: 0.4 % — SIGNIFICANT CHANGE UP (ref 0–2)
BUN SERPL-MCNC: 8 MG/DL — SIGNIFICANT CHANGE UP (ref 7–23)
CALCIUM SERPL-MCNC: 9.7 MG/DL — SIGNIFICANT CHANGE UP (ref 8.4–10.5)
CHLORIDE SERPL-SCNC: 104 MMOL/L — SIGNIFICANT CHANGE UP (ref 96–108)
CO2 SERPL-SCNC: 25 MMOL/L — SIGNIFICANT CHANGE UP (ref 22–31)
CREAT SERPL-MCNC: 0.65 MG/DL — SIGNIFICANT CHANGE UP (ref 0.5–1.3)
EOSINOPHIL # BLD AUTO: 0.04 K/UL — SIGNIFICANT CHANGE UP (ref 0–0.5)
EOSINOPHIL NFR BLD AUTO: 1.5 % — SIGNIFICANT CHANGE UP (ref 0–6)
GLUCOSE SERPL-MCNC: 114 MG/DL — HIGH (ref 70–99)
HCT VFR BLD CALC: 25.9 % — LOW (ref 34.5–45)
HGB BLD-MCNC: 8.5 G/DL — LOW (ref 11.5–15.5)
IMM GRANULOCYTES NFR BLD AUTO: 1.1 % — SIGNIFICANT CHANGE UP (ref 0–1.5)
LYMPHOCYTES # BLD AUTO: 0.72 K/UL — LOW (ref 1–3.3)
LYMPHOCYTES # BLD AUTO: 26.2 % — SIGNIFICANT CHANGE UP (ref 13–44)
MAGNESIUM SERPL-MCNC: 1.9 MG/DL — SIGNIFICANT CHANGE UP (ref 1.6–2.6)
MCHC RBC-ENTMCNC: 32.8 GM/DL — SIGNIFICANT CHANGE UP (ref 32–36)
MCHC RBC-ENTMCNC: 35 PG — HIGH (ref 27–34)
MCV RBC AUTO: 106.6 FL — HIGH (ref 80–100)
MONOCYTES # BLD AUTO: 0.33 K/UL — SIGNIFICANT CHANGE UP (ref 0–0.9)
MONOCYTES NFR BLD AUTO: 12 % — SIGNIFICANT CHANGE UP (ref 2–14)
NEUTROPHILS # BLD AUTO: 1.62 K/UL — LOW (ref 1.8–7.4)
NEUTROPHILS NFR BLD AUTO: 58.8 % — SIGNIFICANT CHANGE UP (ref 43–77)
NRBC # BLD: 0 /100 WBCS — SIGNIFICANT CHANGE UP (ref 0–0)
PHOSPHATE SERPL-MCNC: 3.9 MG/DL — SIGNIFICANT CHANGE UP (ref 2.5–4.5)
PLATELET # BLD AUTO: 75 K/UL — LOW (ref 150–400)
POTASSIUM SERPL-MCNC: 3.7 MMOL/L — SIGNIFICANT CHANGE UP (ref 3.5–5.3)
POTASSIUM SERPL-SCNC: 3.7 MMOL/L — SIGNIFICANT CHANGE UP (ref 3.5–5.3)
RBC # BLD: 2.43 M/UL — LOW (ref 3.8–5.2)
RBC # FLD: 12.9 % — SIGNIFICANT CHANGE UP (ref 10.3–14.5)
SODIUM SERPL-SCNC: 139 MMOL/L — SIGNIFICANT CHANGE UP (ref 135–145)
VANCOMYCIN TROUGH SERPL-MCNC: 18.2 UG/ML — SIGNIFICANT CHANGE UP (ref 10–20)
WBC # BLD: 2.75 K/UL — LOW (ref 3.8–10.5)
WBC # FLD AUTO: 2.75 K/UL — LOW (ref 3.8–10.5)

## 2019-11-03 RX ORDER — FILGRASTIM 480MCG/1.6
300 VIAL (ML) INJECTION
Refills: 0 | Status: DISCONTINUED | OUTPATIENT
Start: 2019-11-03 | End: 2019-11-04

## 2019-11-03 RX ADMIN — SENNA PLUS 1 TABLET(S): 8.6 TABLET ORAL at 21:12

## 2019-11-03 RX ADMIN — Medication 166.67 MILLIGRAM(S): at 20:10

## 2019-11-03 RX ADMIN — Medication 166.67 MILLIGRAM(S): at 06:14

## 2019-11-03 RX ADMIN — Medication 1 APPLICATION(S): at 05:07

## 2019-11-03 RX ADMIN — Medication 300 MICROGRAM(S): at 16:19

## 2019-11-03 RX ADMIN — CHLORHEXIDINE GLUCONATE 1 APPLICATION(S): 213 SOLUTION TOPICAL at 05:07

## 2019-11-03 NOTE — PROGRESS NOTE ADULT - SUBJECTIVE AND OBJECTIVE BOX
HPI:  Pt is 50yo female, R handed, known to Neurosurgery  s/p Right craniotomy Stereotactic 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 Garcia 2/2019, s/p RTT/Chemo in [place] 5/2019, presents to ED [facility] with c/o wound drainage since 10/19/2019.  Pt denies sob, cp, chills/fevers, photophobia, neck pain, acute visual changes, acute numbness or weakness. (23 Oct 2019 23:36)    OVERNIGHT EVENTS:  No events overnight    Hospital course:   10/23: presents to ED [facility] with c/o wound drainage since 10/19/2019.   10/24: WILMA overnight.   10/25: WILMA ovenright, pre op for washout revision this am- cancelled  10/26: WILMA overnight. Cherise following for pancytopenia. ID consulted. F/u OR plan for wound revision   10/27:  10/28: s/p exploration and washout of craniotomy wound with plastics closure, +ELIZABETH in place.   10/29: WILMA overnight. Pain controlled. ELIZABETH in place  10/30: WILMA overnight, neuro stable. ELIZABETH drain with high output overnight. Cont cefepime, Vanc.   10/31: Cleared by PT for DC home. Continue ELIZABETH drain on half suction. ID following. ELIZABETH output 27.5cc overnight.  11/1: WILMA overnight, neuro stable. ELIZABETH removed yesterday. on Vanc/Cefepime overnight.   11/2: WILMA overnight, neuro stable.  PICC line in place.    11/3 Pt doing well. Awaiting skin allergy testing.    Vital Signs Last 24 Hrs  T(C): 37.2 (03 Nov 2019 17:52), Max: 37.2 (03 Nov 2019 17:52)  T(F): 98.9 (03 Nov 2019 17:52), Max: 98.9 (03 Nov 2019 17:52)  HR: 97 (03 Nov 2019 17:52) (73 - 97)  BP: 100/68 (03 Nov 2019 17:52) (91/63 - 101/65)  BP(mean): --  RR: 16 (03 Nov 2019 17:52) (16 - 17)  SpO2: 100% (03 Nov 2019 17:52) (95% - 100%)    I&O's Summary    02 Nov 2019 08:01  -  03 Nov 2019 07:00  --------------------------------------------------------  IN: 1780 mL / OUT: 2250 mL / NET: -470 mL    03 Nov 2019 07:01  -  03 Nov 2019 19:49  --------------------------------------------------------  IN: 240 mL / OUT: 400 mL / NET: -160 mL        PHYSICAL EXAM:  Neurological:  Gen: laying in hospital bed, NAD  Neurological: AA+Ox3, OE spont, FC  CN II-XII: PERRL, EOMI   Motor: MAEx4, 5/5 strength throughout  SILT in UE and LE b/l  Cardiovascular:  regular rate and rhythm  Respiratory: clear to auscultation  Incision/Wound: right frontal scalp incision C/D/I    TUBES/LINES:  [] Elliott  [] Lumbar Drain  [] Wound Drains  [] Others      DIET:  [] NPO  [x] Mechanical  [] Tube feeds    LABS:                        8.5    2.75  )-----------( 75       ( 03 Nov 2019 07:22 )             25.9     11-03    139  |  104  |  8   ----------------------------<  114<H>  3.7   |  25  |  0.65    Ca    9.7      03 Nov 2019 07:22  Phos  3.9     11-03  Mg     1.9     11-03              CAPILLARY BLOOD GLUCOSE          Drug Levels: [] N/A  Vancomycin Level, Trough: 18.1 ug/mL (11-02 @ 06:54)  Vancomycin Level, Trough: 58.3 ug/mL (11-01 @ 19:29)  Vancomycin Level, Trough: 13.9 ug/mL (10-30 @ 17:09)    CSF Analysis: [] N/A      Allergies    penicillins (Unknown)  sulfa drugs (Unknown)    Intolerances      MEDICATIONS:  Antibiotics:  vancomycin  IVPB 1250 milliGRAM(s) IV Intermittent every 12 hours    Neuro:  acetaminophen   Tablet .. 650 milliGRAM(s) Oral every 6 hours PRN  HYDROmorphone  Injectable 0.5 milliGRAM(s) IV Push every 4 hours PRN  ondansetron Injectable 4 milliGRAM(s) IV Push every 6 hours PRN  oxycodone    5 mG/acetaminophen 325 mG 1 Tablet(s) Oral every 6 hours PRN    Anticoagulation:    OTHER:  BACItracin   Ointment 1 Application(s) Topical two times a day  chlorhexidine 2% Cloths 1 Application(s) Topical <User Schedule>  filgrastim-sndz Injectable 300 MICROGram(s) SubCutaneous <User Schedule>  influenza   Vaccine 0.5 milliLiter(s) IntraMuscular once  senna 1 Tablet(s) Oral at bedtime    IVF:    CULTURES:  Culture Results:   Testing in progress (10-29 @ 01:40)  Culture Results:   Testing in progress (10-29 @ 01:23)    RADIOLOGY & ADDITIONAL TESTS:      ASSESSMENT:  49y Female with h/o right frontal craniotomy for high grade glioma, s/p chemo/RT   now admitted with wound dehiscence now s/p exploration and washout of right frontal craniotomy wound with plastic surgery closure POD#5 (10/28/19)    WOUND DEHISCENCE  No pertinent family history in first degree relatives  Handoff  MEWS Score  Brain mass  Fibroids  No pertinent past medical history  Wound infection  Wound infection  Wound dehiscence  Postoperative state  Aplastic anemia  Pancytopenia  Preoperative clearance  Brain mass  Suspected deep vein thrombosis (DVT)  Wound dehiscence  Exploration, wound, head  H/O hysterectomy with unilateral oophorectomy  No significant past surgical history  No significant past surgical history  WOUND CHECK  90+      PLAN:  NEURO:  - neuro checks  - vitals checks  - pain control   - regular diet  -allergy testing for PCN  -5 more doses of filgrastim-sndz   - bowel regimen  - Dr. Luong following, recs appreciated, cont neupogen x5 days, now off  - ID following, recs appreciated. For discharge: weekly CBC with diff, CMP, ESR. f/u with Dr. Calixto 12/18 at 10AM:  UPDATE 11/2: Spoke with Dr. Mcnally, for now may stop cefepime and continue Vanc.  Monday will need to speak with medicine team for skin testing (not done by allergy, but done during week by medicine).  - PICC in place  - DVT PROPHYLAXIS: [x] Venodynes  [] Heparin/Lovenox  - PT/OOB  -D/W

## 2019-11-04 ENCOUNTER — TRANSCRIPTION ENCOUNTER (OUTPATIENT)
Age: 49
End: 2019-11-04

## 2019-11-04 VITALS
TEMPERATURE: 98 F | DIASTOLIC BLOOD PRESSURE: 65 MMHG | HEART RATE: 80 BPM | OXYGEN SATURATION: 100 % | SYSTOLIC BLOOD PRESSURE: 93 MMHG | RESPIRATION RATE: 16 BRPM

## 2019-11-04 DIAGNOSIS — T81.49XA INFECTION FOLLOWING A PROCEDURE, OTHER SURGICAL SITE, INITIAL ENCOUNTER: ICD-10-CM

## 2019-11-04 LAB
ANION GAP SERPL CALC-SCNC: 9 MMOL/L — SIGNIFICANT CHANGE UP (ref 5–17)
BUN SERPL-MCNC: 9 MG/DL — SIGNIFICANT CHANGE UP (ref 7–23)
CALCIUM SERPL-MCNC: 9.7 MG/DL — SIGNIFICANT CHANGE UP (ref 8.4–10.5)
CHLORIDE SERPL-SCNC: 105 MMOL/L — SIGNIFICANT CHANGE UP (ref 96–108)
CO2 SERPL-SCNC: 25 MMOL/L — SIGNIFICANT CHANGE UP (ref 22–31)
CREAT SERPL-MCNC: 0.61 MG/DL — SIGNIFICANT CHANGE UP (ref 0.5–1.3)
GLUCOSE SERPL-MCNC: 105 MG/DL — HIGH (ref 70–99)
HCT VFR BLD CALC: 26.8 % — LOW (ref 34.5–45)
HGB BLD-MCNC: 8.8 G/DL — LOW (ref 11.5–15.5)
MCHC RBC-ENTMCNC: 32.8 GM/DL — SIGNIFICANT CHANGE UP (ref 32–36)
MCHC RBC-ENTMCNC: 34.8 PG — HIGH (ref 27–34)
MCV RBC AUTO: 105.9 FL — HIGH (ref 80–100)
NRBC # BLD: 0 /100 WBCS — SIGNIFICANT CHANGE UP (ref 0–0)
PLATELET # BLD AUTO: 62 K/UL — LOW (ref 150–400)
POTASSIUM SERPL-MCNC: 3.5 MMOL/L — SIGNIFICANT CHANGE UP (ref 3.5–5.3)
POTASSIUM SERPL-SCNC: 3.5 MMOL/L — SIGNIFICANT CHANGE UP (ref 3.5–5.3)
RBC # BLD: 2.53 M/UL — LOW (ref 3.8–5.2)
RBC # FLD: 13.1 % — SIGNIFICANT CHANGE UP (ref 10.3–14.5)
SODIUM SERPL-SCNC: 139 MMOL/L — SIGNIFICANT CHANGE UP (ref 135–145)
WBC # BLD: 17.49 K/UL — HIGH (ref 3.8–10.5)
WBC # FLD AUTO: 17.49 K/UL — HIGH (ref 3.8–10.5)

## 2019-11-04 PROCEDURE — 99233 SBSQ HOSP IP/OBS HIGH 50: CPT

## 2019-11-04 PROCEDURE — 99232 SBSQ HOSP IP/OBS MODERATE 35: CPT | Mod: GC

## 2019-11-04 RX ORDER — VANCOMYCIN HCL 1 G
1.25 VIAL (EA) INTRAVENOUS
Qty: 0 | Refills: 0 | DISCHARGE
Start: 2019-11-04

## 2019-11-04 RX ORDER — HYDROMORPHONE HYDROCHLORIDE 2 MG/ML
0.5 INJECTION INTRAMUSCULAR; INTRAVENOUS; SUBCUTANEOUS EVERY 4 HOURS
Refills: 0 | Status: DISCONTINUED | OUTPATIENT
Start: 2019-11-04 | End: 2019-11-04

## 2019-11-04 RX ORDER — BACITRACIN ZINC 500 UNIT/G
1 OINTMENT IN PACKET (EA) TOPICAL
Qty: 0 | Refills: 0 | DISCHARGE
Start: 2019-11-04

## 2019-11-04 RX ORDER — SENNA PLUS 8.6 MG/1
1 TABLET ORAL
Qty: 0 | Refills: 0 | DISCHARGE
Start: 2019-11-04

## 2019-11-04 RX ORDER — ACETAMINOPHEN 500 MG
2 TABLET ORAL
Qty: 0 | Refills: 0 | DISCHARGE
Start: 2019-11-04

## 2019-11-04 RX ORDER — DEXAMETHASONE 0.5 MG/5ML
2 ELIXIR ORAL
Qty: 24 | Refills: 0
Start: 2019-11-04 | End: 2019-11-06

## 2019-11-04 RX ORDER — OXYCODONE AND ACETAMINOPHEN 5; 325 MG/1; MG/1
1 TABLET ORAL EVERY 6 HOURS
Refills: 0 | Status: DISCONTINUED | OUTPATIENT
Start: 2019-11-04 | End: 2019-11-04

## 2019-11-04 RX ORDER — FILGRASTIM 480MCG/1.6
300 VIAL (ML) INJECTION
Qty: 5 | Refills: 0
Start: 2019-11-04 | End: 2019-11-10

## 2019-11-04 RX ADMIN — Medication 166.67 MILLIGRAM(S): at 18:19

## 2019-11-04 RX ADMIN — CHLORHEXIDINE GLUCONATE 1 APPLICATION(S): 213 SOLUTION TOPICAL at 05:27

## 2019-11-04 RX ADMIN — Medication 1 APPLICATION(S): at 05:28

## 2019-11-04 RX ADMIN — Medication 166.67 MILLIGRAM(S): at 06:47

## 2019-11-04 NOTE — PROGRESS NOTE ADULT - PROBLEM SELECTOR PROBLEM 1
Aplastic anemia
Wound dehiscence
Aplastic anemia
Wound infection after surgery
Aplastic anemia
Wound dehiscence

## 2019-11-04 NOTE — DISCHARGE NOTE NURSING/CASE MANAGEMENT/SOCIAL WORK - PATIENT PORTAL LINK FT
You can access the FollowMyHealth Patient Portal offered by [facility] by registering at the following website: [email or web address]. By joining "[employer]’s FollowMyHealth portal, you will also be able to view your health information using other applications (apps) compatible with our system.

## 2019-11-04 NOTE — PROGRESS NOTE ADULT - PROBLEM SELECTOR PLAN 1
secondary to Temodar?    https://www.ncbi.nlm.nih.gov/pmc/articles/LLP2159149/      Pt was started on Neupogen 300 mg QOD with Promacta 50 mg PO daily
continue Neupogen 300 mg QOD and Promacta daily...iron studies
continue Neupogen 300 mg qod and Neulasta...
good response to SQ Neupogen and Plt transfusions...    Plan is to continue Promacta and Neupogen while here...
she is on antibiotics and cultures negative to date follow with ID
she is on antibiotics and cultures negative to date follow with ID she is on antibiotic
she is on antibiotics and cultures negative to date follow with ID she is on antibiotic.  She has PICC line an don vancomycin.  peak level was within the normal range await PCN sensitivity testing
continue Neupogen
iv vancomycin    followup with out patient ID
Surgeries on hold due to the pancytopenia. Patient was seen by infectious disease recommendation is to hold on antibiotics until her cultures are obtained.

## 2019-11-04 NOTE — PROGRESS NOTE ADULT - PROBLEM SELECTOR PLAN 4
Following withdrawal who recommended
The patient is hemodynamically stable.  The pain is controlled.  Patient is on DVT prophylaxis.  Patient is using incentive spirometry.  Oxygen saturation is acceptable.  Advance diet as tolerated.  Advance activity as tolerated.  Monitor for ileus.  Patient is on Laxatives.  Surgical wound is stable.  No indication for monitor bed.

## 2019-11-04 NOTE — PROGRESS NOTE ADULT - ASSESSMENT
LONG discussion with pt and her sister who lives in [place], about the aplastic marrow and need for SQ Neupogen QOD for the next 1 month while being treated for infection, and they understand. ..pt will be taught to give herself SQ Neupogen

## 2019-11-04 NOTE — PROGRESS NOTE ADULT - SUBJECTIVE AND OBJECTIVE BOX
infectious diseases progress note:  BRITTANY CARTER is a 49yFemale patient    WOUND DEHISCENCE    Postoperative state  Aplastic anemia  Pancytopenia  Preoperative clearance  Brain mass  Suspected deep vein thrombosis (DVT)  Wound dehiscence      ROS:  CONSTITUTIONAL:  Negative fever or chills, feels well, good appetite  EYES:  Negative  blurry vision or double vision  CARDIOVASCULAR:  Negative for chest pain or palpitations  RESPIRATORY:  Negative for cough, wheezing, or SOB   GASTROINTESTINAL:  Negative for nausea, vomiting, diarrhea, constipation, or abdominal pain  GENITOURINARY:  Negative frequency, urgency or dysuria  NEUROLOGIC:  No headache, confusion, dizziness, lightheadedness    Allergies    penicillins (Unknown)  sulfa drugs (Unknown)    Intolerances        ANTIBIOTICS/RELEVANT:  antimicrobials  vancomycin  IVPB 1250 milliGRAM(s) IV Intermittent every 12 hours    immunologic:  filgrastim-sndz Injectable 300 MICROGram(s) SubCutaneous <User Schedule>  influenza   Vaccine 0.5 milliLiter(s) IntraMuscular once    OTHER:  acetaminophen   Tablet .. 650 milliGRAM(s) Oral every 6 hours PRN  BACItracin   Ointment 1 Application(s) Topical two times a day  bisacodyl Suppository 10 milliGRAM(s) Rectal daily PRN  chlorhexidine 2% Cloths 1 Application(s) Topical <User Schedule>  HYDROmorphone  Injectable 0.5 milliGRAM(s) IV Push every 4 hours PRN  ondansetron Injectable 4 milliGRAM(s) IV Push every 6 hours PRN  oxycodone    5 mG/acetaminophen 325 mG 1 Tablet(s) Oral every 6 hours PRN  senna 1 Tablet(s) Oral at bedtime  sodium chloride 0.9% lock flush 10 milliLiter(s) IV Push every 1 hour PRN      Objective:  Vital Signs Last 24 Hrs  T(C): 36.6 (04 Nov 2019 08:49), Max: 37.2 (03 Nov 2019 17:52)  T(F): 97.9 (04 Nov 2019 08:49), Max: 98.9 (03 Nov 2019 17:52)  HR: 77 (04 Nov 2019 08:49) (77 - 97)  BP: 90/60 (04 Nov 2019 08:49) (90/60 - 100/68)  BP(mean): --  RR: 18 (04 Nov 2019 08:49) (16 - 18)  SpO2: 98% (04 Nov 2019 08:49) (95% - 100%)    PHYSICAL EXAM:  Constitutional:Well-developed, well nourishe  Eyes:JOSSY, EOMI  Head: dressings on head c/d/i   Ear/Nose/Throat: no oral lesion, no sinus tenderness on percussion	  Neck:no JVD, no lymphadenopathy, supple  Respiratory: CTA layla  Cardiovascular: S1S2 RRR, no murmurs  Gastrointestinal:soft, (+) BS, no HSM  Extremities:no e/e/c        LABS:                        8.8    17.49 )-----------( 62       ( 04 Nov 2019 07:18 )             26.8     11-04    139  |  105  |  9   ----------------------------<  105<H>  3.5   |  25  |  0.61    Ca    9.7      04 Nov 2019 07:18  Phos  3.9     11-03  Mg     1.9     11-03              MICROBIOLOGY:        RADIOLOGY & ADDITIONAL STUDIES:

## 2019-11-04 NOTE — PROGRESS NOTE ADULT - SUBJECTIVE AND OBJECTIVE BOX
Interval Events: Reviewed  Patient seen and examined at bedside.    Patient is a 49y old  Female who presents with a chief complaint of Wound drainage/dehiscence (04 Nov 2019 09:27)  carl donnellyoing well and she wants to go home    PAST MEDICAL & SURGICAL HISTORY:  Brain mass  Fibroids  H/O hysterectomy with unilateral oophorectomy      MEDICATIONS:  Pulmonary:    Antimicrobials:  vancomycin  IVPB 1250 milliGRAM(s) IV Intermittent every 12 hours    Anticoagulants:    Cardiac:      Allergies    penicillins (Unknown)  sulfa drugs (Unknown)    Intolerances        Vital Signs Last 24 Hrs  T(C): 36.3 (04 Nov 2019 13:15), Max: 37.2 (03 Nov 2019 17:52)  T(F): 97.3 (04 Nov 2019 13:15), Max: 98.9 (03 Nov 2019 17:52)  HR: 83 (04 Nov 2019 13:15) (77 - 97)  BP: 90/61 (04 Nov 2019 13:15) (90/60 - 100/68)  BP(mean): --  RR: 17 (04 Nov 2019 13:15) (16 - 18)  SpO2: 98% (04 Nov 2019 13:15) (98% - 100%)    11-03 @ 07:01  -  11-04 @ 07:00  --------------------------------------------------------  IN: 440 mL / OUT: 1050 mL / NET: -610 mL          Review of Systems:   •	General: negative  •	Skin/Breast: negative  •	Ophthalmologic: negative  •	ENMT: negative  •	Respiratory and Thorax: negative  •	Cardiovascular: negative  •	Gastrointestinal: negative  •	Genitourinary: negative  •	Musculoskeletal: negative  •	Neurological: negative  •	Psychiatric: negative  •	Hematology/Lymphatics: negative  •	Endocrine: negative  •	Allergic/Immunologic: negative    Physical Exam:   • Constitutional:	Well-developed, well nourished  • Eyes:	EOMI; PERRL; no drainage or redness  • ENMT:	No oral lesions; no gross abnormalities  • Neck	No bruits; no thyromegaly or nodules  • Breasts:	not examined  • Back:	No deformity or limitation of movement  • Respiratory:	Breath Sounds equal & clear to percussion & auscultation, no accessory muscle use  • Cardiovascular:	Regular rate & rhythm, normal S1, S2; no murmurs, gallops or rubs; no S3, S4  • Gastrointestinal:	Soft, non-tender, no hepatosplenomegaly, normal bowel sounds  • Genitourinary:	not examined  • Rectal: not examined  • Extremities:	No cyanosis, clubbing or edema  • Vascular:	Equal and normal pulses (carotid, femoral, dorsalis pedis)  • Neurologica:l	not examined  • Skin:	No lesions; no rash  • Lymph Nodes:	No lymphadedenopathy  • Musculoskeletal:	No joint pain, swelling or deformity; no limitation of movement        LABS:      CBC Full  -  ( 04 Nov 2019 07:18 )  WBC Count : 17.49 K/uL  RBC Count : 2.53 M/uL  Hemoglobin : 8.8 g/dL  Hematocrit : 26.8 %  Platelet Count - Automated : 62 K/uL  Mean Cell Volume : 105.9 fl  Mean Cell Hemoglobin : 34.8 pg  Mean Cell Hemoglobin Concentration : 32.8 gm/dL  Auto Neutrophil # : x  Auto Lymphocyte # : x  Auto Monocyte # : x  Auto Eosinophil # : x  Auto Basophil # : x  Auto Neutrophil % : x  Auto Lymphocyte % : x  Auto Monocyte % : x  Auto Eosinophil % : x  Auto Basophil % : x    11-04    139  |  105  |  9   ----------------------------<  105<H>  3.5   |  25  |  0.61    Ca    9.7      04 Nov 2019 07:18  Phos  3.9     11-03  Mg     1.9     11-03                          RADIOLOGY & ADDITIONAL STUDIES (The following images were personally reviewed):  Elliott:                                     No  Urine output:                       adequate  DVT prophylaxis:                 Yes  Flattus:                                  Yes  Bowel movement:             yes

## 2019-11-04 NOTE — PROGRESS NOTE ADULT - PROBLEM SELECTOR PLAN 2
Status post resection
Status post resection
Status post resection f
Continue ATB per ID
Status post resection

## 2019-11-04 NOTE — PROGRESS NOTE ADULT - PROVIDER SPECIALTY LIST ADULT
Heme/Onc
Infectious Disease
Internal Medicine
Neurosurgery
Plastic Surgery
Plastic Surgery
Neurosurgery
Infectious Disease
Heme/Onc
Heme/Onc
Internal Medicine

## 2019-11-04 NOTE — PROGRESS NOTE ADULT - REASON FOR ADMISSION
Wound drainage/dehiscence

## 2019-11-04 NOTE — PROGRESS NOTE ADULT - SUBJECTIVE AND OBJECTIVE BOX
HEALTH ISSUES - PROBLEM Dx:  Wound infection after surgery: Wound infection after surgery  Postoperative state: Postoperative state  Aplastic anemia: Aplastic anemia  Pancytopenia: Pancytopenia  Preoperative clearance: Preoperative clearance  Brain mass: Brain mass  Suspected deep vein thrombosis (DVT)  Wound dehiscence: Wound dehiscence          CHEMOTHERAPY REGIMEN:        Day:                          Diet:  Protocol:                                    IVF:      MEDICATIONS  (STANDING):    MEDICATIONS  (PRN):      Allergies    penicillins (Unknown)  sulfa drugs (Unknown)    Intolerances        DVT Prophylaxis: [ ] YES [ ] NO      Antibiotics: [ ] YES [ ] NO    Pain Scale (1-10):       Location:    Vital Signs Last 24 Hrs  T(C): 36.7 (04 Nov 2019 16:20), Max: 36.7 (04 Nov 2019 16:20)  T(F): 98.1 (04 Nov 2019 16:20), Max: 98.1 (04 Nov 2019 16:20)  HR: 80 (04 Nov 2019 16:20) (80 - 83)  BP: 93/65 (04 Nov 2019 16:20) (90/61 - 93/65)  BP(mean): --  RR: 16 (04 Nov 2019 16:20) (16 - 17)  SpO2: 100% (04 Nov 2019 16:20) (98% - 100%)    Drug Dosing Weight  Height (cm): 175.3 (29 Oct 2019 00:20)  Weight (kg): 81.6 (28 Oct 2019 08:55)  BMI (kg/m2): 26.6 (29 Oct 2019 00:20)  BSA (m2): 1.98 (29 Oct 2019 00:20)     Physical Exam:  · Constitutional	detailed exam  · Constitutional Details	well-developed; well-groomed  · Eyes	EOMI; PERRL; no drainage or redness  · ENMT Comments	dry mucous membranes  · Respiratory	detailed exam  · Respiratory Comments	normal breath sounds at the lung bases bilaterally  · Cardiovascular	Regular rate & rhythm, normal S1, S2; no murmurs, gallops or rubs; no S3, S4  · Abd-Soft non tender  ·Ext-no edema, clubbing or cyanosis    URINARY CATHETER: [ ] YES [ ] NO     LABS:  CBC Full  -  ( 04 Nov 2019 07:18 )  WBC Count : 17.49 K/uL  RBC Count : 2.53 M/uL  Hemoglobin : 8.8 g/dL  Hematocrit : 26.8 %  Platelet Count - Automated : 62 K/uL  Mean Cell Volume : 105.9 fl  Mean Cell Hemoglobin : 34.8 pg  Mean Cell Hemoglobin Concentration : 32.8 gm/dL  Auto Neutrophil # : x  Auto Lymphocyte # : x  Auto Monocyte # : x  Auto Eosinophil # : x  Auto Basophil # : x  Auto Neutrophil % : x  Auto Lymphocyte % : x  Auto Monocyte % : x  Auto Eosinophil % : x  Auto Basophil % : x    11-04    139  |  105  |  9   ----------------------------<  105<H>  3.5   |  25  |  0.61    Ca    9.7      04 Nov 2019 07:18            CULTURES:    RADIOLOGY & ADDITIONAL STUDIES:

## 2019-11-04 NOTE — PROGRESS NOTE ADULT - ATTENDING COMMENTS
https://www.ncbi.nlm.nih.gov/pmc/articles/WBV0883924/
Patient seen and examined with house-staff during bedside rounds.  Resident note read, including vitals, physical findings, laboratory data, and radiological reports.   Revisions included below.  Direct personal management at bed side and extensive interpretation of the data.  Plan was outlined and discussed in details with the housestaff.  Decision making of high complexity  Action taken for acute disease activity to reflect the level of care provided:  - medication reconciliation  - review laboratory data

## 2019-11-04 NOTE — PROGRESS NOTE ADULT - PROBLEM SELECTOR PLAN 3
Patient was on hold for surgery until the hematological workup. Patient seen by hematology and await the results of the bone marrow biopsy done in [place]. I agree with TSH one and B12 for dose levels.
Following withdrawal who recommended
Following withdrawal who recommended and improved HB is stable and no indication for transfusion
Following withdrawal who recommended and improved HB is stable and no indication for transfusion.  WBC is elevated and will follow with heme

## 2019-11-04 NOTE — CHART NOTE - NSCHARTNOTEFT_GEN_A_CORE
Admitting Diagnosis:   Patient is a 49y old  Female who presents with a chief complaint of Wound drainage/dehiscence (04 Nov 2019 09:27)    PAST MEDICAL & SURGICAL HISTORY:  Brain mass  Fibroids  H/O hysterectomy with unilateral oophorectomy    Current Nutrition Order: Regular Diet     PO Intake: Good (%) [ x  ]  Fair (50-75%) [   ] Poor (<25%) [   ]    GI Issues: WDL, Last BM 10/3    Pain: Denies pain during assessment    Skin Integrity: Surgical incisions, Jasbir score 20.     Labs:   11-04    139  |  105  |  9   ----------------------------<  105<H>  3.5   |  25  |  0.61    Ca    9.7      04 Nov 2019 07:18  Phos  3.9     11-03  Mg     1.9     11-03    CAPILLARY BLOOD GLUCOSE    Medications:  MEDICATIONS  (STANDING):  BACItracin   Ointment 1 Application(s) Topical two times a day  chlorhexidine 2% Cloths 1 Application(s) Topical <User Schedule>  filgrastim-sndz Injectable 300 MICROGram(s) SubCutaneous <User Schedule>  influenza   Vaccine 0.5 milliLiter(s) IntraMuscular once  senna 1 Tablet(s) Oral at bedtime  vancomycin  IVPB 1250 milliGRAM(s) IV Intermittent every 12 hours    MEDICATIONS  (PRN):  acetaminophen   Tablet .. 650 milliGRAM(s) Oral every 6 hours PRN Temp greater or equal to 38C (100.4F), Mild Pain (1 - 3)  bisacodyl Suppository 10 milliGRAM(s) Rectal daily PRN Constipation  HYDROmorphone  Injectable 0.5 milliGRAM(s) IV Push every 4 hours PRN Severe Pain (7 - 10)  ondansetron Injectable 4 milliGRAM(s) IV Push every 6 hours PRN Nausea and/or Vomiting  oxycodone    5 mG/acetaminophen 325 mG 1 Tablet(s) Oral every 6 hours PRN Moderate Pain (4 - 6)  sodium chloride 0.9% lock flush 10 milliLiter(s) IV Push every 1 hour PRN Pre/post blood products, medications, blood draw, and to maintain line patency    Weight:  Wt: 177.4lbs/ 81.6kg (10/28)    Weight Change: No new weights obtained this admission. Recommend obtain new weight and reweigh weekly. Pt reports decreased appetite from ~February 2019 to August 2019 with subsequent ~30lb unintentional wt loss (~13% unintentional wt loss [id number]). Appetite improved, pt now consumes 3 meals/day, follows healthy diet, no difficulty chewing/swallowing, denies food allergies. Pt reports intentional 20lb wt loss during this time 2/2 healthy dietary/lifestyle changes. Prior to weight loss, patient was started on Decadron and gained 30-40 lbs then it was d/c. After patient started to notice weight loss.     Nutrition Focused Physical Exam: Completed [   ]  Not Pertinent [ x ]    Estimated energy needs:   Ht (10/28 per pt): 175.26cm, Wt (10/28): 81.6kg, IBW: 65.9kg+/-10%, %IBW: 123%, BMI: 26.4   IBW used to calculate energy needs due to pt's current body weight exceeding 120% of IBW. Needs adjusted post-op, hypermetabolic state. Aim for higher end of kcal range.    5962-0315 kcal (25-30 kcal/kg)   65.9-79.09g (1-1.2 g/kg pro)  2634-6181 ml (25-30 ml/kg)    Subjective:   48 yo female s/p Right craniotomy Stereotactic 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 Garcia 2/2019, s/p RTT/Chemo in [place] 5/2019, presents to ED [facility] with c/o wound drainage/dehiscence since 10/19/2019. S/p wound washout 10/25 and Exploration of head wound 10/28. PICC line placed 11/1 with plan to start long term abx. Pt is cleared by PT for d/c. On assessment, pt resting in bed with mother who was at bedside. Currently on regular diet tolerating PO well and denying N/V/Abd pain. Pt consuming % of meals with no complaints.   Re-encouraged pt to consume adequate kcal/protein. Pt appeared amenable to recommendations. Please see below for full nutritional recommendations. RD to monitor and f/u per protocol.    Previous Nutrition Diagnosis: Inadeqate Energy Intake RT inability to meet EER 2/2 NPO for OR AEB pt meeting 0% EER at this time.     Active [   ]  Resolved [  x ]    If resolved, new PES: Increased kcal/protein needs RT wound healing AEB Wound dehiscence s/p washout and exploration.     Goal/Expected Outcome: Pt to meet >75% EER with good tolerance    Recommendations:  1. Continue with regular diet  2. Continue to encourage adequate kcal, lean protein intake.     Education:  Re-encouraged pt to consume adequate kcal/protein.     Risk Level: High [   ] Moderate [ x  ] Low [   ]

## 2019-11-04 NOTE — PROGRESS NOTE ADULT - ASSESSMENT
**Incomplete Note**   49 female, R handed, known to Neurosurgery  s/p Right craniotomy Stereotactic 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 Garcia 2/2019, s/p RTT/Chemo in [place] 5/2019, presents to ED [facility] with c/o wound drainage since 10/19/2019.  S/p OR 10/28 for exploration and washout of craniotomy wound with plastics closure, s/p vanc/ cefepime for post-op ppx.    Recommendations:    -  OR culture growing Staph Capitis and Epidermitis sensitive to oxacillin depth of infection to epidural as per neurosurgery   - pt states she might have allergy to penicllins or sulfa drug  - c/w Vancomycin 1250mg Q12H   - obtain penicillin skin test     ID Team 1 **Incomplete Note**   49 female, R handed, known to Neurosurgery  s/p Right craniotomy Stereotactic 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 Garcia 2/2019, s/p RTT/Chemo in [place] 5/2019, presents to ED [facility] with c/o wound drainage since 10/19/2019.  S/p OR 10/28 for exploration and washout of craniotomy wound with plastics closure, s/p vanc/ cefepime for post-op ppx.    Recommendations:    -  OR culture growing Staph Capitis and Epidermitis sensitive to oxacillin depth of infection to epidural as per neurosurgery   - pt states she might have allergy to penicllins or sulfa drug  - c/w Vancomycin 1250mg Q12H   - obtain penicillin skin test if possible   - weekly CBC w/diff, CMP, ESR, and vanc trough. Follow up with Dr. Calixto in 2 weeks.     ID Team 1 will sign off. Discussed with Dr. Mcnally. 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 Garcia 2/2019, s/p RTT/Chemo in [place] 5/2019, presents to ED [facility] with c/o wound drainage since 10/19/2019.  S/p OR 10/28 for exploration and washout of craniotomy wound with plastics closure, s/p vanc/ cefepime for post-op ppx.    Recommendations:    -  OR culture growing Staph Capitis and Epidermitis sensitive to oxacillin depth of infection to epidural as per neurosurgery   - pt states she might have allergy to penicllins or sulfa drug  - c/w Vancomycin 1250mg Q12H 6 weeks total   - obtain penicillin skin test if possible   - weekly CBC w/diff, CMP, ESR, and vanc trough. Follow up with Dr. Calixto in 2 weeks.     ID Team 1 will sign off. Discussed with Dr. Mcnally.

## 2019-11-04 NOTE — PROGRESS NOTE ADULT - SUBJECTIVE AND OBJECTIVE BOX
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 Garcia 2/2019, s/p RTT/Chemo in [place] 5/2019, presents to ED [facility] with c/o wound drainage since 10/19/2019.  Pt denies sob, cp, chills/fevers, photophobia, neck pain, acute visual changes, acute numbness or weakness. (23 Oct 2019 23:36)    OVERNIGHT EVENTS:  Vital Signs Last 24 Hrs  T(C): 36.3 (04 Nov 2019 05:10), Max: 37.2 (03 Nov 2019 17:52)  T(F): 97.4 (04 Nov 2019 05:10), Max: 98.9 (03 Nov 2019 17:52)  HR: 78 (04 Nov 2019 05:10) (74 - 97)  BP: 91/60 (04 Nov 2019 05:10) (91/60 - 101/65)  BP(mean): --  RR: 17 (04 Nov 2019 05:10) (16 - 17)  SpO2: 100% (04 Nov 2019 05:10) (95% - 100%)    I&O's Summary    02 Nov 2019 08:01  -  03 Nov 2019 07:00  --------------------------------------------------------  IN: 1780 mL / OUT: 2250 mL / NET: -470 mL    03 Nov 2019 07:01  -  04 Nov 2019 06:58  --------------------------------------------------------  IN: 440 mL / OUT: 1050 mL / NET: -610 mL    Hospital course:   10/23: presents to ED [facility] with c/o wound drainage since 10/19/2019.   10/24: WILMA overnight.   10/25: WILMA aleksandra, pre op for washout revision this am- cancelled  10/26: WILMA overnight. Cherise butler for pancytopenia. ID consulted. F/u OR plan for wound revision   10/27:  10/28: s/p exploration and washout of craniotomy wound with plastics closure, +ELIZABETH in place.   10/29: WILMA overnight. Pain controlled. ELIZABETH in place  10/30: WILMA overnight, neuro stable. ELIZABETH drain with high output overnight. Cont cefepime, Vanc.   10/31: Cleared by PT for DC home. Continue ELIZABETH drain on half suction. ID following. ELIZABETH output 27.5cc overnight.  11/1: WILMA overnight, neuro stable. ELIZABETH removed yesterday. on Vanc/Cefepime overnight.   11/2: WILMA overnight, neuro stable.  PICC line in place.    11/3 Pt doing well. Awaiting skin allergy testing.  11/4 uneventful night  WIll follow up with ID in regard to PCN allergy  F/U Plt 75   WBC down to 2.75  on Neupogen QOD  Follow weekly ESR/CRP        PHYSICAL EXAM:  Neurological:      A&OX3 Cranial anerves intact HARMON 5/5  Incision CDI  Sensation: [] intact to light touch  [] decreased:       Cardiovascular:RRR  Respiratory: Lungs CTAB  Gastrointestinal:  +BS  Genitourinary: Voiding without difficulty  Extremities: warm and dry  Incision/Wound:  CDI      DIET: regular    LABS:                        8.5    2.75  )-----------( 75       ( 03 Nov 2019 07:22 )             25.9     11-03    139  |  104  |  8   ----------------------------<  114<H>  3.7   |  25  |  0.65    Ca    9.7      03 Nov 2019 07:22  Phos  3.9     11-03  Mg     1.9     11-03              CAPILLARY BLOOD GLUCOSE      Drug Levels: [] N/A  Vancomycin Level, Trough: 18.2 ug/mL (11-03 @ 19:20)  Vancomycin Level, Trough: 18.1 ug/mL (11-02 @ 06:54)  Vancomycin Level, Trough: 58.3 ug/mL (11-01 @ 19:29)    CSF Analysis: [] N/A      Allergies    penicillins (Unknown)  sulfa drugs (Unknown)    Intolerances      MEDICATIONS:  Antibiotics:  vancomycin  IVPB 1250 milliGRAM(s) IV Intermittent every 12 hours    Neuro:  acetaminophen   Tablet .. 650 milliGRAM(s) Oral every 6 hours PRN  HYDROmorphone  Injectable 0.5 milliGRAM(s) IV Push every 4 hours PRN  ondansetron Injectable 4 milliGRAM(s) IV Push every 6 hours PRN  oxycodone    5 mG/acetaminophen 325 mG 1 Tablet(s) Oral every 6 hours PRN    Anticoagulation:    OTHER:  BACItracin   Ointment 1 Application(s) Topical two times a day  bisacodyl Suppository 10 milliGRAM(s) Rectal daily PRN  chlorhexidine 2% Cloths 1 Application(s) Topical <User Schedule>  filgrastim-sndz Injectable 300 MICROGram(s) SubCutaneous <User Schedule>  influenza   Vaccine 0.5 milliLiter(s) IntraMuscular once  senna 1 Tablet(s) Oral at bedtime    IVF:    CULTURES:  Culture Results:   Testing in progress (10-29 @ 01:40)  Culture Results:   Testing in progress (10-29 @ 01:23)    RADIOLOGY & ADDITIONAL TESTS:      ASSESSMENT:  49y Female s/p 10/28  · Operative Findings	Exploration of the craniotomy wound, debridement and closure	    s/p PICC   s/p Right craniotomy Stereotactic 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 Garcia 2/2019, s/p RTT/Chemo in [place] 5/2019, presents to ED [facility] with c/o wound drainage since 10/19/2019.  S/p OR 10/28 for exploration and washout of craniotomy wound with plastics closure, s/p vanc/ cefepime for post-op ppx.    Recommendations:    -  OR culture growing Staph Capitis and Epidermitis sensitive to oxacillin depth of infection to epidural as per neurosurgery  - d/c Vanc/Zosyn and start Oxacillin 2g Q6H for a total of 6 weeks from OR  - weekly CBC w/ diff, CMP, ESR and follow up with Dr. Mj Calixto as outpatient on Dec 18th at 10am at [address] office.       PLAN:  NEURO:    Monitor neuro status  OT/PT  F/U with AM labs  F/U ID recommendations  Continue current medical regime    Dispo: Discussed with attending           DVT PROPHYLAXIS:  [x] Venodynes                                [] Heparin/Lovenox    FALL RISK:  [] Low Risk                                    [] Impulsive  Assessment:  Present when checked    []  GCS  E   V  M     Heart Failure: []Acute, [] acute on chronic , []chronic  Heart Failure:  [] Diastolic (HFpEF), [] Systolic (HFrEF), []Combined (HFpEF and HFrEF), [] RHF, [] Pulm HTN, [] Other    [] IRMA, [] ATN, [] AIN, [] other  [] CKD1, [] CKD2, [] CKD 3, [] CKD 4, [] CKD 5, []ESRD    Encephalopathy: [] Metabolic, [] Hepatic, [] toxic, [] Neurological, [] Other    Abnormal Nurtitional Status: [] malnurtition (see nutrition note), [ ]underweight: BMI < 19, [] morbid obesity: BMI >40, [] Cachexia    [] Sepsis  [] hypovolemic shock,[] cardiogenic shock, [] hemorrhagic shock, [] neuogenic shock  [] Acute Respiratory Failure  []Cerebral edema, [] Brain compression/ herniation,   [] Functional quadriplegia  [] Acute blood loss anemia

## 2019-11-05 ENCOUNTER — INBOUND DOCUMENT (OUTPATIENT)
Age: 49
End: 2019-11-05

## 2019-11-07 PROCEDURE — 87070 CULTURE OTHR SPECIMN AEROBIC: CPT

## 2019-11-07 PROCEDURE — 87075 CULTR BACTERIA EXCEPT BLOOD: CPT

## 2019-11-07 PROCEDURE — 84443 ASSAY THYROID STIM HORMONE: CPT

## 2019-11-07 PROCEDURE — 83540 ASSAY OF IRON: CPT

## 2019-11-07 PROCEDURE — 86140 C-REACTIVE PROTEIN: CPT

## 2019-11-07 PROCEDURE — 87116 MYCOBACTERIA CULTURE: CPT

## 2019-11-07 PROCEDURE — 86901 BLOOD TYPING SEROLOGIC RH(D): CPT

## 2019-11-07 PROCEDURE — 71045 X-RAY EXAM CHEST 1 VIEW: CPT

## 2019-11-07 PROCEDURE — 88304 TISSUE EXAM BY PATHOLOGIST: CPT

## 2019-11-07 PROCEDURE — 86900 BLOOD TYPING SEROLOGIC ABO: CPT

## 2019-11-07 PROCEDURE — 70460 CT HEAD/BRAIN W/DYE: CPT

## 2019-11-07 PROCEDURE — 93970 EXTREMITY STUDY: CPT

## 2019-11-07 PROCEDURE — 70450 CT HEAD/BRAIN W/O DYE: CPT

## 2019-11-07 PROCEDURE — 36430 TRANSFUSION BLD/BLD COMPNT: CPT

## 2019-11-07 PROCEDURE — P9035: CPT

## 2019-11-07 PROCEDURE — 70553 MRI BRAIN STEM W/O & W/DYE: CPT

## 2019-11-07 PROCEDURE — 87206 SMEAR FLUORESCENT/ACID STAI: CPT

## 2019-11-07 PROCEDURE — 93005 ELECTROCARDIOGRAM TRACING: CPT

## 2019-11-07 PROCEDURE — 82962 GLUCOSE BLOOD TEST: CPT

## 2019-11-07 PROCEDURE — C1713: CPT

## 2019-11-07 PROCEDURE — 85610 PROTHROMBIN TIME: CPT

## 2019-11-07 PROCEDURE — 80053 COMPREHEN METABOLIC PANEL: CPT

## 2019-11-07 PROCEDURE — 85730 THROMBOPLASTIN TIME PARTIAL: CPT

## 2019-11-07 PROCEDURE — A9585: CPT

## 2019-11-07 PROCEDURE — 85652 RBC SED RATE AUTOMATED: CPT

## 2019-11-07 PROCEDURE — 87040 BLOOD CULTURE FOR BACTERIA: CPT

## 2019-11-07 PROCEDURE — 82746 ASSAY OF FOLIC ACID SERUM: CPT

## 2019-11-07 PROCEDURE — 86850 RBC ANTIBODY SCREEN: CPT

## 2019-11-07 PROCEDURE — 36573 INSJ PICC RS&I 5 YR+: CPT

## 2019-11-07 PROCEDURE — 82728 ASSAY OF FERRITIN: CPT

## 2019-11-07 PROCEDURE — 82607 VITAMIN B-12: CPT

## 2019-11-07 PROCEDURE — 80202 ASSAY OF VANCOMYCIN: CPT

## 2019-11-07 PROCEDURE — 83036 HEMOGLOBIN GLYCOSYLATED A1C: CPT

## 2019-11-07 PROCEDURE — 99285 EMERGENCY DEPT VISIT HI MDM: CPT | Mod: 25

## 2019-11-07 PROCEDURE — 73502 X-RAY EXAM HIP UNI 2-3 VIEWS: CPT

## 2019-11-07 PROCEDURE — 87102 FUNGUS ISOLATION CULTURE: CPT

## 2019-11-07 PROCEDURE — 87641 MR-STAPH DNA AMP PROBE: CPT

## 2019-11-07 PROCEDURE — 87086 URINE CULTURE/COLONY COUNT: CPT

## 2019-11-07 PROCEDURE — 85027 COMPLETE CBC AUTOMATED: CPT

## 2019-11-07 PROCEDURE — 84100 ASSAY OF PHOSPHORUS: CPT

## 2019-11-07 PROCEDURE — 83550 IRON BINDING TEST: CPT

## 2019-11-07 PROCEDURE — 80048 BASIC METABOLIC PNL TOTAL CA: CPT

## 2019-11-07 PROCEDURE — C1889: CPT

## 2019-11-07 PROCEDURE — 87186 SC STD MICRODIL/AGAR DIL: CPT

## 2019-11-07 PROCEDURE — 85025 COMPLETE CBC W/AUTO DIFF WBC: CPT

## 2019-11-07 PROCEDURE — 36415 COLL VENOUS BLD VENIPUNCTURE: CPT

## 2019-11-07 PROCEDURE — 81025 URINE PREGNANCY TEST: CPT

## 2019-11-07 PROCEDURE — 83735 ASSAY OF MAGNESIUM: CPT

## 2019-11-07 PROCEDURE — 84466 ASSAY OF TRANSFERRIN: CPT

## 2019-11-11 ENCOUNTER — APPOINTMENT (OUTPATIENT)
Dept: NEUROSURGERY | Facility: CLINIC | Age: 49
End: 2019-11-11
Payer: COMMERCIAL

## 2019-11-11 VITALS
SYSTOLIC BLOOD PRESSURE: 107 MMHG | TEMPERATURE: 98.1 F | HEART RATE: 80 BPM | RESPIRATION RATE: 18 BRPM | WEIGHT: 183 LBS | DIASTOLIC BLOOD PRESSURE: 73 MMHG | BODY MASS INDEX: 27.11 KG/M2 | HEIGHT: 69 IN | OXYGEN SATURATION: 100 %

## 2019-11-11 PROCEDURE — 99024 POSTOP FOLLOW-UP VISIT: CPT

## 2019-11-11 RX ORDER — RISPERIDONE 1 MG/1
1 TABLET, FILM COATED ORAL
Qty: 30 | Refills: 0 | Status: DISCONTINUED | COMMUNITY
End: 2019-11-11

## 2019-11-11 RX ORDER — DEXAMETHASONE 2 MG/1
2 TABLET ORAL TWICE DAILY
Qty: 60 | Refills: 0 | Status: DISCONTINUED | COMMUNITY
Start: 2019-02-11 | End: 2019-11-11

## 2019-11-11 RX ORDER — PANTOPRAZOLE 40 MG/1
40 TABLET, DELAYED RELEASE ORAL DAILY
Qty: 30 | Refills: 1 | Status: DISCONTINUED | COMMUNITY
Start: 2019-02-19 | End: 2019-11-11

## 2019-11-11 NOTE — ASSESSMENT
[FreeTextEntry1] : Shower daily. Wash hair with mild shampoo, i.e. Cordell and Cordell.  Pat incision line dry with dry separate towel.\par Report all s/s infection including drainage, redness, warmth, fever, weakness, chills.\par No tub baths/pools for 8 weeks.\par Keep incision covered and protected from sun for 3-6 months following surgery.\par Follow up with Dr. Calixto on 11/20/19.\par Follow up with neuro[place]cology [place] [facility], Dr. Yuliet Patel for management of pancytopenia.\par Follow up with plastic surgery for management of postoperative incision - Dr. Jennifer Anaya. Office contact information given to patient. Patient and patient's sister will call to schedule appointment.\par \par Patient verbalizes agreement and understanding with plan.\par

## 2019-11-11 NOTE — PHYSICAL EXAM
[General Appearance - Alert] : alert [General Appearance - In No Acute Distress] : in no acute distress [Clean] : clean [Dry] : dry [Intact] : intact [No Drainage] : without drainage [Normal Skin] : normal [Oriented To Time, Place, And Person] : oriented to person, place, and time [Motor Tone] : muscle tone was normal in all four extremities [Motor Strength] : muscle strength was normal in all four extremities [Sclera] : the sclera and conjunctiva were normal [Outer Ear] : the ears and nose were normal in appearance [] : no respiratory distress [Neck Appearance] : the appearance of the neck was normal [Respiration, Rhythm And Depth] : normal respiratory rhythm and effort [Abnormal Walk] : normal gait [Heart Rate And Rhythm] : heart rate was normal and rhythm regular [Skin Color & Pigmentation] : normal skin color and pigmentation [FreeTextEntry1] : RIGHT frontal

## 2019-11-11 NOTE — HISTORY OF PRESENT ILLNESS
[FreeTextEntry1] : 49 year old woman who presented with severe headaches in January 2018 and workup demonstrated brain mass.\par \par She underwent biopsy of brain mass on 2/1/19 and pathology indicated high grade glioma.\par \par She was undergoing chemotherapy  closer to her family in [place]. Her last treatment was in March 2019. She returned to [place] after this treatment and has since been followed at [facility] with Dr. Yuliet Patel.\par \par She presented to [facility] ED on 10/23/19 with purulent drainage and dehiscence from previous right frontal cranial incision site. 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

## 2019-11-11 NOTE — REASON FOR VISIT
[Family Member] : family member [de-identified] : Right frontal craniotomy for evacuation of intracranial/epidural abscess, cranial wound debridement [de-identified] : 10/28/19 [de-identified] : Reports she is doing well.\par She comes to the appointment with her sister.\par Denies any signs of postop wound infection which could include but is not limited to redness/swelling/purulent drainage\par Denies CP/SOB/unilateral leg edema. Denies headaches, nausea, vomiting, dizziness, weakness, changes in vision/speech.\par She is slowly introducing preop activities.\par \par She is currently on IV vancomycin bid. \par

## 2019-11-12 DIAGNOSIS — B95.7 OTHER STAPHYLOCOCCUS AS THE CAUSE OF DISEASES CLASSIFIED ELSEWHERE: ICD-10-CM

## 2019-11-12 DIAGNOSIS — Z92.3 PERSONAL HISTORY OF IRRADIATION: ICD-10-CM

## 2019-11-12 DIAGNOSIS — G06.0 INTRACRANIAL ABSCESS AND GRANULOMA: ICD-10-CM

## 2019-11-12 DIAGNOSIS — T81.30XA DISRUPTION OF WOUND, UNSPECIFIED, INITIAL ENCOUNTER: ICD-10-CM

## 2019-11-12 DIAGNOSIS — T81.43XA INFECTION FOLLOWING A PROCEDURE, ORGAN AND SPACE SURGICAL SITE, INITIAL ENCOUNTER: ICD-10-CM

## 2019-11-12 DIAGNOSIS — T81.31XA DISRUPTION OF EXTERNAL OPERATION (SURGICAL) WOUND, NOT ELSEWHERE CLASSIFIED, INITIAL ENCOUNTER: ICD-10-CM
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
## 2019-11-12 DIAGNOSIS — Z88.2 ALLERGY STATUS TO SULFONAMIDES: ICD-10-CM

## 2019-11-12 DIAGNOSIS — Z88.0 ALLERGY STATUS TO PENICILLIN: ICD-10-CM

## 2019-11-12 DIAGNOSIS — Z92.21 PERSONAL HISTORY OF ANTINEOPLASTIC CHEMOTHERAPY: ICD-10-CM

## 2019-11-12 DIAGNOSIS — D61.810 ANTINEOPLASTIC CHEMOTHERAPY INDUCED PANCYTOPENIA: ICD-10-CM

## 2019-11-12 DIAGNOSIS — Z85.841 PERSONAL HISTORY OF MALIGNANT NEOPLASM OF BRAIN: ICD-10-CM

## 2019-11-19 ENCOUNTER — APPOINTMENT (OUTPATIENT)
Dept: HEMATOLOGY ONCOLOGY | Facility: CLINIC | Age: 49
End: 2019-11-19
Payer: COMMERCIAL

## 2019-11-19 ENCOUNTER — LABORATORY RESULT (OUTPATIENT)
Age: 49
End: 2019-11-19

## 2019-11-19 VITALS
HEART RATE: 75 BPM | BODY MASS INDEX: 26.36 KG/M2 | OXYGEN SATURATION: 100 % | TEMPERATURE: 97.3 F | HEIGHT: 69 IN | RESPIRATION RATE: 16 BRPM | SYSTOLIC BLOOD PRESSURE: 102 MMHG | DIASTOLIC BLOOD PRESSURE: 69 MMHG | WEIGHT: 178 LBS

## 2019-11-19 DIAGNOSIS — D61.818 OTHER PANCYTOPENIA: ICD-10-CM

## 2019-11-19 PROCEDURE — 99214 OFFICE O/P EST MOD 30 MIN: CPT | Mod: 25

## 2019-11-19 PROCEDURE — 36415 COLL VENOUS BLD VENIPUNCTURE: CPT

## 2019-11-19 RX ORDER — PANTOPRAZOLE 40 MG/1
40 TABLET, DELAYED RELEASE ORAL DAILY
Qty: 30 | Refills: 0 | Status: COMPLETED | COMMUNITY
End: 2019-11-19

## 2019-11-19 RX ORDER — DOCUSATE SODIUM 100 MG/1
100 CAPSULE ORAL
Refills: 0 | Status: COMPLETED | COMMUNITY
End: 2019-11-19

## 2019-11-20 ENCOUNTER — APPOINTMENT (OUTPATIENT)
Dept: INFECTIOUS DISEASE | Facility: CLINIC | Age: 49
End: 2019-11-20
Payer: COMMERCIAL

## 2019-11-20 VITALS
RESPIRATION RATE: 15 BRPM | HEART RATE: 71 BPM | WEIGHT: 178 LBS | BODY MASS INDEX: 26.36 KG/M2 | OXYGEN SATURATION: 98 % | HEIGHT: 69 IN | TEMPERATURE: 97.4 F | DIASTOLIC BLOOD PRESSURE: 71 MMHG | SYSTOLIC BLOOD PRESSURE: 103 MMHG

## 2019-11-20 DIAGNOSIS — E55.9 VITAMIN D DEFICIENCY, UNSPECIFIED: ICD-10-CM

## 2019-11-20 PROCEDURE — 99214 OFFICE O/P EST MOD 30 MIN: CPT

## 2019-11-20 NOTE — PHYSICAL EXAM
[General Appearance - Alert] : alert [General Appearance - In No Acute Distress] : in no acute distress [Outer Ear] : the ears and nose were normal in appearance [Sclera] : the sclera and conjunctiva were normal [Neck Appearance] : the appearance of the neck was normal [Respiration, Rhythm And Depth] : normal respiratory rhythm and effort [Heart Rate And Rhythm] : heart rate was normal and rhythm regular [Bowel Sounds] : normal bowel sounds [Edema] : there was no peripheral edema [Heart Sounds] : normal S1 and S2 [Abdomen Soft] : soft [No Palpable Adenopathy] : no palpable adenopathy [] : no rash [Motor Exam] : the motor exam was normal [Musculoskeletal - Swelling] : no joint swelling [No Focal Deficits] : no focal deficits [Oriented To Time, Place, And Person] : oriented to person, place, and time [FreeTextEntry1] : scalp surgical scar is clean without erythema or active drainage

## 2019-11-20 NOTE — HISTORY OF PRESENT ILLNESS
[FreeTextEntry1] : 49 year old female s/p Right craniotomy Stereotactic 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 Garcia 2/2019, s/p RTT/Chemo in [place] 5/2019, presented to [facility] ED 10/23 with c/o wound drainage since 10/19/2019.  On 10/28, she underwent exploration and washout of craniotomy wound with plastics closure.  She was given vanc/cefepime for post-op ppx. OR cultures grew Staph Capitis and Epidermitis.   Per neurosurgery, depth of infection to epidural.  She continues on vancomycin 1250 mg q 12 h x 6 weeks. Tentative end date is 12/8.  She has fatigue related to antibiotics.  She otherwise feel swell and offers no symptoms at today's visit.  Has f/u with Dr. Oates 12/9.

## 2019-11-21 LAB
25(OH)D3 SERPL-MCNC: 14.9 NG/ML
BASOPHILS # BLD AUTO: 0.02 K/UL
BASOPHILS NFR BLD AUTO: 1 %
EOSINOPHIL # BLD AUTO: 0.03 K/UL
EOSINOPHIL NFR BLD AUTO: 1.4 %
ERYTHROCYTE [SEDIMENTATION RATE] IN BLOOD BY WESTERGREN METHOD: 25 MM/HR
FERRITIN SERPL-MCNC: 773 NG/ML
HAPTOGLOB SERPL-MCNC: 141 MG/DL
HCT VFR BLD CALC: 30.1 %
HGB BLD-MCNC: 9.9 G/DL
IMM GRANULOCYTES NFR BLD AUTO: 1 %
IRON SATN MFR SERPL: 23 %
IRON SERPL-MCNC: 52 UG/DL
LDH SERPL-CCNC: 132 U/L
LYMPHOCYTES # BLD AUTO: 0.76 K/UL
LYMPHOCYTES NFR BLD AUTO: 36.2 %
MAN DIFF?: NORMAL
MCHC RBC-ENTMCNC: 32.9 GM/DL
MCHC RBC-ENTMCNC: 35.6 PG
MCV RBC AUTO: 108.3 FL
MONOCYTES # BLD AUTO: 0.19 K/UL
MONOCYTES NFR BLD AUTO: 9 %
NEUTROPHILS # BLD AUTO: 1.08 K/UL
NEUTROPHILS NFR BLD AUTO: 51.4 %
PLATELET # BLD AUTO: 66 K/UL
RBC # BLD: 2.78 M/UL
RBC # FLD: 14.1 %
TIBC SERPL-MCNC: 228 UG/DL
UIBC SERPL-MCNC: 176 UG/DL
VIT B12 SERPL-MCNC: 999 PG/ML
WBC # FLD AUTO: 2.1 K/UL

## 2019-11-21 NOTE — HISTORY OF PRESENT ILLNESS
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 During her postop care she was treated with subcu Neupogen and oral Promacta... She was not able to get her Neupogen as she left  the hospital.\par \par Patient self administers antibiotics and has blood work done weekly monitored by physician name not known.

## 2019-11-21 NOTE — ASSESSMENT
[FreeTextEntry1] : Repeat blood work as above... Patient continues to have pancytopenia with an ANC of about thousand cells.... This was discussed with ID consultant Dr. Calixto and since patient's infection is controlled he sees no reason to start patient on subcu Neupogen...\par \par \par Of note patient's vitamin D is low which she can benefit from replacement.\par

## 2019-11-21 NOTE — CONSULT LETTER
[Dear  ___] : Dear  [unfilled], [Consult Letter:] : I had the pleasure of evaluating your patient, [unfilled]. [Please see my note below.] : Please see my note below. [Consult Closing:] : Thank you very much for allowing me to participate in the care of this patient.  If you have any questions, please do not hesitate to contact me. [Sincerely,] : Sincerely, [FreeTextEntry3] : Kellee Luong MD\par

## 2019-11-27 LAB
CULTURE RESULTS: SIGNIFICANT CHANGE UP
SPECIMEN SOURCE: SIGNIFICANT CHANGE UP

## 2019-12-09 ENCOUNTER — OUTPATIENT (OUTPATIENT)
Dept: OUTPATIENT SERVICES | Facility: HOSPITAL | Age: 49
LOS: 1 days | End: 2019-12-09
Payer: COMMERCIAL

## 2019-12-09 ENCOUNTER — APPOINTMENT (OUTPATIENT)
Dept: NEUROSURGERY | Facility: CLINIC | Age: 49
End: 2019-12-09
Payer: COMMERCIAL

## 2019-12-09 VITALS
DIASTOLIC BLOOD PRESSURE: 78 MMHG | SYSTOLIC BLOOD PRESSURE: 116 MMHG | OXYGEN SATURATION: 98 % | RESPIRATION RATE: 18 BRPM | WEIGHT: 180 LBS | HEIGHT: 69 IN | BODY MASS INDEX: 26.66 KG/M2 | HEART RATE: 75 BPM

## 2019-12-09 DIAGNOSIS — Z90.710 ACQUIRED ABSENCE OF BOTH CERVIX AND UTERUS: Chronic | ICD-10-CM

## 2019-12-09 PROCEDURE — 99024 POSTOP FOLLOW-UP VISIT: CPT

## 2019-12-09 PROCEDURE — 87070 CULTURE OTHR SPECIMN AEROBIC: CPT

## 2019-12-09 PROCEDURE — 87075 CULTR BACTERIA EXCEPT BLOOD: CPT

## 2019-12-10 LAB — GRAM STN FLD: SIGNIFICANT CHANGE UP

## 2019-12-11 ENCOUNTER — EMERGENCY (EMERGENCY)
Facility: HOSPITAL | Age: 49
LOS: 1 days | Discharge: HOME | End: 2019-12-11
Attending: EMERGENCY MEDICINE | Admitting: EMERGENCY MEDICINE
Payer: COMMERCIAL

## 2019-12-11 VITALS
RESPIRATION RATE: 18 BRPM | SYSTOLIC BLOOD PRESSURE: 113 MMHG | HEIGHT: 69 IN | DIASTOLIC BLOOD PRESSURE: 77 MMHG | OXYGEN SATURATION: 100 % | WEIGHT: 179.9 LBS | HEART RATE: 82 BPM | TEMPERATURE: 98 F

## 2019-12-11 VITALS
OXYGEN SATURATION: 98 % | SYSTOLIC BLOOD PRESSURE: 122 MMHG | RESPIRATION RATE: 18 BRPM | TEMPERATURE: 97 F | HEART RATE: 76 BPM | DIASTOLIC BLOOD PRESSURE: 74 MMHG

## 2019-12-11 DIAGNOSIS — Z90.710 ACQUIRED ABSENCE OF BOTH CERVIX AND UTERUS: Chronic | ICD-10-CM

## 2019-12-11 PROCEDURE — 93971 EXTREMITY STUDY: CPT | Mod: 26,LT

## 2019-12-11 PROCEDURE — 99284 EMERGENCY DEPT VISIT MOD MDM: CPT | Mod: 25

## 2019-12-11 PROCEDURE — 99282 EMERGENCY DEPT VISIT SF MDM: CPT

## 2019-12-11 PROCEDURE — 93971 EXTREMITY STUDY: CPT

## 2019-12-11 PROCEDURE — 99284 EMERGENCY DEPT VISIT MOD MDM: CPT

## 2019-12-11 NOTE — ED PROVIDER NOTE - NOTES
PICC is not working and there is no evidence of clot. Agreed to remove PICC and he will see patient in clinic tomorrow.

## 2019-12-11 NOTE — HISTORY OF PRESENT ILLNESS
[FreeTextEntry1] : 49 year old woman who presented with severe headaches in January 2018 and workup demonstrated brain mass.\par \par She underwent biopsy of brain mass on 2/1/19 and pathology indicated high grade glioma.\par \par She was undergoing chemotherapy  closer to her family in [place]. Her last treatment was in March 2019. She returned to [place] after this treatment and has since been followed at [facility] with [place]. Yuliet Patel.\par \par She presented to [facility] ED on 10/23/19 with purulent drainage and dehiscence from previous right frontal cranial incision site. 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

## 2019-12-11 NOTE — PHYSICAL EXAM
[General Appearance - Alert] : alert [General Appearance - Well-Appearing] : healthy appearing [Purulent] : exhibiting purulent drainage [Transverse] : transverse [Mid-Portion] : mid-portion [Oriented To Time, Place, And Person] : oriented to person, place, and time [Sensation Tactile Decrease] : light touch was intact [Sensation Pain / Temperature Decrease] : pain and temperature was intact [Balance] : balance was intact [Sclera] : the sclera and conjunctiva were normal [Neck Appearance] : the appearance of the neck was normal [Outer Ear] : the ears and nose were normal in appearance [] : no respiratory distress [Abnormal Walk] : normal gait [Skin Color & Pigmentation] : normal skin color and pigmentation [FreeTextEntry1] : RIGHT frontal cranium

## 2019-12-11 NOTE — ED ADULT NURSE NOTE - OBJECTIVE STATEMENT
Patient presents to the ED because her PICC line will not flush . Denies any other complaints. no redness/swelling/discharge.

## 2019-12-11 NOTE — REASON FOR VISIT
[de-identified] : Right frontal craniotomy for evacuation of intracranial/epidural abscess, cranial wound debridement [de-identified] : 10/28/19 [de-identified] : \par TODAY'S VISIT:\jose Returns today for follow up. She has an area of scant purulent drainage coming from an unhealed area of her RIGHT frontal cranial surgical site. She completed IV Vanc yesterday, PICC line remains in place. She denies h/a, n/v, fever and generalized weakness.\par \par PREVIOUS OFFICE VISIT 11/11/19:\par Reports she is doing well.\par She comes to the appointment with her sister.\par Denies any signs of postop wound infection which could include but is not limited to redness/swelling/purulent drainage\par Denies CP/SOB/unilateral leg edema. Denies headaches, nausea, vomiting, dizziness, weakness, changes in vision/speech.\par She is slowly introducing preop activities.\par \par She is currently on IV vancomycin bid. \par

## 2019-12-11 NOTE — ED PROVIDER NOTE - CHPI ED SYMPTOMS NEG
no vomiting/no weakness/no dizziness/no fever/no chills/no numbness/no nausea/no pain/no tingling/no decreased eating/drinking

## 2019-12-11 NOTE — ED PROVIDER NOTE - OBJECTIVE STATEMENT
PICC placed by IR on 11/1  s/p Right craniotomy Stereotactic 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 Garcia 2/2019, s/p RTT/Chemo in [place] 5/2019, presents to ED [facility] with c/o wound drainage since 10/19/2019.  S/p OR 10/28 for exploration and washout of craniotomy wound with plastics closure, s/p vanc/ cefepime for post-op ppx.  -  OR culture growing Staph Capitis and Epidermitis sensitive to oxacillin depth of infection to epidural as per neurosurgery 49F with aplastic anemia, s/p right craniotomy Stereotactic 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 Garcia 2/2019, s/p RTT/Chemo in [place] 5/2019, c/b wound infection s/p 10/28 for exploration and washout of craniotomy wound with plastics closure, receiving Vancomycin BID through L PICC line which was inserted on 11/1/19, here for evaluation of clogged PICC line. She last infused vancomycin yesterday morning and was unable to in the evening and this morning. VNS came to the house and was unable to aspirate or flush the line.    ***OR culture growing Staph Capitis and Epidermitis sensitive to oxacillin depth of infection to epidural as per neurosurgery***

## 2019-12-11 NOTE — ED PROVIDER NOTE - PATIENT PORTAL LINK FT
You can access the FollowMyHealth Patient Portal offered by [facility] by registering at the following website: [email or web address]. By joining [facility]’s FollowMyHealth portal, you will also be able to view your health information using other applications (apps) compatible with our system.

## 2019-12-11 NOTE — ED PROVIDER NOTE - CLINICAL SUMMARY MEDICAL DECISION MAKING FREE TEXT BOX
49F with aplastic anemia, s/p right craniotomy Stereotactic 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 Garcia 2/2019, s/p RTT/Chemo in [place] 5/2019, c/b wound infection s/p 10/28 for exploration and washout of craniotomy wound with plastics closure, receiving Vancomycin BID through L PICC line which was inserted on 11/1/19, here for evaluation of clogged PICC line. Unable to aspirate or flush line. US negative for thrombus.

## 2019-12-11 NOTE — ED PROVIDER NOTE - ATTENDING CONTRIBUTION TO CARE
49 F here for eval of nonfcnal picc- had R frontal tumor removed- cx were negative but px on vanco  via picc bid- last dose was yesterday morning- no swelling/pain  to arm  vs noted  s1s2 lungs cta bl  abd soft nt nd +bs  L arm picc dressing intact   no redness/fluctuance/swelling  IMP- Nonfcnal picc  check line ro vte  d/w ID re neccessity of vanc

## 2019-12-11 NOTE — CONSULT NOTE ADULT - SUBJECTIVE AND OBJECTIVE BOX
HPI"  49F with aplastic anemia, s/p right craniotomy Stereotactic 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 Garcia 2/2019, s/p RTT/Chemo in [place] 5/2019, c/b wound infection s/p 10/28 for exploration and washout of craniotomy wound with plastics closure, receiving Vancomycin BID through L PICC line which was inserted on 11/1/19, here for evaluation of clogged PICC line. She last infused vancomycin yesterday morning and was unable to in the evening and this morning. VNS came to the house and was unable to aspirate or flush the line.    	***OR culture growing Staph Capitis and Epidermitis sensitive to oxacillin depth of infection to epidural as per neurosurgery***      Pt was seen and examined at the bedside.  Pt states she was sent to ED by Dr. Calixto due to malfunction of PICC line which was placed for Vancomyc until 12/13/2019.  Pt was seen by Dr. Anaya ( Plastics) 12/10/2019) and was advised for weekly follow ups for Right frontal scalp wound check.  Pt denies chills/fevers, wound drainage, worsening headache, any neurological changes, photophobia.  At this time pt is refusing blood draw in ED,    ICU Vital Signs Last 24 Hrs  T(C): 36.6 (11 Dec 2019 21:27), Max: 36.6 (11 Dec 2019 21:27)  T(F): 97.8 (11 Dec 2019 21:27), Max: 97.8 (11 Dec 2019 21:27)  HR: 61 (11 Dec 2019 21:27) (61 - 82)  BP: 101/67 (11 Dec 2019 21:27) (101/67 - 113/77)  BP(mean): --  ABP: --  ABP(mean): --  RR: 18 (11 Dec 2019 21:27) (18 - 18)  SpO2: 98% (11 Dec 2019 21:27) (98% - 100%)      Exam:  AA&Ox3, clear coherent speech  CN II-XII: PERRL, EOMI   Motor: MAEx4, 5/5 strength throughout, no drift,  SILT in UE and LE b/l  Cardiovascular:  regular rate and rhythm  Respiratory: clear to auscultation    Reommendations: HPI"  49F with aplastic anemia, s/p right craniotomy Stereotactic 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 Garcia 2/2019, s/p RTT/Chemo in [place] 5/2019, c/b wound infection s/p 10/28 for exploration and washout of craniotomy wound with plastics closure, receiving Vancomycin BID through L PICC line which was inserted on 11/1/19, here for evaluation of clogged PICC line. She last infused vancomycin yesterday morning and was unable to in the evening and this morning. VNS came to the house and was unable to aspirate or flush the line.    	***OR culture growing Staph Capitis and Epidermitis sensitive to oxacillin depth of infection to epidural as per neurosurgery***      Pt was seen and examined at the bedside.  Pt states she was sent to ED by Dr. Calixto due to malfunction of PICC line which was placed for Vancomyc until 12/13/2019.  Pt was seen by Dr. Anaya ( Plastics) 12/10/2019) and was advised for weekly follow ups for Right frontal scalp wound check.  Pt denies chills/fevers, wound drainage, worsening headache, any neurological changes, photophobia.  At this time pt is refusing blood draw in ED,    ICU Vital Signs Last 24 Hrs  T(C): 36.6 (11 Dec 2019 21:27), Max: 36.6 (11 Dec 2019 21:27)  T(F): 97.8 (11 Dec 2019 21:27), Max: 97.8 (11 Dec 2019 21:27)  HR: 61 (11 Dec 2019 21:27) (61 - 82)  BP: 101/67 (11 Dec 2019 21:27) (101/67 - 113/77)  BP(mean): --  ABP: --  ABP(mean): --  RR: 18 (11 Dec 2019 21:27) (18 - 18)  SpO2: 98% (11 Dec 2019 21:27) (98% - 100%)      Exam:  AA&Ox3, clear coherent speech  CN II-XII: PERRL, EOMI   Motor: MAEx4, 5/5 strength throughout, no drift,  Incision: medial pole of the incision: soft and moist  SILT in UE and LE b/l  Cardiovascular:  regular rate and rhythm  Respiratory: clear to auscultation    Reommendations: HPI"  49F with aplastic anemia, s/p right craniotomy Stereotactic 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 Garcia 2/2019, s/p RTT/Chemo in [place] 5/2019, c/b wound infection s/p 10/28 for exploration and washout of craniotomy wound with plastics closure, receiving Vancomycin BID through L PICC line which was inserted on 11/1/19, here for evaluation of clogged PICC line. She last infused vancomycin yesterday morning and was unable to in the evening and this morning. VNS came to the house and was unable to aspirate or flush the line.    	***OR culture growing Staph Capitis and Epidermitis sensitive to oxacillin depth of infection to epidural as per neurosurgery***      Pt was seen and examined at the bedside.  Pt states she was sent to ED by Dr. Calixto (ID) due to malfunction of PICC line which was placed for Vancomyc until 12/13/2019.  Pt was seen by Dr. Anaya ( Plastics) 12/10/2019) and was advised for weekly follow ups for Right frontal scalp wound check.  Pt denies chills/fevers, wound drainage, worsening headache, any neurological changes, photophobia.  At this time pt is refusing blood draw in ED,    ICU Vital Signs Last 24 Hrs  T(C): 36.6 (11 Dec 2019 21:27), Max: 36.6 (11 Dec 2019 21:27)  T(F): 97.8 (11 Dec 2019 21:27), Max: 97.8 (11 Dec 2019 21:27)  HR: 61 (11 Dec 2019 21:27) (61 - 82)  BP: 101/67 (11 Dec 2019 21:27) (101/67 - 113/77)  BP(mean): --  ABP: --  ABP(mean): --  RR: 18 (11 Dec 2019 21:27) (18 - 18)  SpO2: 98% (11 Dec 2019 21:27) (98% - 100%)      Exam:  AA&Ox3, clear coherent speech  CN II-XII: PERRL, EOMI   Motor: MAEx4, 5/5 strength throughout, no drift,  Incision: crusted yellow dried layer covering the entire incision,  no  loss of approximation of wound edges, medial pole of the incision: soft and moist with some white adherent tissue, no obvious purulent exudate,  no obvious fluctuating collection was appreciated, no purulent drainage that I was able to express,  SILT in UE and LE b/l  Cardiovascular:  regular rate and rhythm  Respiratory: clear to auscultation    49y Female with h/o 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 Calixto for malfunction of PICC line    - Pt was explained that Vancomycin end date is 12/13/2019 as per ID Dr. Calixto, and since the PICC line is malfunctioned, she will need to stay overnight to get IV access and complete the Vancomycin course.  Pt confirmed that she understood the reason as to why she needs to be admitted, but adamantly refuses to be admitted and expressed her desire to go home and follow up as outpatient.  Pt confirmed that she understood the possible complications from not completing the course of Vancomycin, including progression of the infection, sepsis, neurological deterioration and possible death.    - Pt was strongly advised to get MRI brain w/ & w/o cont to rule out a possible abscess, but patient refused the MRI as well.  Patient did not want to stay in the ED any longer and was getting ready to go home.  Pt was explained the importance of the MRI brain and possible complications of not getting the MRI done.  Pt confirmed that she understood the risks and adamantly refused and expressed her desire to go home now and follow up as outpatient with Dr. Fitzpatrick  - Pt is refusing the have blood drawn in ED for WBC, Sed Rate/CRP  -  Pt was seen by Dr. Anaya ( Plastics) 12/10/2019) and was advised by Dr. Anaya for weekly follow ups for Right frontal scalp wound check.    -  From Neurosurgical Standpoint pt was not cleared for discharge to home.  Pt is leaving AMA.  - D/w Dr. Fitzpatrick

## 2019-12-11 NOTE — ASSESSMENT
[FreeTextEntry1] : Plan:\par - Dr. 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 Fitzpatrick did review with her the option of following up on an outpatient basis, but that it may take longer than necessary to obtain all new imaging and consults. Patient wishes to proceed with that option. \par - MRI brain ordered today, awaiting auth. Dr. Fitzpatrick has spoke with Dr. Vignesh Anaya the plastic surgeon who closed her wound who will see the patient in his office this tomorrow. Dr. Fitzpatrick has also spoken with Dr. Calixto, ID doctor who instructed patient to continue administration of IV Vanco at this time and followup in office. \par - Patient encouraged to notify our office if she develops fever, severe h/a or increased drainage to surgical site. She was instructed to leave the area [place] while at home but cover with gauze and tape if riding the train (gauze and tape provided).\par \par Patient verbalized understanding and agreement with current plan, she is tearful regarding diagnosis.

## 2019-12-11 NOTE — ED ADULT TRIAGE NOTE - CHIEF COMPLAINT QUOTE
PICC line in place to L upper arm for vancomycin infusion s/p infection of scar from brain biopsy, pt reports today PICC line unable to flush or administer medication; denies pain, denies f/c

## 2019-12-12 ENCOUNTER — APPOINTMENT (OUTPATIENT)
Dept: INFECTIOUS DISEASE | Facility: CLINIC | Age: 49
End: 2019-12-12
Payer: COMMERCIAL

## 2019-12-12 VITALS
WEIGHT: 177 LBS | SYSTOLIC BLOOD PRESSURE: 113 MMHG | TEMPERATURE: 98.2 F | HEART RATE: 79 BPM | BODY MASS INDEX: 26.22 KG/M2 | DIASTOLIC BLOOD PRESSURE: 77 MMHG | OXYGEN SATURATION: 99 % | HEIGHT: 69 IN

## 2019-12-12 PROCEDURE — 99214 OFFICE O/P EST MOD 30 MIN: CPT

## 2019-12-12 NOTE — HISTORY OF PRESENT ILLNESS
[FreeTextEntry1] : 49 year old female here for follow up.  She is 4s/p right craniotomy with stereotactic 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 Garcia 2/2019, s/p RTT/Chemo in [place] 5/2019, presented to [facility] ED 10/23 with c/o wound drainage since 10/19/2019. On 10/28, she underwent exploration and washout of craniotomy wound with plastics closure. She was given vanc/cefepime for post-op ppx. OR cultures grew Staph Capitis and Epidermitis. Per neurosurgery, depth of infection to epidural. She continues on vancomycin 1250 mg q 12 h x 6 weeks. She completed her antibiotics on 12/10.  Her PICC line became clotted on 12/11.  She went to the ER to have it removed.  \par \par She reports she feels well.  She does not have any purulent drainage.  She notes some crusting at the scar.  She has been putting some iodine and bacitracin on the wound the past couple days.  She has no headaches or fevers.  \par

## 2019-12-12 NOTE — PHYSICAL EXAM
[Sclera] : the sclera and conjunctiva were normal [General Appearance - In No Acute Distress] : in no acute distress [General Appearance - Alert] : alert [Outer Ear] : the ears and nose were normal in appearance [Neck Appearance] : the appearance of the neck was normal [Auscultation Breath Sounds / Voice Sounds] : lungs were clear to auscultation bilaterally [Heart Sounds] : normal S1 and S2 [Heart Rate And Rhythm] : heart rate was normal and rhythm regular [Abdomen Soft] : soft [Bowel Sounds] : normal bowel sounds [Edema] : there was no peripheral edema [Musculoskeletal - Swelling] : no joint swelling [No Palpable Adenopathy] : no palpable adenopathy [] : no rash [No Focal Deficits] : no focal deficits [Motor Exam] : the motor exam was normal [Oriented To Time, Place, And Person] : oriented to person, place, and time [FreeTextEntry1] : scalp wound is clean; there is scant brown drainage on glove; no purulence

## 2019-12-15 LAB
CULTURE RESULTS: SIGNIFICANT CHANGE UP
SPECIMEN SOURCE: SIGNIFICANT CHANGE UP
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
## 2019-12-17 DIAGNOSIS — T82.898A OTHER SPECIFIED COMPLICATION OF VASCULAR PROSTHETIC DEVICES, IMPLANTS AND GRAFTS, INITIAL ENCOUNTER: ICD-10-CM

## 2019-12-18 ENCOUNTER — APPOINTMENT (OUTPATIENT)
Dept: INFECTIOUS DISEASE | Facility: CLINIC | Age: 49
End: 2019-12-18

## 2019-12-18 LAB
CULTURE RESULTS: SIGNIFICANT CHANGE UP
SPECIMEN SOURCE: SIGNIFICANT CHANGE UP

## 2019-12-31 DIAGNOSIS — S01.90XA UNSPECIFIED OPEN WOUND OF UNSPECIFIED PART OF HEAD, INITIAL ENCOUNTER: ICD-10-CM

## 2020-01-07 ENCOUNTER — FORM ENCOUNTER (OUTPATIENT)
Age: 50
End: 2020-01-07

## 2020-01-08 ENCOUNTER — APPOINTMENT (OUTPATIENT)
Dept: MRI IMAGING | Facility: CLINIC | Age: 50
End: 2020-01-08
Payer: COMMERCIAL

## 2020-01-08 ENCOUNTER — OUTPATIENT (OUTPATIENT)
Dept: OUTPATIENT SERVICES | Facility: HOSPITAL | Age: 50
LOS: 1 days | End: 2020-01-08

## 2020-01-08 DIAGNOSIS — Z90.710 ACQUIRED ABSENCE OF BOTH CERVIX AND UTERUS: Chronic | ICD-10-CM

## 2020-01-08 PROCEDURE — 70553 MRI BRAIN STEM W/O & W/DYE: CPT | Mod: 26

## 2020-01-17 ENCOUNTER — APPOINTMENT (OUTPATIENT)
Dept: NEUROSURGERY | Facility: CLINIC | Age: 50
End: 2020-01-17
Payer: COMMERCIAL

## 2020-01-17 ENCOUNTER — OUTPATIENT (OUTPATIENT)
Dept: OUTPATIENT SERVICES | Facility: HOSPITAL | Age: 50
LOS: 1 days | End: 2020-01-17
Payer: COMMERCIAL

## 2020-01-17 VITALS
BODY MASS INDEX: 25.18 KG/M2 | WEIGHT: 170 LBS | RESPIRATION RATE: 18 BRPM | HEIGHT: 69 IN | TEMPERATURE: 98.1 F | HEART RATE: 74 BPM | SYSTOLIC BLOOD PRESSURE: 114 MMHG | OXYGEN SATURATION: 98 % | DIASTOLIC BLOOD PRESSURE: 78 MMHG

## 2020-01-17 DIAGNOSIS — Z90.710 ACQUIRED ABSENCE OF BOTH CERVIX AND UTERUS: Chronic | ICD-10-CM

## 2020-01-17 LAB
GRAM STN FLD: SIGNIFICANT CHANGE UP
SPECIMEN SOURCE: SIGNIFICANT CHANGE UP

## 2020-01-17 PROCEDURE — 99024 POSTOP FOLLOW-UP VISIT: CPT

## 2020-01-17 PROCEDURE — 87075 CULTR BACTERIA EXCEPT BLOOD: CPT

## 2020-01-17 PROCEDURE — 87070 CULTURE OTHR SPECIMN AEROBIC: CPT

## 2020-01-17 RX ORDER — VANCOMYCIN HYDROCHLORIDE 250 MG/5ML
SOLUTION ORAL
Refills: 0 | Status: DISCONTINUED | COMMUNITY
End: 2020-01-17

## 2020-01-21 DIAGNOSIS — K68.11 POSTPROCEDURAL RETROPERITONEAL ABSCESS: ICD-10-CM

## 2020-01-21 DIAGNOSIS — T81.49XA INFECTION FOLLOWING A PROCEDURE, OTHER SURGICAL SITE, INITIAL ENCOUNTER: ICD-10-CM

## 2020-01-21 NOTE — DATA REVIEWED
[de-identified] : \par EXAM: MR BRAIN WAW IC \par \par PROCEDURE DATE: 01/08/2020 \par \par \par \par \par INTERPRETATION: PROCEDURE: MRI Brain with and without contrast \par \par INDICATION: Evaluate nonhealing wound status post brain surgery. \par \par TECHNIQUE: Sagittal and axial T1, axial and coronal T2 FLAIR, axial T2, SWI \par and diffusion imaging of the brain is obtained. Following the intravenous \par administration of 8.0 cc Gadavist contrast material, axial T1 spin-echo \par imaging is obtained followed by sagittal T1 volumetric imaging with MPR \par provided. \par \par COMPARISON: MR brain dated 10/27/2019. \par \par FINDINGS: \par \par Patient is status post right frontal craniotomy for right frontal lobe \par biopsy. There is a new thin linear area of restricted diffusion in the \par subcutaneous tissue anterior to the craniotomy site which measures 8 mm in \par length (series 5 image 24) with increase subcutaneous and epidural \par enhancement around the craniotomy. There is no drainable fluid collection. \par There is persistent subcutaneous enhancement. \par \par  Again noted is susceptibility related signal loss and T1 hyperintensity \par within the surgical cavity due to hemosiderin deposition. \par \par No significant change in encephalomalacia and gliosis along the right \par anterior frontal lobe, with persistent ex vacuo dilatation of the right \par frontal lobe. In addition, there is persistent increased T2 FLAIR signal \par across the corpus callosum, which measures approximately 6 mm at the level \par of the genu, compared to 8 mm on prior study. It is overall stable \par appearance of hyperintensity signal throughout the periventricular white \par matter, possibly sequela of radiation therapy. There is no mass effect, \par midline shift or hydrocephalus. \par \par Diffusion imaging is negative for recent infarction. There is no \par pathological parenchymal enhancement. \par \par There is scattered opacification within the bilateral mastoid air complexes, \par stable from prior exam. Paranasal sinuses are well aerated. \par \par IMPRESSION: \par \par Since prior MRI brain 10/27/2019, patient has undergone interval exploration \par of the craniotomy wound debridement. There is been interval development of \par of a thin linear area of restricted diffusion in the subcutaneous tissue \par anterior to the craniotomy site with increased subcutaneous and epidural \par enhancement around the craniotomy. These findings may be related to \par postsurgical changes however, given the provided history of nonhealing \par wound, infection cannot be excluded and clinical correlation is warranted. \par No evidence of drainable fluid collection. \par \par No abnormal intracranial enhancement, as above. Similar appearance of \par masslike signal within the periventricular white matter, with improvement in \par masslike signal abnormality of the genu of the corpus callosum. \par \par \par \par \par \par \par JACK GARCIA M.D., RADIOLOGY RESIDENT \par This document has been electronically signed. \par NICK ROMERO M.D., ATTENDING RADIOLOGIST \par This document has been electronically signed. Jan 9 2020 3:20PM \par \par \par \par \par

## 2020-01-21 NOTE — PHYSICAL EXAM
[General Appearance - Alert] : alert [General Appearance - In No Acute Distress] : in no acute distress [___ Drainage] : exhibiting [unfilled] drainage [Oriented To Time, Place, And Person] : oriented to person, place, and time [Motor Tone] : muscle tone was normal in all four extremities [Motor Strength] : muscle strength was normal in all four extremities [Sclera] : the sclera and conjunctiva were normal [Outer Ear] : the ears and nose were normal in appearance [] : no respiratory distress [Neck Appearance] : the appearance of the neck was normal [Abnormal Walk] : normal gait [Skin Color & Pigmentation] : normal skin color and pigmentation [Erythema] : not erythematous [Tender] : not tender [Warm] : not warm [FreeTextEntry1] : RIGHT frontal

## 2020-01-21 NOTE — HISTORY OF PRESENT ILLNESS
[FreeTextEntry1] : 49 year old woman who presented with severe headaches in January 2018 and workup demonstrated brain mass.\par \par She underwent biopsy of brain mass on 2/1/19 and pathology indicated high grade glioma.\par \par She was undergoing chemotherapy  closer to her family in [place]. Her last treatment was in March 2019. She returned to [place] after this treatment and has since been followed at [facility] with Dr. Yuliet Patel.\par \par She presented to [facility] ED on 10/23/19 with purulent drainage and dehiscence from previous right frontal cranial incision site. 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

## 2020-01-21 NOTE — ADDENDUM
[FreeTextEntry1] : 2020 \par  \par OFFICE FOLLOWUP NOTE \par  \par  \par Re:	Yolanda Montes  \par :	70 \par MRN:  [id number] \par  \par Ms. Montes is a 49-year-old woman who underwent initial right frontal craniotomy by Dr. Garcia for resection of a glioma. She subsequently underwent chemotherapy and radiation treatment. Postoperatively she developed wound dehiscence and infection and I subsequently took her to the operating room for wound debridement and washout. This was on 2019. Dr. Héctor Medellin of Plastic Surgery closed her wound. At that time she had been on IV antibiotic therapy with a PICC line at home. Antibiotics have subsequently been discontinued. However, she has noticed persistent drainage from her wound. \par  \par On examination today she does have purulent drainage from the wound. I reviewed her recent brain MRI from  of this year, and it does not show any levi abscess or collection. Nonetheless, she clearly has purulent material coming out of the wound. She is otherwise neurologically intact.  \par  \par I discussed the situation with Dr. Héctor Medellin, and as well I gave the patient options for repeat wound debridement with or without craniectomy. I gave her the opinion that craniectomy with removal of all hardware may give her a better chance of clearing the infection and healing the wound. She understands that a craniectomy will require subsequent surgery at some point in the future, typically 3 or more months after her wound is fully healed, for a cranioplasty to replace the skull defect. Ms. Yolanda Montes understands this and she favors moving ahead with wound debridement with craniectomy as opposed to the less invasive wound washout without craniectomy. Dr. Héctor Medellin of Plastic Surgery is in agreement, and will again be present for wound closure.  \par  \par I would like Ms. Montes to reconsult with her ID physician, Dr. Calixto regarding further antibiotic therapy. I will also culture the wound today in the office. We will plan for her right frontal craniectomy and wound washout as soon as she obtains medical clearance.  \par  \par  \par  \par  \par Byron Fitzpatrick M.D. \par  of Neurosurgery \par [facility] \[facility] Department of Neurosurgery \par [facility] \par [address] \par [facility] [address] \par [place] [id number] \par Tel: [phone] \par Email:  [email or web address] \par  \par  \par DAVID/[employer] #[id number] \par  \par

## 2020-01-21 NOTE — ASSESSMENT
[FreeTextEntry1] : MRI reviewed by Dr. Fitzpatrick with patient.\par Wound is open and draining - denies fever, chills.\par Recommend wound washout - to be scheduled with plastic surgery, Dr. Vignesh Anaya on 1/30/2020.\par Will need medical clearance prior to proceeding with surgery.\par PST packet provided and reviewed with patient.\par Education provided regarding plan of care.\par Wound culture done today and sent to lab.\par \par Reviewed s/s infection including fever, weakness, chills, report to ED or notify MD.\par \par Patient verbalizes agreement and understanding with plan.

## 2020-01-21 NOTE — REASON FOR VISIT
[Family Member] : family member [de-identified] : Right frontal craniotomy for evacuation of intracranial/epidural abscess, cranial wound debridement [de-identified] : 10/28/19 [de-identified] : \par TODAY'S VISIT:\par Called by Dr. Vignesh Anaya regarding opened surgical incision.\par Comes to the office today for further discussion of surgical recommendations.\par Denies fevers, focal weakness, nausea/vomiting, chills, headaches, dizziness.\par She has been off IV vancomycin since 12/10.\par \par PREVIOUS VISIT 12/9/19:\jose Returns today for follow up. She has an area of scant purulent drainage coming from an unhealed area of her RIGHT frontal cranial surgical site. She completed IV Vanc yesterday, PICC line remains in place. She denies h/a, n/v, fever and generalized weakness.\par \par PREVIOUS OFFICE VISIT 11/11/19:\par Reports she is doing well.\par She comes to the appointment with her sister.\par Denies any signs of postop wound infection which could include but is not limited to redness/swelling/purulent drainage\par Denies CP/SOB/unilateral leg edema. Denies headaches, nausea, vomiting, dizziness, weakness, changes in vision/speech.\par She is slowly introducing preop activities.\par \par She is currently on IV vancomycin bid. \par

## 2020-01-23 LAB
CULTURE RESULTS: NO GROWTH — SIGNIFICANT CHANGE UP
SPECIMEN SOURCE: SIGNIFICANT CHANGE UP

## 2020-01-29 NOTE — PATIENT PROFILE ADULT - INFLUENZA IMMUNIZATION DATE (APPROXIMATE)
30-Dec-2019 Alert and oriented to person, place, time/situation. normal mood and affect. no apparent risk to self or others.

## 2020-01-30 ENCOUNTER — APPOINTMENT (OUTPATIENT)
Dept: NEUROSURGERY | Facility: HOSPITAL | Age: 50
End: 2020-01-30

## 2020-01-30 ENCOUNTER — RESULT REVIEW (OUTPATIENT)
Age: 50
End: 2020-01-30

## 2020-01-30 ENCOUNTER — INPATIENT (INPATIENT)
Facility: HOSPITAL | Age: 50
LOS: 3 days | Discharge: HOME | DRG: 856 | End: 2020-02-03
Attending: NEUROLOGICAL SURGERY | Admitting: NEUROLOGICAL SURGERY
Payer: COMMERCIAL

## 2020-01-30 VITALS
RESPIRATION RATE: 16 BRPM | SYSTOLIC BLOOD PRESSURE: 103 MMHG | TEMPERATURE: 97 F | WEIGHT: 170.2 LBS | OXYGEN SATURATION: 100 % | DIASTOLIC BLOOD PRESSURE: 65 MMHG | HEART RATE: 70 BPM | HEIGHT: 69 IN

## 2020-01-30 DIAGNOSIS — T81.30XA DISRUPTION OF WOUND, UNSPECIFIED, INITIAL ENCOUNTER: ICD-10-CM

## 2020-01-30 DIAGNOSIS — Z98.890 OTHER SPECIFIED POSTPROCEDURAL STATES: Chronic | ICD-10-CM

## 2020-01-30 DIAGNOSIS — Z90.710 ACQUIRED ABSENCE OF BOTH CERVIX AND UTERUS: Chronic | ICD-10-CM

## 2020-01-30 LAB
ALBUMIN SERPL ELPH-MCNC: 3.5 G/DL — SIGNIFICANT CHANGE UP (ref 3.3–5)
ALP SERPL-CCNC: 64 U/L — SIGNIFICANT CHANGE UP (ref 40–120)
ALT FLD-CCNC: 13 U/L — SIGNIFICANT CHANGE UP (ref 10–45)
ANION GAP SERPL CALC-SCNC: 10 MMOL/L — SIGNIFICANT CHANGE UP (ref 5–17)
APTT BLD: 25.9 SEC — LOW (ref 27.5–36.3)
AST SERPL-CCNC: 18 U/L — SIGNIFICANT CHANGE UP (ref 10–40)
BASOPHILS # BLD AUTO: 0 K/UL — SIGNIFICANT CHANGE UP (ref 0–0.2)
BASOPHILS NFR BLD AUTO: 0 % — SIGNIFICANT CHANGE UP (ref 0–2)
BILIRUB SERPL-MCNC: 0.5 MG/DL — SIGNIFICANT CHANGE UP (ref 0.2–1.2)
BUN SERPL-MCNC: 12 MG/DL — SIGNIFICANT CHANGE UP (ref 7–23)
CALCIUM SERPL-MCNC: 9.4 MG/DL — SIGNIFICANT CHANGE UP (ref 8.4–10.5)
CHLORIDE SERPL-SCNC: 110 MMOL/L — HIGH (ref 96–108)
CO2 SERPL-SCNC: 23 MMOL/L — SIGNIFICANT CHANGE UP (ref 22–31)
CREAT SERPL-MCNC: 0.66 MG/DL — SIGNIFICANT CHANGE UP (ref 0.5–1.3)
EOSINOPHIL # BLD AUTO: 0 K/UL — SIGNIFICANT CHANGE UP (ref 0–0.5)
EOSINOPHIL NFR BLD AUTO: 0 % — SIGNIFICANT CHANGE UP (ref 0–6)
GLUCOSE BLDC GLUCOMTR-MCNC: 127 MG/DL — HIGH (ref 70–99)
GLUCOSE SERPL-MCNC: 140 MG/DL — HIGH (ref 70–99)
GRAM STN FLD: SIGNIFICANT CHANGE UP
HCT VFR BLD CALC: 29.8 % — LOW (ref 34.5–45)
HGB BLD-MCNC: 9.7 G/DL — LOW (ref 11.5–15.5)
INR BLD: 1.28 — HIGH (ref 0.88–1.16)
LYMPHOCYTES # BLD AUTO: 0.33 K/UL — LOW (ref 1–3.3)
LYMPHOCYTES # BLD AUTO: 8.8 % — LOW (ref 13–44)
MCHC RBC-ENTMCNC: 32.6 GM/DL — SIGNIFICANT CHANGE UP (ref 32–36)
MCHC RBC-ENTMCNC: 32.9 PG — SIGNIFICANT CHANGE UP (ref 27–34)
MCV RBC AUTO: 101 FL — HIGH (ref 80–100)
MONOCYTES # BLD AUTO: 0.07 K/UL — SIGNIFICANT CHANGE UP (ref 0–0.9)
MONOCYTES NFR BLD AUTO: 1.8 % — LOW (ref 2–14)
NEUTROPHILS # BLD AUTO: 3.24 K/UL — SIGNIFICANT CHANGE UP (ref 1.8–7.4)
NEUTROPHILS NFR BLD AUTO: 82.3 % — HIGH (ref 43–77)
PLATELET # BLD AUTO: 72 K/UL — LOW (ref 150–400)
POTASSIUM SERPL-MCNC: 3.6 MMOL/L — SIGNIFICANT CHANGE UP (ref 3.5–5.3)
POTASSIUM SERPL-SCNC: 3.6 MMOL/L — SIGNIFICANT CHANGE UP (ref 3.5–5.3)
PROT SERPL-MCNC: 6.1 G/DL — SIGNIFICANT CHANGE UP (ref 6–8.3)
PROTHROM AB SERPL-ACNC: 14.7 SEC — HIGH (ref 10–12.9)
RBC # BLD: 2.95 M/UL — LOW (ref 3.8–5.2)
RBC # FLD: 13.3 % — SIGNIFICANT CHANGE UP (ref 10.3–14.5)
SODIUM SERPL-SCNC: 143 MMOL/L — SIGNIFICANT CHANGE UP (ref 135–145)
SPECIMEN SOURCE: SIGNIFICANT CHANGE UP
WBC # BLD: 3.74 K/UL — LOW (ref 3.8–10.5)
WBC # FLD AUTO: 3.74 K/UL — LOW (ref 3.8–10.5)

## 2020-01-30 PROCEDURE — 88304 TISSUE EXAM BY PATHOLOGIST: CPT | Mod: 26

## 2020-01-30 PROCEDURE — 61320 CRNEC/CRNOT DRG ICR ABS STTL: CPT | Mod: 78

## 2020-01-30 PROCEDURE — 88311 DECALCIFY TISSUE: CPT | Mod: 26

## 2020-01-30 PROCEDURE — 99222 1ST HOSP IP/OBS MODERATE 55: CPT

## 2020-01-30 PROCEDURE — 99291 CRITICAL CARE FIRST HOUR: CPT | Mod: 24

## 2020-01-30 RX ORDER — HYDROMORPHONE HYDROCHLORIDE 2 MG/ML
0.5 INJECTION INTRAMUSCULAR; INTRAVENOUS; SUBCUTANEOUS ONCE
Refills: 0 | Status: DISCONTINUED | OUTPATIENT
Start: 2020-01-30 | End: 2020-01-30

## 2020-01-30 RX ORDER — ACETAMINOPHEN 500 MG
650 TABLET ORAL EVERY 6 HOURS
Refills: 0 | Status: DISCONTINUED | OUTPATIENT
Start: 2020-01-30 | End: 2020-02-03

## 2020-01-30 RX ORDER — CEFEPIME 1 G/1
2000 INJECTION, POWDER, FOR SOLUTION INTRAMUSCULAR; INTRAVENOUS EVERY 8 HOURS
Refills: 0 | Status: DISCONTINUED | OUTPATIENT
Start: 2020-01-30 | End: 2020-02-01

## 2020-01-30 RX ORDER — POVIDONE-IODINE 5 %
1 AEROSOL (ML) TOPICAL ONCE
Refills: 0 | Status: COMPLETED | OUTPATIENT
Start: 2020-01-30 | End: 2020-01-30

## 2020-01-30 RX ORDER — OXYCODONE HYDROCHLORIDE 5 MG/1
5 TABLET ORAL EVERY 4 HOURS
Refills: 0 | Status: DISCONTINUED | OUTPATIENT
Start: 2020-01-30 | End: 2020-02-03

## 2020-01-30 RX ORDER — LEVETIRACETAM 250 MG/1
500 TABLET, FILM COATED ORAL EVERY 12 HOURS
Refills: 0 | Status: DISCONTINUED | OUTPATIENT
Start: 2020-01-30 | End: 2020-01-31

## 2020-01-30 RX ORDER — SENNA PLUS 8.6 MG/1
2 TABLET ORAL AT BEDTIME
Refills: 0 | Status: DISCONTINUED | OUTPATIENT
Start: 2020-01-30 | End: 2020-02-03

## 2020-01-30 RX ORDER — ONDANSETRON 8 MG/1
4 TABLET, FILM COATED ORAL EVERY 6 HOURS
Refills: 0 | Status: DISCONTINUED | OUTPATIENT
Start: 2020-01-30 | End: 2020-02-03

## 2020-01-30 RX ORDER — HYDROMORPHONE HYDROCHLORIDE 2 MG/ML
0.5 INJECTION INTRAMUSCULAR; INTRAVENOUS; SUBCUTANEOUS EVERY 6 HOURS
Refills: 0 | Status: DISCONTINUED | OUTPATIENT
Start: 2020-01-30 | End: 2020-01-30

## 2020-01-30 RX ORDER — HYDROMORPHONE HYDROCHLORIDE 2 MG/ML
0.5 INJECTION INTRAMUSCULAR; INTRAVENOUS; SUBCUTANEOUS
Refills: 0 | Status: DISCONTINUED | OUTPATIENT
Start: 2020-01-30 | End: 2020-01-31

## 2020-01-30 RX ORDER — SODIUM CHLORIDE 9 MG/ML
1000 INJECTION INTRAMUSCULAR; INTRAVENOUS; SUBCUTANEOUS
Refills: 0 | Status: DISCONTINUED | OUTPATIENT
Start: 2020-01-30 | End: 2020-02-02

## 2020-01-30 RX ORDER — POLYETHYLENE GLYCOL 3350 17 G/17G
17 POWDER, FOR SOLUTION ORAL DAILY
Refills: 0 | Status: DISCONTINUED | OUTPATIENT
Start: 2020-01-30 | End: 2020-02-03

## 2020-01-30 RX ORDER — VANCOMYCIN HCL 1 G
1250 VIAL (EA) INTRAVENOUS EVERY 12 HOURS
Refills: 0 | Status: DISCONTINUED | OUTPATIENT
Start: 2020-01-30 | End: 2020-02-03

## 2020-01-30 RX ADMIN — ONDANSETRON 4 MILLIGRAM(S): 8 TABLET, FILM COATED ORAL at 22:46

## 2020-01-30 RX ADMIN — HYDROMORPHONE HYDROCHLORIDE 0.5 MILLIGRAM(S): 2 INJECTION INTRAMUSCULAR; INTRAVENOUS; SUBCUTANEOUS at 16:03

## 2020-01-30 RX ADMIN — HYDROMORPHONE HYDROCHLORIDE 0.5 MILLIGRAM(S): 2 INJECTION INTRAMUSCULAR; INTRAVENOUS; SUBCUTANEOUS at 19:33

## 2020-01-30 RX ADMIN — Medication 1 APPLICATION(S): at 09:00

## 2020-01-30 RX ADMIN — CEFEPIME 100 MILLIGRAM(S): 1 INJECTION, POWDER, FOR SOLUTION INTRAMUSCULAR; INTRAVENOUS at 18:00

## 2020-01-30 RX ADMIN — LEVETIRACETAM 420 MILLIGRAM(S): 250 TABLET, FILM COATED ORAL at 19:33

## 2020-01-30 RX ADMIN — HYDROMORPHONE HYDROCHLORIDE 0.5 MILLIGRAM(S): 2 INJECTION INTRAMUSCULAR; INTRAVENOUS; SUBCUTANEOUS at 15:18

## 2020-01-30 RX ADMIN — SENNA PLUS 2 TABLET(S): 8.6 TABLET ORAL at 21:29

## 2020-01-30 RX ADMIN — OXYCODONE HYDROCHLORIDE 5 MILLIGRAM(S): 5 TABLET ORAL at 23:37

## 2020-01-30 RX ADMIN — OXYCODONE HYDROCHLORIDE 5 MILLIGRAM(S): 5 TABLET ORAL at 23:03

## 2020-01-30 RX ADMIN — HYDROMORPHONE HYDROCHLORIDE 0.5 MILLIGRAM(S): 2 INJECTION INTRAMUSCULAR; INTRAVENOUS; SUBCUTANEOUS at 19:16

## 2020-01-30 NOTE — PROGRESS NOTE ADULT - SUBJECTIVE AND OBJECTIVE BOX
=================================  NEUROCRITICAL CARE ATTENDING NOTE  =================================    BRITTANY CARTER   MRN-[id number]  Summary:  48y/F who presented with headache and vision changes x 1 month.  Admitted for elective biopsy on . discharged, Course complicated by wound infection requiring exploraiton of wound on 10/28.  discharged, but still with drainage.  Readmitted today for craniectomy / wound debridement .     COURSE IN THE HOSPITAL:   Admitted to [facility], s/p OR    Past Medical History: Brain mass Fibroids No pertinent past medical history   Allergies:  penicillins (Unknown) sulfa drugs (Unknown)  Home meds: colace levetiracetam 500 PO daily oxycodone 5mg PO q6h tramadol 50 mg PO BID     PHYSICAL EXAMINATION  T(C): 36.3 ( @ 15:15), Max: 36.3 ( @ 15:15) HR: 61 ( @ 17:00) (56 - 86)BP: 115/64 ( @ 17:00) (103/65 - 122/77) RR: 16 ( @ 17:00) (14 - 18) SpO2: 100% ( @ 17:00) (100% - 100%)  NEUROLOGIC EXAMINATION:  Patient is awake, alert, fully oriented, pupils 2-3mm equal and briskly reactive to light, EOMs intact, moves all 4s with good strength  GENERAL: not intubated, not in cardiorespiratory distress  EENT:  anicteric  CARDIOVASCULAR: (+) S1 S2, normal rate and regular rhythm  PULMONARY: clear to auscultation bilaterally  ABDOMEN: soft, nontender with normoactive bowel sounds  EXTREMITIES: no edema  SKIN: no rash    LABS:  CAPILLARY BLOOD GLUCOSE 127               9.7    3.74  )-----------( 72       ( 2020 15:28 )             29.8     143  |  110<H>  |  12  ----------------------------<  140<H>  3.6   |  23  |  0.66    Ca    9.4      2020 15:28    TPro  6.1  /  Alb  3.5  /  TBili  0.5  /  DBili  x   /  AST  18  /  ALT  13  /  AlkPhos  64      Bacteriology:  CSF studies:  EEG:  Neuroimagin/01 MRI:  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, c/w gliomatosis   MRI: bilateral frontal / temporal lobe mass c/w diffuse low grade glioma   CT:  hypodensity bilateral frontal and temopral lobes R>L  Other imagin/23 Abd US:  hepatic steatosis   CT chest abd pelvis:  posterior R pelvis multiloculated cystic lesion - pelvic sonography; cystic lesion post R pelvis    MEDICATIONS:   cefepime 2G IV q8h vancomycin 1250 IV q12h bisacodyl 5mg PO q12h senna 2mg PO HS hydromorphone PRN oxycodone PRN polyethylene glycol 17G daily PRN    IV FLUIDS: NS@75cc/hr  DRIPS:  DIET: NPO  Lines:  Drains:    Wounds:    CODE STATUS:  Full Code                       GOALS OF CARE:  aggressive                      DISPOSITION:  ICU

## 2020-01-30 NOTE — CONSULT NOTE ADULT - SUBJECTIVE AND OBJECTIVE BOX
INFECTIOUS DISEASE SERVICE INITIAL CONSULT NOTE    HPI:  Pls find her latest chart note attached below:  49F with aplastic anemia, s/p right craniotomy Stereotactic Biopsy of the R  frontal non-enhancing tumor by Dr. Garcia 2/2019, s/p RTT/Chemo in [place]  5/2019, c/b wound infection s/p 10/28 for exploration and washout of craniotomy  wound with plastics closure, receiving Vancomycin BID through L PICC line which  was inserted on 11/1/19, here for evaluation of clogged PICC line. She last  infused vancomycin yesterday morning and was unable to in the evening and this  morning. VNS came to the house and was unable to aspirate or flush the line. (30 Jan 2020 12:09)      ADDITIONAL ID HPI:    PAST MEDICAL & SURGICAL HISTORY:  Intracranial abscess and granuloma  Brain mass  Fibroids  History of surgery: brain biopsy  H/O hysterectomy with unilateral oophorectomy      REVIEW OF SYSTEMS:  Otherwise negative other than what is stated in the HPI.    ACTIVE ANTIMICROBIAL/ANTIBIOTIC MEDICATIONS:  cefepime   IVPB 2000 milliGRAM(s) IV Intermittent every 8 hours  vancomycin  IVPB 1250 milliGRAM(s) IV Intermittent every 12 hours      PRIOR ANTIMICROBIAL HISTORY ON THIS ADMISSION:        OTHER MEDICATIONS:  acetaminophen   Tablet .. 650 milliGRAM(s) Oral every 6 hours PRN  HYDROmorphone  Injectable 0.5 milliGRAM(s) IV Push every 6 hours PRN  oxyCODONE    IR 5 milliGRAM(s) Oral every 4 hours PRN  sodium chloride 0.9%. 1000 milliLiter(s) IV Continuous <Continuous>      ALLERGIES:  Allergies    penicillins (Unknown)  sulfa drugs (Unknown)    Intolerances        SOCIAL HISTORY:    FAMILY HISTORY:      VITAL SIGNS:  ICU Vital Signs Last 24 Hrs  T(C): 36.3 (30 Jan 2020 15:15), Max: 36.3 (30 Jan 2020 15:15)  T(F): 97.4 (30 Jan 2020 15:15), Max: 97.4 (30 Jan 2020 15:15)  HR: 86 (30 Jan 2020 15:15) (70 - 86)  BP: 122/77 (30 Jan 2020 15:15) (103/65 - 122/77)  BP(mean): 94 (30 Jan 2020 15:15) (90 - 94)  ABP: --  ABP(mean): --  RR: 18 (30 Jan 2020 15:15) (14 - 18)  SpO2: 100% (30 Jan 2020 15:15) (100% - 100%)      PHYSICAL EXAM:  Constitutional: WDWN  Head: NC/AT  Eyes: PERRL; anicteric sclera  ENMT: no rhinorrhea; no sinus tenderness on palpation; no oropharyngeal lesions, erythema, or exudates	  Neck: supple; no JVD or LAD  Respiratory: CTA B/L  Cardiovascular: +S1/S2, RRR  Gastrointestinal: soft, NT/ND; intact BS; no HSM  Extremities: WWP; no clubbing, cyanosis, or edema  Vascular: 2+ radial, DP/PT pulses B/L  Dermatologic: skin warm and dry; no visible rashes or lesions  Neurologic: AAOx3; no focal deficits    LABS:                MICROBIOLOGY:      RADIOLOGY & ADDITIONAL STUDIES: INFECTIOUS DISEASE SERVICE INITIAL CONSULT NOTE    HPI:  49F with aplastic anemia, s/p right craniotomy Stereotactic 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 Garcia 2/2019, s/p RTT/Chemo in [place]  5/2019, c/b wound infection s/p 10/28 for exploration and washout of craniotomy wound with plastics closure, c/p 6 weeks receiving Vancomycin BID through L PICC line which  was inserted on 11/1/19. Her PICC line was clogged in Nov and repaired. Her PICC was removed after she completed tx. ,    ADDITIONAL ID HPI:    PAST MEDICAL & SURGICAL HISTORY:  Intracranial abscess and granuloma  Brain mass  Fibroids  History of surgery: brain biopsy  H/O hysterectomy with unilateral oophorectomy      REVIEW OF SYSTEMS:  Otherwise negative other than what is stated in the HPI.    ACTIVE ANTIMICROBIAL/ANTIBIOTIC MEDICATIONS:  cefepime   IVPB 2000 milliGRAM(s) IV Intermittent every 8 hours  vancomycin  IVPB 1250 milliGRAM(s) IV Intermittent every 12 hours      PRIOR ANTIMICROBIAL HISTORY ON THIS ADMISSION:        OTHER MEDICATIONS:  acetaminophen   Tablet .. 650 milliGRAM(s) Oral every 6 hours PRN  HYDROmorphone  Injectable 0.5 milliGRAM(s) IV Push every 6 hours PRN  oxyCODONE    IR 5 milliGRAM(s) Oral every 4 hours PRN  sodium chloride 0.9%. 1000 milliLiter(s) IV Continuous <Continuous>      ALLERGIES:  Allergies    penicillins (Unknown)  sulfa drugs (Unknown)    Intolerances        SOCIAL HISTORY:    FAMILY HISTORY:      VITAL SIGNS:  ICU Vital Signs Last 24 Hrs  T(C): 36.3 (30 Jan 2020 15:15), Max: 36.3 (30 Jan 2020 15:15)  T(F): 97.4 (30 Jan 2020 15:15), Max: 97.4 (30 Jan 2020 15:15)  HR: 86 (30 Jan 2020 15:15) (70 - 86)  BP: 122/77 (30 Jan 2020 15:15) (103/65 - 122/77)  BP(mean): 94 (30 Jan 2020 15:15) (90 - 94)  ABP: --  ABP(mean): --  RR: 18 (30 Jan 2020 15:15) (14 - 18)  SpO2: 100% (30 Jan 2020 15:15) (100% - 100%)      PHYSICAL EXAM:  Constitutional: WDWN  Head: NC/AT  Eyes: PERRL; anicteric sclera  ENMT: no rhinorrhea; no sinus tenderness on palpation; no oropharyngeal lesions, erythema, or exudates	  Neck: supple; no JVD or LAD  Respiratory: CTA B/L  Cardiovascular: +S1/S2, RRR  Gastrointestinal: soft, NT/ND; intact BS; no HSM  Extremities: WWP; no clubbing, cyanosis, or edema  Vascular: 2+ radial, DP/PT pulses B/L  Dermatologic: skin warm and dry; no visible rashes or lesions  Neurologic: AAOx3; no focal deficits    LABS:                MICROBIOLOGY:      RADIOLOGY & ADDITIONAL STUDIES: INFECTIOUS DISEASE SERVICE INITIAL CONSULT NOTE    HPI:  49F with aplastic anemia, s/p right craniotomy Stereotactic 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 Garcia 2/2019, s/p RTT/Chemo in [place]  5/2019, c/b wound infection s/p 10/28 for exploration and washout of craniotomy wound with plastics closure, s/p 6 weeks Vancomycin via L PICC line (end date 12/10/20). Surgery was scheduled after surgical site purulence was noted at outpatient neurosurgery follow up.    ADDITIONAL ID HPI: Denies fever, chills, tomeka/vom.     PAST MEDICAL & SURGICAL HISTORY:  Intracranial abscess and granuloma  Brain mass  Fibroids  History of surgery: brain biopsy  H/O hysterectomy with unilateral oophorectomy      REVIEW OF SYSTEMS:  Otherwise negative other than what is stated in the HPI.    ACTIVE ANTIMICROBIAL/ANTIBIOTIC MEDICATIONS:  cefepime   IVPB 2000 milliGRAM(s) IV Intermittent every 8 hours  vancomycin  IVPB 1250 milliGRAM(s) IV Intermittent every 12 hours      PRIOR ANTIMICROBIAL HISTORY ON THIS ADMISSION:        OTHER MEDICATIONS:  acetaminophen   Tablet .. 650 milliGRAM(s) Oral every 6 hours PRN  HYDROmorphone  Injectable 0.5 milliGRAM(s) IV Push every 6 hours PRN  oxyCODONE    IR 5 milliGRAM(s) Oral every 4 hours PRN  sodium chloride 0.9%. 1000 milliLiter(s) IV Continuous <Continuous>      ALLERGIES:  Allergies    penicillins (Unknown)  sulfa drugs (Unknown)    Intolerances        SOCIAL HISTORY:    FAMILY HISTORY:      VITAL SIGNS:  ICU Vital Signs Last 24 Hrs  T(C): 36.3 (30 Jan 2020 15:15), Max: 36.3 (30 Jan 2020 15:15)  T(F): 97.4 (30 Jan 2020 15:15), Max: 97.4 (30 Jan 2020 15:15)  HR: 86 (30 Jan 2020 15:15) (70 - 86)  BP: 122/77 (30 Jan 2020 15:15) (103/65 - 122/77)  BP(mean): 94 (30 Jan 2020 15:15) (90 - 94)  ABP: --  ABP(mean): --  RR: 18 (30 Jan 2020 15:15) (14 - 18)  SpO2: 100% (30 Jan 2020 15:15) (100% - 100%)      PHYSICAL EXAM:  Constitutional: WDWN, somnolent but arousable  Head: NC/AT  Eyes: PERRL; anicteric sclera  ENMT: no rhinorrhea; no sinus tenderness on palpation; no oropharyngeal lesions, erythema, or exudates, frontal bandages in place - clean, dry, intactNeck: supple; no nuchal rigidity  Respiratory: CTA B/L  Cardiovascular: +S1/S2, RRR  Gastrointestinal: soft, NT/ND; intact BS; no HSM  Extremities: WWP; no clubbing, cyanosis, or edema  Vascular: 2+ radial, DP/PT pulses B/L  Dermatologic: skin warm and dry; no visible rashes or lesions  Neurologic: AAOx3;     LABS:                MICROBIOLOGY:  Culture - Surgical Swab (01.17.20 @ 14:31)    Gram Stain:   No organisms seen  Rare White blood cells    Specimen Source: .Surgical Swab drainage from rt frontal craniotomy    Culture Results:   No growth        RADIOLOGY & ADDITIONAL STUDIES: Reviewed

## 2020-01-30 NOTE — H&P PST ADULT - HISTORY OF PRESENT ILLNESS
Pls find her latest chart note attached below:  49F with aplastic anemia, s/p right craniotomy Stereotactic Biopsy of the R  frontal non-enhancing tumor by Dr. Garcia 2/2019, s/p RTT/Chemo in [place]  5/2019, c/b wound infection s/p 10/28 for exploration and washout of craniotomy  wound with plastics closure, receiving Vancomycin BID through L PICC line which  was inserted on 11/1/19, here for evaluation of clogged PICC line. She last  infused vancomycin yesterday morning and was unable to in the evening and this  morning. VNS came to the house and was unable to aspirate or flush the line. Pls find her latest chart note attached below:  49F with aplastic anemia, s/p right craniotomy Stereotactic Biopsy of the R  frontal non-enhancing tumor by Dr. Garcia 2/2019, s/p RTT/Chemo in [place]  5/2019, c/b wound infection s/p 10/28 for exploration and washout of craniotomy  wound with plastics closure, receiving Vancomycin BID through L PICC line.

## 2020-01-30 NOTE — BRIEF OPERATIVE NOTE - NSEVIDNCEINFORABSCESSFT_GEN_ALL_CORE
Subgaleal pus with unhealthy granulation tissue was seen. debridement and pulse irrigation done. Bone flap was removed.

## 2020-01-30 NOTE — CHART NOTE - NSCHARTNOTEFT_GEN_A_CORE
Infectious Diseases Anti-infective Approval Note    Medication:  Cefepime   Dose:  2 grams  Route:  IV  Frequency:  q8hrs  Duration:  3 days     Dose may be adjusted as needed for alterations in renal function.    *THIS IS NOT AN INFECTIOUS DISEASES CONSULTATION*

## 2020-01-30 NOTE — PROGRESS NOTE ADULT - SUBJECTIVE AND OBJECTIVE BOX
NEUROSURGERY POST OP NOTE:    POD# 0 S/P Right frontal craniectomy/wound washout    S: 50 y/o female seen and examined in PACU.  Reports mild headache.  Denies nausea/vomiting/shortness of breath/chest pain.  Denies new numbness or weakness.      T(C): 36.3 (01-30-20 @ 15:15), Max: 36.3 (01-30-20 @ 15:15)  HR: 61 (01-30-20 @ 17:00) (56 - 86)  BP: 115/64 (01-30-20 @ 17:00) (103/65 - 122/77)  RR: 16 (01-30-20 @ 17:00) (14 - 18)  SpO2: 100% (01-30-20 @ 17:00) (100% - 100%)        acetaminophen   Tablet .. 650 milliGRAM(s) Oral every 6 hours PRN  artificial  tears Solution 1 Drop(s) Both EYES four times a day PRN  bisacodyl 5 milliGRAM(s) Oral every 12 hours  cefepime   IVPB 2000 milliGRAM(s) IV Intermittent every 8 hours  HYDROmorphone  Injectable 0.5 milliGRAM(s) IV Push every 3 hours PRN  HYDROmorphone  Injectable 0.5 milliGRAM(s) IV Push once  ondansetron Injectable 4 milliGRAM(s) IV Push every 6 hours PRN  oxyCODONE    IR 5 milliGRAM(s) Oral every 4 hours PRN  polyethylene glycol 3350 17 Gram(s) Oral daily PRN  senna 2 Tablet(s) Oral at bedtime  sodium chloride 0.9%. 1000 milliLiter(s) IV Continuous <Continuous>  vancomycin  IVPB 1250 milliGRAM(s) IV Intermittent every 12 hours      RADIOLOGY: No new imaging    Exam: 50 y/o female AA&O x 3 in NAD. PERRLA, EOMI.  CN II-XII grossly intact.  HARMON x 4, motor nonfocal.  SILT throughout.  No pronator drift, no dysmetria.    Right frontal cranial incision with dressing intact.  HMV drain with sanguinous drainage.  Cor RRR S1, S2+  Pulm CTA B/L  Abd Soft, NT/ND. +BS  Extrem: calves soft, nontender. no edema    WOUND/DRAINS: HMV (epidural)        Assessment: 49yFemale with Hx High grade glioma Dx 2/2019 (crani) s/p ChemoXRT 5/2019, with hx pancytopenia/aplastic anemia, prior infection/wound washout 10/2019 requiring PICC as outpatient with IV vancomycin, now POD#0 s/p Right Frontal Craniectomy / Washout with Dr. Byron Fitzpatrick and Plastics Dr. Vignesh Anaya.      Plan: - Advance diet as tolerated   - Neuro checks   - Vitals checks   - Admit to ICU   - Imaging only if change in exam   - ID consult: vanc, cefepime   - Heme consult Dr. Luong for hx pancytopenia   - Keppra prophylaxis for 5 days   - HMV drain is epidural, to continue on self-suction   - Admission surveillance dopplers b/l LE   - Pain control PRN    All above d/w Dr. Collazo and Dr Fitzpatrick.      Assessment:  Present when checked    []  GCS  E   V  M     Heart Failure: []Acute, [] acute on chronic , []chronic  Heart Failure:  [] Diastolic (HFpEF), [] Systolic (HFrEF), []Combined (HFpEF and HFrEF), [] RHF, [] Pulm HTN, [] Other    [] IRMA, [] ATN, [] AIN, [] other  [] CKD1, [] CKD2, [] CKD 3, [] CKD 4, [] CKD 5, []ESRD    Encephalopathy: [] Metabolic, [] Hepatic, [] toxic, [] Neurological, [] Other    Abnormal Nurtitional Status: [] malnurtition (see nutrition note), [ ]underweight: BMI < 19, [] morbid obesity: BMI >40, [] Cachexia    [] Sepsis  [] hypovolemic shock,[] cardiogenic shock, [] hemorrhagic shock, [] neuogenic shock  [] Acute Respiratory Failure  []Cerebral edema, [] Brain compression/ herniation,   [] Functional quadriplegia  [] Acute blood loss anemia

## 2020-01-30 NOTE — H&P PST ADULT - NSICDXPASTSURGICALHX_GEN_ALL_CORE_FT
PAST SURGICAL HISTORY:  H/O hysterectomy with unilateral oophorectomy     History of surgery brain biopsy

## 2020-01-30 NOTE — H&P PST ADULT - ASSESSMENT
49F with aplastic anemia, s/p right craniotomy Stereotactic 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 Garcia 2/2019, s/p RTT/Chemo admitted for wound debridement, craniectomy and wound closure

## 2020-01-30 NOTE — CHART NOTE - NSCHARTNOTEFT_GEN_A_CORE
Neurosurgical Indications for Screening Dopplers on Admission:       1) Known hypercoagulation disorder (h/o VTE, thrombophilia, HIT, etc.)    2) Admitted from prolonged stay from another institution (straight forward ER transfers not included)  3) Presenting with significant leg immobility No  4) Presenting with signs and symptoms of VTE?  No  5) With significant critical illness, Including "found down" for unknown period of time in HPI No  6) With significant neurotrauma (TBI, SCI / TLS spine fractures)   7) Who are comatose   8) With known malignancy (e.g. glioblastoma multiforme, meningioma, etc.). Excludes IA chemo 23hr observation stays  9) On hemodialysis   10) Who have received platelet transfusion or antithrombotic reversal agents recently    11) Who have had recent major orthopedic surgery  No        Screening dopplers indicated?   Y     DVT Prophylaxis: SCD's

## 2020-01-30 NOTE — H&P PST ADULT - REASON FOR ADMISSION
49F with aplastic anemia, s/p right craniotomy Stereotactic 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 Garcia 2/2019, s/p RTT/Chemo admitted for wound debridement, craniectomy and closure by flap

## 2020-01-30 NOTE — CONSULT NOTE ADULT - ASSESSMENT
49F with aplastic anemia, s/p right craniotomy stereotactic Biopsy of the R frontal non-enhancing tumor 2/2019, s/p RTT/Chemo in [place]  5/2019, c/b wound infection s/p 10/28 for exploration and washout of craniotomy wound with plastics closure, s/p 6 weeks Vancomycin via L PICC line (end date 12/10/20). Surgery was scheduled after surgical site purulence was noted at outpatient neurosurgery follow up.    Recommend:  - c/w cefepime [id number], Vanc [id number]  - f/u OR cultures      Plan discussed with ID attending.

## 2020-01-30 NOTE — CHART NOTE - NSCHARTNOTEFT_GEN_A_CORE
Neurosurgical Indications for Screening Dopplers on Admission:       1) Known hypercoagulation disorder (h/o VTE, thrombophilia, HIT, etc.)   2) Admitted from prolonged stay from another institution (straight forward ER transfers not included)  3) Presenting with significant leg immobility   4) Presenting with signs and symptoms of VTE?    5) With significant critical illness, Including "found down" for unknown period of time in HPI  6) With significant neurotrauma (TBI, SCI / TLS spine fractures)   7) Who are comatose   8) With known malignancy (e.g. glioblastoma multiforme, meningioma, etc.). Excludes IA chemo 23hr observation stays  9) On hemodialysis   10) Who have received platelet transfusion or antithrombotic reversal agents recently   11) Who have had recent major orthopedic surgery      "yes" to 8, remainder "no."    Screening dopplers indicated?   Y x   N _    DVT Prophylaxis:  x SCD's   _ chemoprophylaxis    All above d/w attending.

## 2020-01-31 DIAGNOSIS — D61.818 OTHER PANCYTOPENIA: ICD-10-CM

## 2020-01-31 LAB
ANION GAP SERPL CALC-SCNC: 8 MMOL/L — SIGNIFICANT CHANGE UP (ref 5–17)
BUN SERPL-MCNC: 9 MG/DL — SIGNIFICANT CHANGE UP (ref 7–23)
CALCIUM SERPL-MCNC: 9.8 MG/DL — SIGNIFICANT CHANGE UP (ref 8.4–10.5)
CHLORIDE SERPL-SCNC: 108 MMOL/L — SIGNIFICANT CHANGE UP (ref 96–108)
CO2 SERPL-SCNC: 25 MMOL/L — SIGNIFICANT CHANGE UP (ref 22–31)
CREAT SERPL-MCNC: 0.58 MG/DL — SIGNIFICANT CHANGE UP (ref 0.5–1.3)
GLUCOSE SERPL-MCNC: 123 MG/DL — HIGH (ref 70–99)
HCT VFR BLD CALC: 27.3 % — LOW (ref 34.5–45)
HGB BLD-MCNC: 8.9 G/DL — LOW (ref 11.5–15.5)
MAGNESIUM SERPL-MCNC: 1.8 MG/DL — SIGNIFICANT CHANGE UP (ref 1.6–2.6)
MCHC RBC-ENTMCNC: 32.6 GM/DL — SIGNIFICANT CHANGE UP (ref 32–36)
MCHC RBC-ENTMCNC: 33.8 PG — SIGNIFICANT CHANGE UP (ref 27–34)
MCV RBC AUTO: 103.8 FL — HIGH (ref 80–100)
NRBC # BLD: 0 /100 WBCS — SIGNIFICANT CHANGE UP (ref 0–0)
PHOSPHATE SERPL-MCNC: 3.9 MG/DL — SIGNIFICANT CHANGE UP (ref 2.5–4.5)
PLATELET # BLD AUTO: 114 K/UL — LOW (ref 150–400)
POTASSIUM SERPL-MCNC: 4.2 MMOL/L — SIGNIFICANT CHANGE UP (ref 3.5–5.3)
POTASSIUM SERPL-SCNC: 4.2 MMOL/L — SIGNIFICANT CHANGE UP (ref 3.5–5.3)
RBC # BLD: 2.63 M/UL — LOW (ref 3.8–5.2)
RBC # FLD: 13.4 % — SIGNIFICANT CHANGE UP (ref 10.3–14.5)
SODIUM SERPL-SCNC: 141 MMOL/L — SIGNIFICANT CHANGE UP (ref 135–145)
VANCOMYCIN TROUGH SERPL-MCNC: 25.2 UG/ML — CRITICAL HIGH (ref 10–20)
WBC # BLD: 4.35 K/UL — SIGNIFICANT CHANGE UP (ref 3.8–10.5)
WBC # FLD AUTO: 4.35 K/UL — SIGNIFICANT CHANGE UP (ref 3.8–10.5)

## 2020-01-31 PROCEDURE — 99222 1ST HOSP IP/OBS MODERATE 55: CPT

## 2020-01-31 PROCEDURE — 99291 CRITICAL CARE FIRST HOUR: CPT | Mod: 24

## 2020-01-31 PROCEDURE — 71045 X-RAY EXAM CHEST 1 VIEW: CPT | Mod: 26

## 2020-01-31 PROCEDURE — 93970 EXTREMITY STUDY: CPT | Mod: 26

## 2020-01-31 PROCEDURE — 99233 SBSQ HOSP IP/OBS HIGH 50: CPT | Mod: GC

## 2020-01-31 RX ORDER — LEVETIRACETAM 250 MG/1
500 TABLET, FILM COATED ORAL
Refills: 0 | Status: DISCONTINUED | OUTPATIENT
Start: 2020-01-31 | End: 2020-02-03

## 2020-01-31 RX ORDER — HYDROMORPHONE HYDROCHLORIDE 2 MG/ML
0.5 INJECTION INTRAMUSCULAR; INTRAVENOUS; SUBCUTANEOUS ONCE
Refills: 0 | Status: DISCONTINUED | OUTPATIENT
Start: 2020-01-31 | End: 2020-01-31

## 2020-01-31 RX ADMIN — HYDROMORPHONE HYDROCHLORIDE 0.5 MILLIGRAM(S): 2 INJECTION INTRAMUSCULAR; INTRAVENOUS; SUBCUTANEOUS at 08:06

## 2020-01-31 RX ADMIN — OXYCODONE HYDROCHLORIDE 5 MILLIGRAM(S): 5 TABLET ORAL at 03:03

## 2020-01-31 RX ADMIN — CEFEPIME 100 MILLIGRAM(S): 1 INJECTION, POWDER, FOR SOLUTION INTRAMUSCULAR; INTRAVENOUS at 02:00

## 2020-01-31 RX ADMIN — OXYCODONE HYDROCHLORIDE 5 MILLIGRAM(S): 5 TABLET ORAL at 04:05

## 2020-01-31 RX ADMIN — Medication 5 MILLIGRAM(S): at 07:21

## 2020-01-31 RX ADMIN — CEFEPIME 100 MILLIGRAM(S): 1 INJECTION, POWDER, FOR SOLUTION INTRAMUSCULAR; INTRAVENOUS at 10:25

## 2020-01-31 RX ADMIN — HYDROMORPHONE HYDROCHLORIDE 0.5 MILLIGRAM(S): 2 INJECTION INTRAMUSCULAR; INTRAVENOUS; SUBCUTANEOUS at 15:00

## 2020-01-31 RX ADMIN — Medication 166.67 MILLIGRAM(S): at 02:50

## 2020-01-31 RX ADMIN — HYDROMORPHONE HYDROCHLORIDE 0.5 MILLIGRAM(S): 2 INJECTION INTRAMUSCULAR; INTRAVENOUS; SUBCUTANEOUS at 19:37

## 2020-01-31 RX ADMIN — LEVETIRACETAM 500 MILLIGRAM(S): 250 TABLET, FILM COATED ORAL at 17:37

## 2020-01-31 RX ADMIN — Medication 166.67 MILLIGRAM(S): at 14:36

## 2020-01-31 RX ADMIN — Medication 5 MILLIGRAM(S): at 17:37

## 2020-01-31 RX ADMIN — LEVETIRACETAM 420 MILLIGRAM(S): 250 TABLET, FILM COATED ORAL at 07:21

## 2020-01-31 RX ADMIN — HYDROMORPHONE HYDROCHLORIDE 0.5 MILLIGRAM(S): 2 INJECTION INTRAMUSCULAR; INTRAVENOUS; SUBCUTANEOUS at 07:16

## 2020-01-31 RX ADMIN — HYDROMORPHONE HYDROCHLORIDE 0.5 MILLIGRAM(S): 2 INJECTION INTRAMUSCULAR; INTRAVENOUS; SUBCUTANEOUS at 14:39

## 2020-01-31 RX ADMIN — CEFEPIME 100 MILLIGRAM(S): 1 INJECTION, POWDER, FOR SOLUTION INTRAMUSCULAR; INTRAVENOUS at 17:37

## 2020-01-31 RX ADMIN — SENNA PLUS 2 TABLET(S): 8.6 TABLET ORAL at 21:37

## 2020-01-31 NOTE — PROGRESS NOTE ADULT - SUBJECTIVE AND OBJECTIVE BOX
=================================  NEUROCRITICAL CARE ATTENDING NOTE  =================================    BRITTANY CARTER   MRN-[id number]  Summary:  48y/F who presented with headache and vision changes x 1 month.  Admitted for elective biopsy on . discharged, Course complicated by wound infection requiring exploraiton of wound on 10/28.  discharged, but still with drainage.  Readmitted today for craniectomy / wound debridement .     COURSE IN THE HOSPITAL:   Admitted to [facility], s/p OR   No significant events overnight.     Past Medical History: Brain mass Fibroids No pertinent past medical history   Allergies:  penicillins (Unknown) sulfa drugs (Unknown)  Home meds: colace levetiracetam 500 PO daily oxycodone 5mg PO q6h tramadol 50 mg PO BID     PHYSICAL EXAMINATION  T(C): 36.9 ( @ 09:00), Max: 36.9 ( @ 09:00) HR: 60 ( @ 09:00) (42 - 86) BP: 101/58 ( @ 09:00) (89/52 - 122/77) RR: 18 ( @ 09:00) (12 - 24)  SpO2: 98% ( @ 09:00) (98% - 100%)  NEUROLOGIC EXAMINATION:  Patient is awake, alert, fully oriented, pupils 2-3mm equal and briskly reactive to light, EOMs intact, moves all 4s with good strength  GENERAL: not intubated, not in cardiorespiratory distress  EENT:  anicteric  CARDIOVASCULAR: (+) S1 S2, normal rate and regular rhythm  PULMONARY: clear to auscultation bilaterally  ABDOMEN: soft, nontender with normoactive bowel sounds  EXTREMITIES: no edema  SKIN: no rash    LABS:  CAPILLARY BLOOD GLUCOSE 127              8.9    4.35  )-----------( 114 (after 1 unit plt, from 72)      ( 2020 06:42 )             27.3     141  |  108  |  9   ----------------------------<  123<H>  4.2   |  25  |  0.58    Ca    9.8      2020 06:42  Phos  3.9       Mg     1.8         TPro  6.1  /  Alb  3.5  /  TBili  0.5  /  DBili  x   /  AST  18  /  ALT  13  /  AlkPhos  64   @ 07: @ 07:00  IN: 2740 mL / OUT: 2480 mL / NET: 260 mL    Bacteriology:  CSF studies:  EEG:  Neuroimagin/01 MRI:  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, c/w gliomatosis   MRI: bilateral frontal / temporal lobe mass c/w diffuse low grade glioma   CT:  hypodensity bilateral frontal and temopral lobes R>L  Other imagin/23 Abd US:  hepatic steatosis   CT chest abd pelvis:  posterior R pelvis multiloculated cystic lesion - pelvic sonography; cystic lesion post R pelvis    MEDICATIONS:   cefepime 2G IV q8h vancomycin 1250 IV q12h levetiracetam 500 IV q12h bisacodyl 5mg PO q12h senna 2mg PO HS artificial tears hydromorphone PRN oxycodone PRN polyethylene glycol 17G daily PRN     IV FLUIDS: NS@75cc/hr  DRIPS:  DIET: regular   Lines:  Drains:    Wounds:    CODE STATUS:  Full Code                       GOALS OF CARE:  aggressive                      DISPOSITION:  ICU / stepdown

## 2020-01-31 NOTE — PROGRESS NOTE ADULT - SUBJECTIVE AND OBJECTIVE BOX
HPI:  Pls find her latest chart note attached below:  49F with aplastic anemia, s/p right craniotomy Stereotactic Biopsy of the R  frontal non-enhancing tumor by Dr. Garcia 2/2019, s/p RTT/Chemo in [place]  5/2019, c/b wound infection s/p 10/28 for exploration and washout of craniotomy  wound with plastics closure, receiving Vancomycin BID through L PICC line. (30 Jan 2020 12:09)      PAST MEDICAL & SURGICAL HISTORY:  Intracranial abscess and granuloma  Brain mass  Fibroids  History of surgery: brain biopsy  H/O hysterectomy with unilateral oophorectomy       Review of Systems:  · General	negative  · Skin/Breast	negative  · Ophthalmologic	negative  · ENMT	negative  · Respiratory and Thorax	negative  · Cardiovascular	negative  · Gastrointestinal	negative  · Genitourinary	negative  · Musculoskeletal Comments	negative  · Neurological	negative      MEDICATIONS  (STANDING):  bisacodyl 5 milliGRAM(s) Oral every 12 hours  cefepime   IVPB 2000 milliGRAM(s) IV Intermittent every 8 hours  levETIRAcetam 500 milliGRAM(s) Oral two times a day  senna 2 Tablet(s) Oral at bedtime  sodium chloride 0.9%. 1000 milliLiter(s) (75 mL/Hr) IV Continuous <Continuous>  vancomycin  IVPB 1250 milliGRAM(s) IV Intermittent every 12 hours    MEDICATIONS  (PRN):  acetaminophen   Tablet .. 650 milliGRAM(s) Oral every 6 hours PRN Temp greater or equal to 38C (100.4F), Mild Pain (1 - 3)  artificial  tears Solution 1 Drop(s) Both EYES four times a day PRN Dry Eyes  ondansetron Injectable 4 milliGRAM(s) IV Push every 6 hours PRN Nausea and/or Vomiting  oxyCODONE    IR 5 milliGRAM(s) Oral every 4 hours PRN Moderate Pain (4 - 6)  polyethylene glycol 3350 17 Gram(s) Oral daily PRN Constipation      Allergies    penicillins (Unknown)  sulfa drugs (Unknown)    Intolerances        SOCIAL HISTORY:    FAMILY HISTORY:      Vital Signs Last 24 Hrs  T(C): 36.6 (31 Jan 2020 21:36), Max: 36.9 (31 Jan 2020 09:00)  T(F): 97.8 (31 Jan 2020 21:36), Max: 98.5 (31 Jan 2020 09:00)  HR: 60 (31 Jan 2020 20:21) (42 - 72)  BP: 90/58 (31 Jan 2020 20:21) (81/44 - 117/55)  BP(mean): 70 (31 Jan 2020 20:21) (59 - 83)  RR: 16 (31 Jan 2020 20:21) (12 - 24)  SpO2: 100% (31 Jan 2020 20:21) (95% - 100%)     Physical Exam:  · Constitutional	detailed exam  · Constitutional Details	well-developed; well-groomed  · Eyes	EOMI; PERRL; no drainage or redness  · ENMT Comments	dry mucous membranes  · Respiratory	detailed exam  · Respiratory Comments	normal breath sounds at the lung bases bilaterally  · Cardiovascular	Regular rate & rhythm, normal S1, S2; no murmurs, gallops or rubs; no S3, S4  · Abd-Soft non tender  ·Ext-no edema, clubbing or cyanosis      LABS:                        8.9    4.35  )-----------( 114      ( 31 Jan 2020 06:42 )             27.3     01-31    141  |  108  |  9   ----------------------------<  123<H>  4.2   |  25  |  0.58    Ca    9.8      31 Jan 2020 06:42  Phos  3.9     01-31  Mg     1.8     01-31    TPro  6.1  /  Alb  3.5  /  TBili  0.5  /  DBili  x   /  AST  18  /  ALT  13  /  AlkPhos  64  01-30    PT/INR - ( 30 Jan 2020 15:28 )   PT: 14.7 sec;   INR: 1.28          PTT - ( 30 Jan 2020 15:28 )  PTT:25.9 sec      RADIOLOGY & ADDITIONAL STUDIES:

## 2020-01-31 NOTE — PROGRESS NOTE ADULT - ATTENDING COMMENTS
One OR culture now growing CoNS. Will await additional culture results prior to de-escalation of antibiotics. Continue vancomycin and cefepime; check vancomycin trough prior to 4th dose. PCN allergy skin testing.

## 2020-01-31 NOTE — PROGRESS NOTE ADULT - SUBJECTIVE AND OBJECTIVE BOX
Infectious Diseases Progress Note:    SUBJECTIVE: Patient seen and examined at bedside. Patient denies fever, chills, HA, nausea, vomiting, abdominal pain, dysuria, diarrhea.    G06.0    Wound dehiscence      Allergies    penicillins (Unknown)  sulfa drugs (Unknown)    Intolerances        ANTIBIOTICS/RELEVANT:  antimicrobials  cefepime   IVPB 2000 milliGRAM(s) IV Intermittent every 8 hours  vancomycin  IVPB 1250 milliGRAM(s) IV Intermittent every 12 hours    immunologic:      OTHER:  acetaminophen   Tablet .. 650 milliGRAM(s) Oral every 6 hours PRN  artificial  tears Solution 1 Drop(s) Both EYES four times a day PRN  bisacodyl 5 milliGRAM(s) Oral every 12 hours  HYDROmorphone  Injectable 0.5 milliGRAM(s) IV Push every 3 hours PRN  levETIRAcetam  IVPB 500 milliGRAM(s) IV Intermittent every 12 hours  ondansetron Injectable 4 milliGRAM(s) IV Push every 6 hours PRN  oxyCODONE    IR 5 milliGRAM(s) Oral every 4 hours PRN  polyethylene glycol 3350 17 Gram(s) Oral daily PRN  senna 2 Tablet(s) Oral at bedtime  sodium chloride 0.9%. 1000 milliLiter(s) IV Continuous <Continuous>      Objective:  Vital Signs Last 24 Hrs  T(C): 36.3 (31 Jan 2020 04:42), Max: 36.7 (30 Jan 2020 18:00)  T(F): 97.4 (31 Jan 2020 04:42), Max: 98 (30 Jan 2020 18:00)  HR: 44 (31 Jan 2020 08:00) (42 - 86)  BP: 92/45 (31 Jan 2020 08:00) (89/52 - 122/77)  BP(mean): 65 (31 Jan 2020 08:00) (65 - 94)  RR: 18 (31 Jan 2020 08:00) (12 - 24)  SpO2: 98% (31 Jan 2020 08:00) (98% - 100%)    PHYSICAL EXAM:  Constitutional: Well-developed, well nourished  Eyes: PERRLA, EOMI  Ear/Nose/Throat: no oral lesion, no sinus tenderness on percussion	  Neck:no JVD, no lymphadenopathy, supple  Respiratory: CTA bilateral  Cardiovascular: S1S2, RRR, no murmurs  Gastrointestinal: soft, NTND, (+) BS, no HSM  Extremities: no c/e/e  Skin: no rashes    LABS:                        8.9    4.35  )-----------( 114      ( 31 Jan 2020 06:42 )             27.3     01-31    141  |  108  |  9   ----------------------------<  123<H>  4.2   |  25  |  0.58    Ca    9.8      31 Jan 2020 06:42  Phos  3.9     01-31  Mg     1.8     01-31    TPro  6.1  /  Alb  3.5  /  TBili  0.5  /  DBili  x   /  AST  18  /  ALT  13  /  AlkPhos  64  01-30    PT/INR - ( 30 Jan 2020 15:28 )   PT: 14.7 sec;   INR: 1.28          PTT - ( 30 Jan 2020 15:28 )  PTT:25.9 sec      MICROBIOLOGY:    Culture - Tissue with Gram Stain (collected 01-30-20 @ 15:16)  Source: .Tissue epidural  Gram Stain (01-30-20 @ 16:56):    No organisms seen    Few WBC's  Preliminary Report (01-31-20 @ 08:28):    No growth to date    Culture - Surgical Swab (collected 01-30-20 @ 15:07)  Source: .Surgical Swab scalp #1  Gram Stain (01-30-20 @ 16:59):    No organisms seen    Rare WBC's  Preliminary Report (01-31-20 @ 08:41):    No growth to date    Culture - Surgical Swab (collected 01-30-20 @ 15:07)  Source: .Surgical Swab scalp #2  Gram Stain (01-30-20 @ 16:44):    No organisms seen    Rare WBC's  Preliminary Report (01-31-20 @ 08:41):    Culture in progress    Culture - Surgical Swab (collected 01-30-20 @ 15:07)  Source: .Surgical Swab scalp #1  Gram Stain (01-30-20 @ 16:40):    No organisms seen    Few White blood cells      Culture Results:   No growth to date (01-30 @ 15:16)  Culture Results:   Culture in progress (01-30 @ 15:07)  Culture Results:   No growth to date (01-30 @ 15:07)          RADIOLOGY & ADDITIONAL STUDIES: Infectious Diseases Progress Note:    SUBJECTIVE: Patient seen and examined at bedside. Patient denies fever, chills, HA, nausea, vomiting, abdominal pain, dysuria, diarrhea.    G06.0    Wound dehiscence      Allergies    penicillins (Unknown)  sulfa drugs (Unknown)    Intolerances        ANTIBIOTICS/RELEVANT:  antimicrobials  cefepime   IVPB 2000 milliGRAM(s) IV Intermittent every 8 hours  vancomycin  IVPB 1250 milliGRAM(s) IV Intermittent every 12 hours    immunologic:      OTHER:  acetaminophen   Tablet .. 650 milliGRAM(s) Oral every 6 hours PRN  artificial  tears Solution 1 Drop(s) Both EYES four times a day PRN  bisacodyl 5 milliGRAM(s) Oral every 12 hours  HYDROmorphone  Injectable 0.5 milliGRAM(s) IV Push every 3 hours PRN  levETIRAcetam  IVPB 500 milliGRAM(s) IV Intermittent every 12 hours  ondansetron Injectable 4 milliGRAM(s) IV Push every 6 hours PRN  oxyCODONE    IR 5 milliGRAM(s) Oral every 4 hours PRN  polyethylene glycol 3350 17 Gram(s) Oral daily PRN  senna 2 Tablet(s) Oral at bedtime  sodium chloride 0.9%. 1000 milliLiter(s) IV Continuous <Continuous>      Objective:  Vital Signs Last 24 Hrs  T(C): 36.3 (31 Jan 2020 04:42), Max: 36.7 (30 Jan 2020 18:00)  T(F): 97.4 (31 Jan 2020 04:42), Max: 98 (30 Jan 2020 18:00)  HR: 44 (31 Jan 2020 08:00) (42 - 86)  BP: 92/45 (31 Jan 2020 08:00) (89/52 - 122/77)  BP(mean): 65 (31 Jan 2020 08:00) (65 - 94)  RR: 18 (31 Jan 2020 08:00) (12 - 24)  SpO2: 98% (31 Jan 2020 08:00) (98% - 100%)    PHYSICAL EXAM:  Constitutional: Well-developed, well nourished, surgical site bandages clean, dry, drain in palce  Eyes: PERRLA, EOMI  Ear/Nose/Throat: no nuchal rigidity, or sinus tenderness  Neck: no JVD, no lymphadenopathy, supple  Respiratory: CTA bilaterally   Cardiovascular: S1S2, RRR, no murmurs  Gastrointestinal: soft, NTND, (+) BS, no HSM  Extremities: no c/e/e  Skin: no rashes    LABS:                        8.9    4.35  )-----------( 114      ( 31 Jan 2020 06:42 )             27.3     01-31    141  |  108  |  9   ----------------------------<  123<H>  4.2   |  25  |  0.58    Ca    9.8      31 Jan 2020 06:42  Phos  3.9     01-31  Mg     1.8     01-31    TPro  6.1  /  Alb  3.5  /  TBili  0.5  /  DBili  x   /  AST  18  /  ALT  13  /  AlkPhos  64  01-30    PT/INR - ( 30 Jan 2020 15:28 )   PT: 14.7 sec;   INR: 1.28          PTT - ( 30 Jan 2020 15:28 )  PTT:25.9 sec      MICROBIOLOGY:    Culture - Tissue with Gram Stain (collected 01-30-20 @ 15:16)  Source: .Tissue epidural  Gram Stain (01-30-20 @ 16:56):    No organisms seen    Few WBC's  Preliminary Report (01-31-20 @ 08:28):    No growth to date    Culture - Surgical Swab (collected 01-30-20 @ 15:07)  Source: .Surgical Swab scalp #1  Gram Stain (01-30-20 @ 16:59):    No organisms seen    Rare WBC's  Preliminary Report (01-31-20 @ 08:41):    No growth to date    Culture - Surgical Swab (collected 01-30-20 @ 15:07)  Source: .Surgical Swab scalp #2  Gram Stain (01-30-20 @ 16:44):    No organisms seen    Rare WBC's  Preliminary Report (01-31-20 @ 08:41):    Culture in progress    Culture - Surgical Swab (collected 01-30-20 @ 15:07)  Source: .Surgical Swab scalp #1  Gram Stain (01-30-20 @ 16:40):    No organisms seen    Few White blood cells      Culture Results:   No growth to date (01-30 @ 15:16)  Culture Results:   Culture in progress (01-30 @ 15:07)  Culture Results:   No growth to date (01-30 @ 15:07)          RADIOLOGY & ADDITIONAL STUDIES: Reviewed

## 2020-02-01 LAB
-  CEFAZOLIN: SIGNIFICANT CHANGE UP
-  CLINDAMYCIN: SIGNIFICANT CHANGE UP
-  ERYTHROMYCIN: SIGNIFICANT CHANGE UP
-  LINEZOLID: SIGNIFICANT CHANGE UP
-  OXACILLIN: SIGNIFICANT CHANGE UP
-  PENICILLIN: SIGNIFICANT CHANGE UP
-  RIFAMPIN: SIGNIFICANT CHANGE UP
-  TRIMETHOPRIM/SULFAMETHOXAZOLE: SIGNIFICANT CHANGE UP
-  VANCOMYCIN: SIGNIFICANT CHANGE UP
ANION GAP SERPL CALC-SCNC: 10 MMOL/L — SIGNIFICANT CHANGE UP (ref 5–17)
BASOPHILS # BLD AUTO: 0.03 K/UL — SIGNIFICANT CHANGE UP (ref 0–0.2)
BASOPHILS NFR BLD AUTO: 0.9 % — SIGNIFICANT CHANGE UP (ref 0–2)
BUN SERPL-MCNC: 10 MG/DL — SIGNIFICANT CHANGE UP (ref 7–23)
CALCIUM SERPL-MCNC: 9.4 MG/DL — SIGNIFICANT CHANGE UP (ref 8.4–10.5)
CHLORIDE SERPL-SCNC: 106 MMOL/L — SIGNIFICANT CHANGE UP (ref 96–108)
CO2 SERPL-SCNC: 26 MMOL/L — SIGNIFICANT CHANGE UP (ref 22–31)
CREAT SERPL-MCNC: 0.68 MG/DL — SIGNIFICANT CHANGE UP (ref 0.5–1.3)
CULTURE RESULTS: SIGNIFICANT CHANGE UP
EOSINOPHIL # BLD AUTO: 0.05 K/UL — SIGNIFICANT CHANGE UP (ref 0–0.5)
EOSINOPHIL NFR BLD AUTO: 1.7 % — SIGNIFICANT CHANGE UP (ref 0–6)
GLUCOSE SERPL-MCNC: 84 MG/DL — SIGNIFICANT CHANGE UP (ref 70–99)
HCT VFR BLD CALC: 29.9 % — LOW (ref 34.5–45)
HGB BLD-MCNC: 9.4 G/DL — LOW (ref 11.5–15.5)
LYMPHOCYTES # BLD AUTO: 0.95 K/UL — LOW (ref 1–3.3)
LYMPHOCYTES # BLD AUTO: 33.6 % — SIGNIFICANT CHANGE UP (ref 13–44)
MAGNESIUM SERPL-MCNC: 1.9 MG/DL — SIGNIFICANT CHANGE UP (ref 1.6–2.6)
MCHC RBC-ENTMCNC: 31.4 GM/DL — LOW (ref 32–36)
MCHC RBC-ENTMCNC: 33.1 PG — SIGNIFICANT CHANGE UP (ref 27–34)
MCV RBC AUTO: 105.3 FL — HIGH (ref 80–100)
METHOD TYPE: SIGNIFICANT CHANGE UP
MONOCYTES # BLD AUTO: 0.17 K/UL — SIGNIFICANT CHANGE UP (ref 0–0.9)
MONOCYTES NFR BLD AUTO: 6 % — SIGNIFICANT CHANGE UP (ref 2–14)
NEUTROPHILS # BLD AUTO: 1.64 K/UL — LOW (ref 1.8–7.4)
NEUTROPHILS NFR BLD AUTO: 57.8 % — SIGNIFICANT CHANGE UP (ref 43–77)
NRBC # BLD: 0 /100 WBCS — SIGNIFICANT CHANGE UP (ref 0–0)
ORGANISM # SPEC MICROSCOPIC CNT: SIGNIFICANT CHANGE UP
ORGANISM # SPEC MICROSCOPIC CNT: SIGNIFICANT CHANGE UP
PHOSPHATE SERPL-MCNC: 3 MG/DL — SIGNIFICANT CHANGE UP (ref 2.5–4.5)
PLATELET # BLD AUTO: 121 K/UL — LOW (ref 150–400)
POTASSIUM SERPL-MCNC: 3.9 MMOL/L — SIGNIFICANT CHANGE UP (ref 3.5–5.3)
POTASSIUM SERPL-SCNC: 3.9 MMOL/L — SIGNIFICANT CHANGE UP (ref 3.5–5.3)
RBC # BLD: 2.84 M/UL — LOW (ref 3.8–5.2)
RBC # FLD: 13.8 % — SIGNIFICANT CHANGE UP (ref 10.3–14.5)
SODIUM SERPL-SCNC: 142 MMOL/L — SIGNIFICANT CHANGE UP (ref 135–145)
SPECIMEN SOURCE: SIGNIFICANT CHANGE UP
VANCOMYCIN TROUGH SERPL-MCNC: 16.2 UG/ML — SIGNIFICANT CHANGE UP (ref 10–20)
VANCOMYCIN TROUGH SERPL-MCNC: 16.8 UG/ML — SIGNIFICANT CHANGE UP (ref 10–20)
WBC # BLD: 2.83 K/UL — LOW (ref 3.8–10.5)
WBC # FLD AUTO: 2.83 K/UL — LOW (ref 3.8–10.5)

## 2020-02-01 PROCEDURE — 99233 SBSQ HOSP IP/OBS HIGH 50: CPT

## 2020-02-01 PROCEDURE — 99232 SBSQ HOSP IP/OBS MODERATE 35: CPT

## 2020-02-01 PROCEDURE — 71045 X-RAY EXAM CHEST 1 VIEW: CPT | Mod: 26

## 2020-02-01 RX ORDER — HYDROMORPHONE HYDROCHLORIDE 2 MG/ML
0.5 INJECTION INTRAMUSCULAR; INTRAVENOUS; SUBCUTANEOUS EVERY 4 HOURS
Refills: 0 | Status: DISCONTINUED | OUTPATIENT
Start: 2020-02-01 | End: 2020-02-03

## 2020-02-01 RX ADMIN — Medication 166.67 MILLIGRAM(S): at 02:06

## 2020-02-01 RX ADMIN — OXYCODONE HYDROCHLORIDE 5 MILLIGRAM(S): 5 TABLET ORAL at 01:51

## 2020-02-01 RX ADMIN — Medication 5 MILLIGRAM(S): at 06:47

## 2020-02-01 RX ADMIN — SODIUM CHLORIDE 75 MILLILITER(S): 9 INJECTION INTRAMUSCULAR; INTRAVENOUS; SUBCUTANEOUS at 13:07

## 2020-02-01 RX ADMIN — LEVETIRACETAM 500 MILLIGRAM(S): 250 TABLET, FILM COATED ORAL at 06:47

## 2020-02-01 RX ADMIN — HYDROMORPHONE HYDROCHLORIDE 0.5 MILLIGRAM(S): 2 INJECTION INTRAMUSCULAR; INTRAVENOUS; SUBCUTANEOUS at 08:40

## 2020-02-01 RX ADMIN — Medication 5 MILLIGRAM(S): at 18:06

## 2020-02-01 RX ADMIN — HYDROMORPHONE HYDROCHLORIDE 0.5 MILLIGRAM(S): 2 INJECTION INTRAMUSCULAR; INTRAVENOUS; SUBCUTANEOUS at 14:31

## 2020-02-01 RX ADMIN — CEFEPIME 100 MILLIGRAM(S): 1 INJECTION, POWDER, FOR SOLUTION INTRAMUSCULAR; INTRAVENOUS at 02:05

## 2020-02-01 RX ADMIN — HYDROMORPHONE HYDROCHLORIDE 0.5 MILLIGRAM(S): 2 INJECTION INTRAMUSCULAR; INTRAVENOUS; SUBCUTANEOUS at 08:55

## 2020-02-01 RX ADMIN — SODIUM CHLORIDE 75 MILLILITER(S): 9 INJECTION INTRAMUSCULAR; INTRAVENOUS; SUBCUTANEOUS at 01:44

## 2020-02-01 RX ADMIN — CEFEPIME 100 MILLIGRAM(S): 1 INJECTION, POWDER, FOR SOLUTION INTRAMUSCULAR; INTRAVENOUS at 10:27

## 2020-02-01 RX ADMIN — SENNA PLUS 2 TABLET(S): 8.6 TABLET ORAL at 22:19

## 2020-02-01 RX ADMIN — Medication 166.67 MILLIGRAM(S): at 14:31

## 2020-02-01 RX ADMIN — LEVETIRACETAM 500 MILLIGRAM(S): 250 TABLET, FILM COATED ORAL at 18:07

## 2020-02-01 NOTE — PROGRESS NOTE ADULT - SUBJECTIVE AND OBJECTIVE BOX
INTERVAL HPI/OVERNIGHT EVENTS: HA improved. Drain removed.    CONSTITUTIONAL:  Negative fever or chills, feels well, good appetite  EYES:  Negative  blurry vision or double vision  CARDIOVASCULAR:  Negative for chest pain or palpitations  RESPIRATORY:  Negative for cough, wheezing, or SOB   GASTROINTESTINAL:  Negative for nausea, vomiting, diarrhea, constipation, or abdominal pain  GENITOURINARY:  Negative frequency, urgency or dysuria  NEUROLOGIC:  No headache, confusion, dizziness, lightheadedness      ANTIBIOTICS/RELEVANT:    MEDICATIONS  (STANDING):  bisacodyl 5 milliGRAM(s) Oral every 12 hours  levETIRAcetam 500 milliGRAM(s) Oral two times a day  senna 2 Tablet(s) Oral at bedtime  sodium chloride 0.9%. 1000 milliLiter(s) (75 mL/Hr) IV Continuous <Continuous>  vancomycin  IVPB 1250 milliGRAM(s) IV Intermittent every 12 hours    MEDICATIONS  (PRN):  acetaminophen   Tablet .. 650 milliGRAM(s) Oral every 6 hours PRN Temp greater or equal to 38C (100.4F), Mild Pain (1 - 3)  artificial  tears Solution 1 Drop(s) Both EYES four times a day PRN Dry Eyes  HYDROmorphone  Injectable 0.5 milliGRAM(s) IV Push every 4 hours PRN break through pain  ondansetron Injectable 4 milliGRAM(s) IV Push every 6 hours PRN Nausea and/or Vomiting  oxyCODONE    IR 5 milliGRAM(s) Oral every 4 hours PRN Moderate Pain (4 - 6)  polyethylene glycol 3350 17 Gram(s) Oral daily PRN Constipation        Vital Signs Last 24 Hrs  T(C): 36.2 (01 Feb 2020 14:43), Max: 36.6 (31 Jan 2020 21:36)  T(F): 97.2 (01 Feb 2020 14:43), Max: 97.9 (01 Feb 2020 01:46)  HR: 62 (01 Feb 2020 08:43) (50 - 64)  BP: 88/55 (01 Feb 2020 08:43) (81/44 - 98/59)  BP(mean): 66 (01 Feb 2020 08:43) (59 - 74)  RR: 18 (01 Feb 2020 08:43) (14 - 18)  SpO2: 98% (01 Feb 2020 08:43) (97% - 100%)    PHYSICAL EXAM:  Constitutional:Well-developed, well nourished  Eyes:JOSSY, EOMI  Ear/Nose/Throat: no oral lesion, no sinus tenderness on percussion	  Neck:no JVD, no lymphadenopathy, supple  Respiratory: CTA layla  Cardiovascular: S1S2 RRR, no murmurs  Gastrointestinal:soft, (+) BS, no HSM  Extremities:no e/e/c  Vascular: DP Pulse:	right normal; left normal      LABS:                        9.4    2.83  )-----------( 121      ( 01 Feb 2020 08:42 )             29.9     02-01    142  |  106  |  10  ----------------------------<  84  3.9   |  26  |  0.68    Ca    9.4      01 Feb 2020 08:42  Phos  3.0     02-01  Mg     1.9     02-01            MICROBIOLOGY: Culture - Surgical Swab (01.30.20 @ 15:07)    Gram Stain:   No organisms seen  Few White blood cells    -  Cefazolin: R <=4    -  RIF- Rifampin: S <=1 Should not be used as monotherapy    -  Trimethoprim/Sulfamethoxazole: S <=0.5/9.5    -  Vancomycin: S 2    -  Clindamycin: R >4    -  Erythromycin: R >4    -  Linezolid: S 2    -  Penicillin: R >8    -  Oxacillin: R >2    Specimen Source: .Surgical Swab scalp #1    Culture Results:   Moderate Staphylococcus epidermidis    Organism Identification: Staphylococcus epidermidis    Organism: Staphylococcus epidermidis    Method Type: JING        RADIOLOGY & ADDITIONAL STUDIES: reviewed

## 2020-02-01 NOTE — PROGRESS NOTE ADULT - PROBLEM SELECTOR PLAN 1
WBC 2.8, 82% PMN yesterday, ANC around 2000, NO need for neupogen today...    please obtain diff with every CBC...give neupogen when ANC< 1500

## 2020-02-01 NOTE — PROGRESS NOTE ADULT - SUBJECTIVE AND OBJECTIVE BOX
Pt seen and examined.   Pain controlled with dilaudid.     Exam:  AVSS. NAD.   Drain 20mL/last shift  Incision c/d/i. No fluctuance or drainage.   Dressings changed.

## 2020-02-01 NOTE — PROGRESS NOTE ADULT - SUBJECTIVE AND OBJECTIVE BOX
HEALTH ISSUES - PROBLEM Dx:  Pancytopenia: Pancytopenia  Wound dehiscence          CHEMOTHERAPY REGIMEN:        Day:                          Diet:  Protocol:                                    IVF:      MEDICATIONS  (STANDING):  bisacodyl 5 milliGRAM(s) Oral every 12 hours  cefepime   IVPB 2000 milliGRAM(s) IV Intermittent every 8 hours  levETIRAcetam 500 milliGRAM(s) Oral two times a day  senna 2 Tablet(s) Oral at bedtime  sodium chloride 0.9%. 1000 milliLiter(s) (75 mL/Hr) IV Continuous <Continuous>  vancomycin  IVPB 1250 milliGRAM(s) IV Intermittent every 12 hours    MEDICATIONS  (PRN):  acetaminophen   Tablet .. 650 milliGRAM(s) Oral every 6 hours PRN Temp greater or equal to 38C (100.4F), Mild Pain (1 - 3)  artificial  tears Solution 1 Drop(s) Both EYES four times a day PRN Dry Eyes  HYDROmorphone  Injectable 0.5 milliGRAM(s) IV Push every 4 hours PRN break through pain  ondansetron Injectable 4 milliGRAM(s) IV Push every 6 hours PRN Nausea and/or Vomiting  oxyCODONE    IR 5 milliGRAM(s) Oral every 4 hours PRN Moderate Pain (4 - 6)  polyethylene glycol 3350 17 Gram(s) Oral daily PRN Constipation      Allergies    penicillins (Unknown)  sulfa drugs (Unknown)    Intolerances        DVT Prophylaxis: [ ] YES [ ] NO      Antibiotics: [ ] YES [ ] NO    Pain Scale (1-10):       Location:    Vital Signs Last 24 Hrs  T(C): 36.4 (01 Feb 2020 10:00), Max: 36.6 (31 Jan 2020 14:00)  T(F): 97.6 (01 Feb 2020 10:00), Max: 97.9 (31 Jan 2020 14:00)  HR: 62 (01 Feb 2020 08:43) (50 - 64)  BP: 88/55 (01 Feb 2020 08:43) (81/44 - 98/59)  BP(mean): 66 (01 Feb 2020 08:43) (59 - 74)  RR: 18 (01 Feb 2020 08:43) (14 - 18)  SpO2: 98% (01 Feb 2020 08:43) (97% - 100%)    Drug Dosing Weight  Height (cm): 175.26 (30 Jan 2020 08:49)  Weight (kg): 77.2 (30 Jan 2020 08:49)  BMI (kg/m2): 25.1 (30 Jan 2020 08:49)  BSA (m2): 1.93 (30 Jan 2020 08:49)     Physical Exam:  · Constitutional	detailed exam  · Constitutional Details	well-developed; well-groomed  · Eyes	EOMI; PERRL; no drainage or redness  · ENMT Comments	dry mucous membranes  · Respiratory	detailed exam  · Respiratory Comments	normal breath sounds at the lung bases bilaterally  · Cardiovascular	Regular rate & rhythm, normal S1, S2; no murmurs, gallops or rubs; no S3, S4  · Abd-Soft non tender  ·Ext-no edema, clubbing or cyanosis    URINARY CATHETER: [ ] YES [ ] NO     LABS:  CBC Full  -  ( 01 Feb 2020 08:42 )  WBC Count : 2.83 K/uL  RBC Count : 2.84 M/uL  Hemoglobin : 9.4 g/dL  Hematocrit : 29.9 %  Platelet Count - Automated : 121 K/uL  Mean Cell Volume : 105.3 fl  Mean Cell Hemoglobin : 33.1 pg  Mean Cell Hemoglobin Concentration : 31.4 gm/dL  Auto Neutrophil # : x  Auto Lymphocyte # : x  Auto Monocyte # : x  Auto Eosinophil # : x  Auto Basophil # : x  Auto Neutrophil % : x  Auto Lymphocyte % : x  Auto Monocyte % : x  Auto Eosinophil % : x  Auto Basophil % : x    02-01    142  |  106  |  10  ----------------------------<  84  3.9   |  26  |  0.68    Ca    9.4      01 Feb 2020 08:42  Phos  3.0     02-01  Mg     1.9     02-01    TPro  6.1  /  Alb  3.5  /  TBili  0.5  /  DBili  x   /  AST  18  /  ALT  13  /  AlkPhos  64  01-30    PT/INR - ( 30 Jan 2020 15:28 )   PT: 14.7 sec;   INR: 1.28          PTT - ( 30 Jan 2020 15:28 )  PTT:25.9 sec      CULTURES:    RADIOLOGY & ADDITIONAL STUDIES:

## 2020-02-01 NOTE — PROGRESS NOTE ADULT - SUBJECTIVE AND OBJECTIVE BOX
HPI:  Pls find her latest chart note attached below:  49F with aplastic anemia, s/p right craniotomy Stereotactic Biopsy of the R  frontal non-enhancing tumor by Dr. Garcia 2/2019, s/p RTT/Chemo in [place]  5/2019, c/b wound infection s/p 10/28 for exploration and washout of craniotomy  wound with plastics closure, receiving Vancomycin BID through L PICC line. (30 Jan 2020 12:09)    Hospital Course:  POD# 0 S/P Right frontal craniectomy/wound washout  POD 1: WILMA overnight  POD 2: WILMA overnight    OVERNIGHT EVENTS:  Vital Signs Last 24 Hrs  T(C): 36.6 (31 Jan 2020 21:36), Max: 36.9 (31 Jan 2020 09:00)  T(F): 97.8 (31 Jan 2020 21:36), Max: 98.5 (31 Jan 2020 09:00)  HR: 60 (31 Jan 2020 20:21) (42 - 64)  BP: 90/58 (31 Jan 2020 20:21) (81/44 - 117/55)  BP(mean): 70 (31 Jan 2020 20:21) (59 - 83)  RR: 16 (31 Jan 2020 20:21) (14 - 20)  SpO2: 100% (31 Jan 2020 20:21) (95% - 100%)    I&O's Summary    30 Jan 2020 07:01  -  31 Jan 2020 07:00  --------------------------------------------------------  IN: 2740 mL / OUT: 2480 mL / NET: 260 mL    31 Jan 2020 07:01  -  01 Feb 2020 00:14  --------------------------------------------------------  IN: 1625 mL / OUT: 720 mL / NET: 905 mL        PHYSICAL EXAM:  nonfocal.  SILT throughout.  No pronator drift, no dysmetria.    Right frontal cranial incision with dressing intact.  HMV drain with sanguinous drainage.  Cor RRR S1, S2+  Pulm CTA B/L  Abd Soft, NT/ND. +BS  Extrem: calves soft, nontender. no edema    TUBES/LINES:  [] Elliott  [] Lumbar Drain  [x] Wound Drains  [] Others      DIET:  [] NPO  [x] Mechanical  [] Tube feeds    LABS:                        8.9    4.35  )-----------( 114      ( 31 Jan 2020 06:42 )             27.3     01-31    141  |  108  |  9   ----------------------------<  123<H>  4.2   |  25  |  0.58    Ca    9.8      31 Jan 2020 06:42  Phos  3.9     01-31  Mg     1.8     01-31    TPro  6.1  /  Alb  3.5  /  TBili  0.5  /  DBili  x   /  AST  18  /  ALT  13  /  AlkPhos  64  01-30    PT/INR - ( 30 Jan 2020 15:28 )   PT: 14.7 sec;   INR: 1.28          PTT - ( 30 Jan 2020 15:28 )  PTT:25.9 sec        CAPILLARY BLOOD GLUCOSE          Drug Levels: [] N/A  Vancomycin Level, Trough: 25.2 ug/mL (01-31 @ 06:42)    CSF Analysis: [] N/A      Allergies    penicillins (Unknown)  sulfa drugs (Unknown)    Intolerances      MEDICATIONS:  Antibiotics:  cefepime   IVPB 2000 milliGRAM(s) IV Intermittent every 8 hours  vancomycin  IVPB 1250 milliGRAM(s) IV Intermittent every 12 hours    Neuro:  acetaminophen   Tablet .. 650 milliGRAM(s) Oral every 6 hours PRN  levETIRAcetam 500 milliGRAM(s) Oral two times a day  ondansetron Injectable 4 milliGRAM(s) IV Push every 6 hours PRN  oxyCODONE    IR 5 milliGRAM(s) Oral every 4 hours PRN    Anticoagulation:    OTHER:  artificial  tears Solution 1 Drop(s) Both EYES four times a day PRN  bisacodyl 5 milliGRAM(s) Oral every 12 hours  polyethylene glycol 3350 17 Gram(s) Oral daily PRN  senna 2 Tablet(s) Oral at bedtime    IVF:  sodium chloride 0.9%. 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 Byron Fitzpatrick and Plastics Dr. Vignesh Anaya.      Plan: - Advance diet as tolerated   - Neuro checks   - Vitals checks   - Admit to ICU   - Imaging only if change in exam   - ID consult: vanc, cefepime  - needs picc   - Heme consult Dr. Luong for hx pancytopenia   - Keppra prophylaxis for 5 days   - HMV drain is epidural, to continue on self-suction   - Admission surveillance dopplers b/l LE   - Pain control PRN    All above d/w Dr. Collazo and Dr Fitzpatrick.

## 2020-02-02 LAB
-  CEFAZOLIN: SIGNIFICANT CHANGE UP
-  CLINDAMYCIN: SIGNIFICANT CHANGE UP
-  ERYTHROMYCIN: SIGNIFICANT CHANGE UP
-  LINEZOLID: SIGNIFICANT CHANGE UP
-  OXACILLIN: SIGNIFICANT CHANGE UP
-  PENICILLIN: SIGNIFICANT CHANGE UP
-  RIFAMPIN: SIGNIFICANT CHANGE UP
-  TRIMETHOPRIM/SULFAMETHOXAZOLE: SIGNIFICANT CHANGE UP
-  VANCOMYCIN: SIGNIFICANT CHANGE UP
ANION GAP SERPL CALC-SCNC: 8 MMOL/L — SIGNIFICANT CHANGE UP (ref 5–17)
BASOPHILS # BLD AUTO: 0.04 K/UL — SIGNIFICANT CHANGE UP (ref 0–0.2)
BASOPHILS NFR BLD AUTO: 1.8 % — SIGNIFICANT CHANGE UP (ref 0–2)
BUN SERPL-MCNC: 7 MG/DL — SIGNIFICANT CHANGE UP (ref 7–23)
CALCIUM SERPL-MCNC: 8.9 MG/DL — SIGNIFICANT CHANGE UP (ref 8.4–10.5)
CHLORIDE SERPL-SCNC: 106 MMOL/L — SIGNIFICANT CHANGE UP (ref 96–108)
CO2 SERPL-SCNC: 26 MMOL/L — SIGNIFICANT CHANGE UP (ref 22–31)
CREAT SERPL-MCNC: 0.64 MG/DL — SIGNIFICANT CHANGE UP (ref 0.5–1.3)
CULTURE RESULTS: SIGNIFICANT CHANGE UP
EOSINOPHIL # BLD AUTO: 0 K/UL — SIGNIFICANT CHANGE UP (ref 0–0.5)
EOSINOPHIL NFR BLD AUTO: 0 % — SIGNIFICANT CHANGE UP (ref 0–6)
GLUCOSE SERPL-MCNC: 101 MG/DL — HIGH (ref 70–99)
HCT VFR BLD CALC: 25.8 % — LOW (ref 34.5–45)
HGB BLD-MCNC: 8.5 G/DL — LOW (ref 11.5–15.5)
LYMPHOCYTES # BLD AUTO: 0.87 K/UL — LOW (ref 1–3.3)
LYMPHOCYTES # BLD AUTO: 37.5 % — SIGNIFICANT CHANGE UP (ref 13–44)
MCHC RBC-ENTMCNC: 32.9 GM/DL — SIGNIFICANT CHANGE UP (ref 32–36)
MCHC RBC-ENTMCNC: 33.7 PG — SIGNIFICANT CHANGE UP (ref 27–34)
MCV RBC AUTO: 102.4 FL — HIGH (ref 80–100)
METHOD TYPE: SIGNIFICANT CHANGE UP
MONOCYTES # BLD AUTO: 0.08 K/UL — SIGNIFICANT CHANGE UP (ref 0–0.9)
MONOCYTES NFR BLD AUTO: 3.6 % — SIGNIFICANT CHANGE UP (ref 2–14)
NEUTROPHILS # BLD AUTO: 1.29 K/UL — LOW (ref 1.8–7.4)
NEUTROPHILS NFR BLD AUTO: 55.3 % — SIGNIFICANT CHANGE UP (ref 43–77)
NRBC # BLD: SIGNIFICANT CHANGE UP /100 WBCS (ref 0–0)
ORGANISM # SPEC MICROSCOPIC CNT: SIGNIFICANT CHANGE UP
ORGANISM # SPEC MICROSCOPIC CNT: SIGNIFICANT CHANGE UP
PLATELET # BLD AUTO: 92 K/UL — LOW (ref 150–400)
POTASSIUM SERPL-MCNC: 3.4 MMOL/L — LOW (ref 3.5–5.3)
POTASSIUM SERPL-SCNC: 3.4 MMOL/L — LOW (ref 3.5–5.3)
RBC # BLD: 2.52 M/UL — LOW (ref 3.8–5.2)
RBC # FLD: 13.3 % — SIGNIFICANT CHANGE UP (ref 10.3–14.5)
SODIUM SERPL-SCNC: 140 MMOL/L — SIGNIFICANT CHANGE UP (ref 135–145)
SPECIMEN SOURCE: SIGNIFICANT CHANGE UP
WBC # BLD: 2.33 K/UL — LOW (ref 3.8–10.5)
WBC # FLD AUTO: 2.33 K/UL — LOW (ref 3.8–10.5)

## 2020-02-02 PROCEDURE — 99232 SBSQ HOSP IP/OBS MODERATE 35: CPT | Mod: NC

## 2020-02-02 RX ORDER — OXYCODONE HYDROCHLORIDE 5 MG/1
10 TABLET ORAL EVERY 4 HOURS
Refills: 0 | Status: DISCONTINUED | OUTPATIENT
Start: 2020-02-02 | End: 2020-02-03

## 2020-02-02 RX ORDER — SODIUM CHLORIDE 9 MG/ML
500 INJECTION INTRAMUSCULAR; INTRAVENOUS; SUBCUTANEOUS ONCE
Refills: 0 | Status: COMPLETED | OUTPATIENT
Start: 2020-02-02 | End: 2020-02-02

## 2020-02-02 RX ORDER — POTASSIUM CHLORIDE 20 MEQ
40 PACKET (EA) ORAL EVERY 4 HOURS
Refills: 0 | Status: COMPLETED | OUTPATIENT
Start: 2020-02-02 | End: 2020-02-02

## 2020-02-02 RX ADMIN — OXYCODONE HYDROCHLORIDE 5 MILLIGRAM(S): 5 TABLET ORAL at 09:34

## 2020-02-02 RX ADMIN — Medication 5 MILLIGRAM(S): at 06:34

## 2020-02-02 RX ADMIN — LEVETIRACETAM 500 MILLIGRAM(S): 250 TABLET, FILM COATED ORAL at 06:34

## 2020-02-02 RX ADMIN — Medication 166.67 MILLIGRAM(S): at 03:20

## 2020-02-02 RX ADMIN — Medication 166.67 MILLIGRAM(S): at 13:56

## 2020-02-02 RX ADMIN — HYDROMORPHONE HYDROCHLORIDE 0.5 MILLIGRAM(S): 2 INJECTION INTRAMUSCULAR; INTRAVENOUS; SUBCUTANEOUS at 22:39

## 2020-02-02 RX ADMIN — ONDANSETRON 4 MILLIGRAM(S): 8 TABLET, FILM COATED ORAL at 16:08

## 2020-02-02 RX ADMIN — OXYCODONE HYDROCHLORIDE 5 MILLIGRAM(S): 5 TABLET ORAL at 10:30

## 2020-02-02 RX ADMIN — Medication 40 MILLIEQUIVALENT(S): at 09:34

## 2020-02-02 RX ADMIN — Medication 40 MILLIEQUIVALENT(S): at 13:57

## 2020-02-02 RX ADMIN — SENNA PLUS 2 TABLET(S): 8.6 TABLET ORAL at 21:48

## 2020-02-02 RX ADMIN — LEVETIRACETAM 500 MILLIGRAM(S): 250 TABLET, FILM COATED ORAL at 17:08

## 2020-02-02 RX ADMIN — SODIUM CHLORIDE 1000 MILLILITER(S): 9 INJECTION INTRAMUSCULAR; INTRAVENOUS; SUBCUTANEOUS at 21:47

## 2020-02-02 RX ADMIN — HYDROMORPHONE HYDROCHLORIDE 0.5 MILLIGRAM(S): 2 INJECTION INTRAMUSCULAR; INTRAVENOUS; SUBCUTANEOUS at 22:54

## 2020-02-02 NOTE — PHYSICAL THERAPY INITIAL EVALUATION ADULT - SENSORY TESTS
CN Testing: B/L buccinator intact; smile symmetrical; tongue protrusion at midline; B/L eyes open/close intact; Shoulder elevation: intact bilaterally; Vision H-Test: bilateral tracking and smooth pursuit intact; Convergence/Divergence: intact; Vision Quadrant Test: intact bilaterally

## 2020-02-02 NOTE — PROGRESS NOTE ADULT - SUBJECTIVE AND OBJECTIVE BOX
HPI:  Pls find her latest chart note attached below:  49F with aplastic anemia, s/p right craniotomy Stereotactic Biopsy of the R  frontal non-enhancing tumor by Dr. Garcia 2/2019, s/p RTT/Chemo in [place]  5/2019, c/b wound infection s/p 10/28 for exploration and washout of craniotomy  wound with plastics closure, receiving Vancomycin BID through L PICC line. (30 Jan 2020 12:09)    Hospital Course:  3/30 POD# 0 S/P Right frontal craniectomy/wound washout  3/31 POD 1: DIAMOND overnight  2/1 POD 2: DIAMOND overnight  2/2 POD#3 Diamond overnight, Vanco until 3/11, PICC line pending, Dr Cherise bob consult following for Pancytopenia       ICU Vital Signs Last 24 Hrs  T(C): 36.3 (01 Feb 2020 22:20), Max: 36.9 (01 Feb 2020 18:00)  T(F): 97.4 (01 Feb 2020 22:20), Max: 98.5 (01 Feb 2020 18:00)  HR: 62 (02 Feb 2020 00:03) (52 - 62)  BP: 102/59 (02 Feb 2020 00:03) (88/55 - 102/59)  BP(mean): 74 (02 Feb 2020 00:03) (65 - 74)  ABP: --  ABP(mean): --  RR: 18 (02 Feb 2020 00:03) (16 - 18)  SpO2: 100% (02 Feb 2020 00:03) (98% - 100%)          PHYSICAL EXAM:  nonfocal.  SILT throughout.  No pronator drift, no dysmetria.    Right frontal cranial incision with dressing intact.  HMV drain with sanguinous drainage.  Cor RRR S1, S2+  Pulm CTA B/L  Abd Soft, NT/ND. +BS  Extrem: calves soft, nontender. no edema    TUBES/LINES:  [] Elliott  [] Lumbar Drain  [x] Wound Drains  [] Others      DIET:  [] NPO  [x] Mechanical  [] Tube feeds    LABS:           Pending AM Labs Collection        CAPILLARY BLOOD GLUCOSE          Drug Levels: [] N/A  Vancomycin Level, Trough: 25.2 ug/mL (01-31 @ 06:42)    CSF Analysis: [] N/A      Allergies    penicillins (Unknown)  sulfa drugs (Unknown)    Intolerances      MEDICATIONS:  Antibiotics:  cefepime   IVPB 2000 milliGRAM(s) IV Intermittent every 8 hours  vancomycin  IVPB 1250 milliGRAM(s) IV Intermittent every 12 hours    Neuro:  acetaminophen   Tablet .. 650 milliGRAM(s) Oral every 6 hours PRN  levETIRAcetam 500 milliGRAM(s) Oral two times a day  ondansetron Injectable 4 milliGRAM(s) IV Push every 6 hours PRN  oxyCODONE    IR 5 milliGRAM(s) Oral every 4 hours PRN    Anticoagulation:    OTHER:  artificial  tears Solution 1 Drop(s) Both EYES four times a day PRN  bisacodyl 5 milliGRAM(s) Oral every 12 hours  polyethylene glycol 3350 17 Gram(s) Oral daily PRN  senna 2 Tablet(s) Oral at bedtime    IVF:  sodium chloride 0.9%. 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 Byron Fitzpatrick and Plastics Dr. Vignesh Anaya.      Plan:   - PICC line placement pending   - Neuro checks   - Vitals checks   - Imaging only if change in exam   - ID consult: vanc until 3/11, OR Cx Staph Epid  - needs picc   - Heme consult Dr. Luong for hx pancytopenia, recs are appreciated: CBC w/ Diff in AM    - Keppra prophylaxis for 5 days   - Pain control PRN    All above d/w Dr. Collazo and Dr Fitzpatrick.

## 2020-02-02 NOTE — PROGRESS NOTE ADULT - SUBJECTIVE AND OBJECTIVE BOX
Pt seen and examined.   No acute events.    Exam:  AVSS. NAD.   Drain 20mL/24 hours x 2 days  Incision c/d/i. No fluctuance or drainage.

## 2020-02-02 NOTE — PHYSICAL THERAPY INITIAL EVALUATION ADULT - GENERAL OBSERVATIONS, REHAB EVAL
Pt received supine in bed with +hep-lock, off tele, NAD, Right frontal cranial incision cover with dressing C/D/I. Pt left as found, NAD, call bell in reach, RN awares.

## 2020-02-02 NOTE — PHYSICAL THERAPY INITIAL EVALUATION ADULT - ADDITIONAL COMMENTS
Pt lives alone in an apartment, denies stair. Prior to admission, pt ambulated independently without assistive device.

## 2020-02-02 NOTE — PHYSICAL THERAPY INITIAL EVALUATION ADULT - PERTINENT HX OF CURRENT PROBLEM, REHAB EVAL
Pt is a 48 yo female  admitted for wound debridement, craniectomy and wound closure s/p R frontal craniectomy/wound wash out on 1/30/2020

## 2020-02-03 ENCOUNTER — TRANSCRIPTION ENCOUNTER (OUTPATIENT)
Age: 50
End: 2020-02-03

## 2020-02-03 VITALS
OXYGEN SATURATION: 99 % | TEMPERATURE: 97 F | SYSTOLIC BLOOD PRESSURE: 100 MMHG | RESPIRATION RATE: 18 BRPM | HEART RATE: 68 BPM | DIASTOLIC BLOOD PRESSURE: 58 MMHG

## 2020-02-03 PROCEDURE — 99222 1ST HOSP IP/OBS MODERATE 55: CPT

## 2020-02-03 PROCEDURE — 99232 SBSQ HOSP IP/OBS MODERATE 35: CPT

## 2020-02-03 PROCEDURE — 36569 INSJ PICC 5 YR+ W/O IMAGING: CPT

## 2020-02-03 PROCEDURE — 76937 US GUIDE VASCULAR ACCESS: CPT | Mod: 26,59

## 2020-02-03 RX ORDER — FILGRASTIM 480MCG/1.6
300 VIAL (ML) INJECTION
Qty: 2 | Refills: 0
Start: 2020-02-03 | End: 2020-02-04

## 2020-02-03 RX ORDER — VANCOMYCIN HCL 1 G
1.25 VIAL (EA) INTRAVENOUS
Qty: 112.5 | Refills: 0
Start: 2020-02-03 | End: 2020-03-18

## 2020-02-03 RX ORDER — SENNA PLUS 8.6 MG/1
2 TABLET ORAL
Qty: 10 | Refills: 0
Start: 2020-02-03 | End: 2020-02-07

## 2020-02-03 RX ORDER — FILGRASTIM 480MCG/1.6
300 VIAL (ML) INJECTION DAILY
Refills: 0 | Status: DISCONTINUED | OUTPATIENT
Start: 2020-02-03 | End: 2020-02-03

## 2020-02-03 RX ORDER — POTASSIUM CHLORIDE 20 MEQ
40 PACKET (EA) ORAL ONCE
Refills: 0 | Status: COMPLETED | OUTPATIENT
Start: 2020-02-03 | End: 2020-02-03

## 2020-02-03 RX ORDER — SODIUM CHLORIDE 9 MG/ML
10 INJECTION INTRAMUSCULAR; INTRAVENOUS; SUBCUTANEOUS
Refills: 0 | Status: DISCONTINUED | OUTPATIENT
Start: 2020-02-03 | End: 2020-02-03

## 2020-02-03 RX ORDER — SODIUM CHLORIDE 9 MG/ML
10 INJECTION INTRAMUSCULAR; INTRAVENOUS; SUBCUTANEOUS
Qty: 600 | Refills: 0
Start: 2020-02-03 | End: 2020-04-02

## 2020-02-03 RX ORDER — SODIUM CHLORIDE 9 MG/ML
500 INJECTION INTRAMUSCULAR; INTRAVENOUS; SUBCUTANEOUS ONCE
Refills: 0 | Status: COMPLETED | OUTPATIENT
Start: 2020-02-03 | End: 2020-02-03

## 2020-02-03 RX ORDER — OXYCODONE HYDROCHLORIDE 5 MG/1
1 TABLET ORAL
Qty: 30 | Refills: 0
Start: 2020-02-03 | End: 2020-02-07

## 2020-02-03 RX ORDER — CHLORHEXIDINE GLUCONATE 213 G/1000ML
1 SOLUTION TOPICAL
Refills: 0 | Status: DISCONTINUED | OUTPATIENT
Start: 2020-02-04 | End: 2020-02-03

## 2020-02-03 RX ORDER — LEVETIRACETAM 250 MG/1
1 TABLET, FILM COATED ORAL
Qty: 4 | Refills: 0
Start: 2020-02-03 | End: 2020-02-04

## 2020-02-03 RX ORDER — PEGFILGRASTIM-CBQV 6 MG/.6ML
6 INJECTION, SOLUTION SUBCUTANEOUS ONCE
Refills: 0 | Status: COMPLETED | OUTPATIENT
Start: 2020-02-03 | End: 2020-02-03

## 2020-02-03 RX ADMIN — SODIUM CHLORIDE 1000 MILLILITER(S): 9 INJECTION INTRAMUSCULAR; INTRAVENOUS; SUBCUTANEOUS at 03:32

## 2020-02-03 RX ADMIN — Medication 166.67 MILLIGRAM(S): at 15:51

## 2020-02-03 RX ADMIN — PEGFILGRASTIM-CBQV 6 MILLIGRAM(S): 6 INJECTION, SOLUTION SUBCUTANEOUS at 12:36

## 2020-02-03 RX ADMIN — Medication 5 MILLIGRAM(S): at 17:16

## 2020-02-03 RX ADMIN — Medication 5 MILLIGRAM(S): at 05:45

## 2020-02-03 RX ADMIN — Medication 40 MILLIEQUIVALENT(S): at 12:36

## 2020-02-03 RX ADMIN — Medication 166.67 MILLIGRAM(S): at 02:03

## 2020-02-03 RX ADMIN — LEVETIRACETAM 500 MILLIGRAM(S): 250 TABLET, FILM COATED ORAL at 17:15

## 2020-02-03 RX ADMIN — LEVETIRACETAM 500 MILLIGRAM(S): 250 TABLET, FILM COATED ORAL at 05:45

## 2020-02-03 NOTE — PROCEDURE NOTE - NSPOSTPRCRAD_GEN_A_CORE
line in appropriate postion/ultrasound/picc tip ends at svc-ra, confirmed with 3cg ultrasound, no cxr needed

## 2020-02-03 NOTE — PROCEDURE NOTE - NSPERFORMEDBY_GEN_A_CORE
Myself
General: chronically ill appearing man in no acute distress  Head: well healed scar on scalp  Eyes: PERRL, EOMI  Mouth: moist mucous membranes  Neck: supple neck, full ROM  CV: normal rate and rhythm, peripheral pulses 2+ bilaterally  Respiratory: clear to auscultation bilaterally  Abdomen: soft, nontender, nondistended  : no suprapubic tenderness, no CVAT  MSK: no Cspine tenderness to palpation, no midline tenderness to palpation, no tenderness to palpation of joints  Neuro: alert and oriented x3, CN III-XII intact, speech clear, strength 5/5 UE and 4+/5 RLE and 5/5 in LLE (no weakness on left side), sensation equal and intact bilaterally  Skin: bandaged wound on left inner thigh

## 2020-02-03 NOTE — DISCHARGE NOTE PROVIDER - HOSPITAL COURSE
HPI:    Pls find her latest chart note attached below:    49F with aplastic anemia, s/p right craniotomy Stereotactic Biopsy of the R    frontal non-enhancing tumor by Dr. Garcia 2/2019, s/p RTT/Chemo in [place]    5/2019, c/b wound infection s/p 10/28 for exploration and washout of craniotomy    wound with plastics closure, receiving Vancomycin BID through L PICC line. (30 Jan 2020 12:09)        Hospital Course:    3/30 POD# 0 S/P Right frontal craniectomy/wound washout    3/31 POD 1: DIAMOND overnight    2/1 POD 2: DIAMOND overnight    2/2 POD#3 Diamond overnight, Vanco until 3/11, PICC line pending, Dr Cherise bob consult following for Pancytopenia     2/3 POD#4 DIAMOND overnight. Neuro exam stable. Plan for PICC line today for vanco treatment until 3/11        Patient with uncomplicated hospital course. Started on course of antibiotics for wound infection, + OR culture: staph epidermidis. Patient with relatively uncomplicated hospital course. Patient evaluated with PT/OT who recommended home with no needs. Patient's pain is well controlled, tolerating PO diet, ready for discharge to home today. Patient understands and agrees to discharge plan.

## 2020-02-03 NOTE — PROGRESS NOTE ADULT - SUBJECTIVE AND OBJECTIVE BOX
HPI:  Pls find her latest chart note attached below:  49F with aplastic anemia, s/p right craniotomy Stereotactic Biopsy of the R  frontal non-enhancing tumor by Dr. Garcia 2/2019, s/p RTT/Chemo in [place]  5/2019, c/b wound infection s/p 10/28 for exploration and washout of craniotomy  wound with plastics closure, receiving Vancomycin BID through L PICC line. (30 Jan 2020 12:09)    Hospital Course:  3/30 POD# 0 S/P Right frontal craniectomy/wound washout  3/31 POD 1: DIAMOND overnight  2/1 POD 2: DIAMOND overnight  2/2 POD#3 Diamond overnight, Vanco until 3/11, PICC line pending, Dr Cherise bob consult following for Pancytopenia   2/3 POD#4 DIAMOND overnight. Neuro exam stable. Plan for PICC line today for vanco treatment until 3/11    Vital Signs Last 24 Hrs  T(C): 37.1 (02 Feb 2020 21:07), Max: 37.1 (02 Feb 2020 21:07)  T(F): 98.7 (02 Feb 2020 21:07), Max: 98.7 (02 Feb 2020 21:07)  HR: 64 (02 Feb 2020 20:20) (52 - 67)  BP: 84/53 (02 Feb 2020 20:20) (84/53 - 114/56)  BP(mean): 62 (02 Feb 2020 20:20) (62 - 80)  RR: 17 (02 Feb 2020 20:20) (12 - 18)  SpO2: 98% (02 Feb 2020 20:20) (96% - 100%)    I&O's Summary    01 Feb 2020 07:01  -  02 Feb 2020 07:00  --------------------------------------------------------  IN: 910 mL / OUT: 20 mL / NET: 890 mL      PHYSICAL EXAM:  Neurological: AAOx3, FC, speech coherent. CNII-XII: EOM intact, PERRL, face symmetric, MAEx4 5/5 UE and LE b/l.  SILT throughout.  No pronator drift, no dysmetria.   Right frontal cranial incision with dressing intact.   Cor RRR S1, S2+  Pulm CTA B/L  Abd Soft, NT/ND. +BS  Extrem: calves soft, nontender. no edema    TUBES/LINES:  [] Elliott  [] Lumbar Drain  [] Wound Drains  [] Others    DIET:  [] NPO  [x] Mechanical  [] Tube feeds    LABS:                        8.5    2.33  )-----------( 92       ( 02 Feb 2020 06:35 )             25.8     02-02    140  |  106  |  7   ----------------------------<  101<H>  3.4<L>   |  26  |  0.64    Ca    8.9      02 Feb 2020 06:35  Phos  3.0     02-01  Mg     1.9     02-01              CAPILLARY BLOOD GLUCOSE          Drug Levels: [] N/A  Vancomycin Level, Trough: 16.8 ug/mL (02-01 @ 15:22)  Vancomycin Level, Trough: 16.2 ug/mL (02-01 @ 01:38)  Vancomycin Level, Trough: 25.2 ug/mL (01-31 @ 06:42)    CSF Analysis: [] N/A      Allergies    penicillins (Unknown)  sulfa drugs (Unknown)    Intolerances      MEDICATIONS:  Antibiotics:  vancomycin  IVPB 1250 milliGRAM(s) IV Intermittent every 12 hours    Neuro:  acetaminophen   Tablet .. 650 milliGRAM(s) Oral every 6 hours PRN  HYDROmorphone  Injectable 0.5 milliGRAM(s) IV Push every 4 hours PRN  levETIRAcetam 500 milliGRAM(s) Oral two times a day  ondansetron Injectable 4 milliGRAM(s) IV Push every 6 hours PRN  oxyCODONE    IR 5 milliGRAM(s) Oral every 4 hours PRN  oxyCODONE    IR 10 milliGRAM(s) Oral every 4 hours PRN    Anticoagulation:    OTHER:  artificial  tears Solution 1 Drop(s) Both EYES four times a day PRN  bisacodyl 5 milliGRAM(s) Oral every 12 hours  polyethylene glycol 3350 17 Gram(s) Oral daily PRN  senna 2 Tablet(s) Oral at bedtime    IVF:  sodium chloride 0.9% Bolus 500 milliLiter(s) IV Bolus once    CULTURES:  Culture Results:   No growth to date (01-30 @ 15:16)  Culture Results:   Moderate Staphylococcus epidermidis (01-30 @ 15:07)    RADIOLOGY & ADDITIONAL TESTS:      ASSESSMENT:  49yFemale with 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 Byron Fitzpatrick and Plastics Dr. Vignesh Anaya.    G06.0  No pertinent family history in first degree relatives  Handoff  MEWS Score  Intracranial abscess and granuloma  Brain mass  Fibroids  No pertinent past medical history  Pancytopenia  Wound dehiscence  History of surgery  H/O hysterectomy with unilateral oophorectomy  No significant past surgical history  No significant past surgical history      Plan:   - Neuro checks   - Vitals checks   - Imaging only if change in exam   - ID consult: vanc until 3/11, OR Cx Staph Epid  -  PICC line placement pending today   - Heme consult Dr. Luong for hx pancytopenia, recs are appreciated: CBC w/ Diff in AM    - Keppra prophylaxis for 5 days until 2/5   - Pain control PRN oxycodone IR  -  DVT prophylaxis: SCDs    All above d/w Dr Fitzpatrick.

## 2020-02-03 NOTE — DISCHARGE NOTE PROVIDER - NSDCCPCAREPLAN_GEN_ALL_CORE_FT
PRINCIPAL DISCHARGE DIAGNOSIS  Diagnosis: Wound dehiscence  Assessment and Plan of Treatment: s/p wound washout

## 2020-02-03 NOTE — DISCHARGE NOTE NURSING/CASE MANAGEMENT/SOCIAL WORK - PATIENT PORTAL LINK FT
You can access the FollowMyHealth Patient Portal offered by [facility] by registering at the following website: [email or web address]. By joining [facility]’s FollowMyHealth portal, you will also be able to view your health information using other applications (apps) compatible with our system.

## 2020-02-03 NOTE — CONSULT NOTE ADULT - SUBJECTIVE AND OBJECTIVE BOX
Vascular Access Service Consult Note    49yFemaleHEALTH ISSUES - PROBLEM Dx:  Pancytopenia: Pancytopenia  Wound dehiscence             Diagnosis: wound infection    Indications for Vascular Access (Check all that apply)  [ x ]  Antibiotic Therapy       Antibiotic Prescribed:  vanco x6 weeks            [  ]  IV Hydration  [  ]  Total Parenteral Nutrition  [  ]  Chemotherapy  [  ]  Difficult Venous Access  [  ]  CVP monitoring  [  ]  Medications with high potential for tissue necrosis on extravasation  [  ]  Other    Screening (Check all that apply)  Previous Radiation to chest  [  ] Yes      [x  ]  No  Breast Cancer                          [  ] Left     [  ]  Right    [ x ]  No  Lymph Node Dissection         [  ] Left     [  ]  Right    [ x ]  No  Pacemaker or ICD                   [  ] Left     [  ]  Right    [ x ]  No  Upper Extremity DVT             [  ] Left     [  ]  Right    [  x]  No  Chronic Kidney Disease         [  ]  Yes     [ x ]  No  Hemodialysis                           [  ]  Yes     [ x ]  No  AV Fistula/ Graft                     [  ]  Left    [  ]  Right    [x  ]  No  Temp>101F in past 24 H       [  ]  Yes     [  x]  No  H/O PICC/Midline                   [  x]  Yes     [  ]  No    Lab data:                        8.5    2.33  )-----------( 92       ( 02 Feb 2020 06:35 )             25.8     02-02    140  |  106  |  7   ----------------------------<  101<H>  3.4<L>   |  26  |  0.64    Ca    8.9      02 Feb 2020 06:35      no blood cultures          I have reviewed the chart, interviewed and examined the patient and determined that this patient:  [x  ] Is a candidate for a PICC line  [  ] Is a candidate for a Midline  [  ] Is not a candidate for vascular access device (reason)    Lumens:    [x  ] Single  [  ] Double

## 2020-02-03 NOTE — PROCEDURE NOTE - NSPROCDETAILS_GEN_ALL_CORE
ultrasound assessment/supine position/ultrasound guidance/location identified, draped/prepped, sterile technique used/sterile technique, catheter placed/sterile dressing applied

## 2020-02-03 NOTE — DISCHARGE NOTE PROVIDER - CARE PROVIDER_API CALL
Byron Fitzpatrick)  Neurosurgery  [address] [place] [address]  Phone: [phone]  Fax: [phone]  Follow Up Time:     Kellee Luong)  Internal Medicine  [address] [place] [address]  Phone: [phone]  Fax: [phone]  Follow Up Time:     Mj Calixto)  Internal Medicine  [address], [address] [place] [address]  Phone: [phone]  Fax: [phone]  Follow Up Time:

## 2020-02-03 NOTE — DISCHARGE NOTE PROVIDER - CARE PROVIDERS DIRECT ADDRESSES
[email or web address]

## 2020-02-03 NOTE — DISCHARGE NOTE PROVIDER - NSDCACTIVITY_GEN_ALL_CORE
No heavy lifting/straining/Stairs allowed/Walking - Indoors allowed/Bathing allowed/Walking - Outdoors allowed

## 2020-02-03 NOTE — CONSULT NOTE ADULT - SUBJECTIVE AND OBJECTIVE BOX
Patient is a 49y old  Female who presents with a chief complaint of wound infection (01 Feb 2020 11:54)     no new headaches dizziness or weakness - no new tremors    HPI:  Pls find her latest chart note attached below:  49F with aplastic anemia, s/p right craniotomy Stereotactic Biopsy of the R  frontal non-enhancing tumor by Dr. Garcia 2/2019, s/p RTT/Chemo in [place]  5/2019, c/b wound infection s/p 10/28 for exploration and washout of craniotomy  wound with plastics closure, receiving Vancomycin BID through L PICC line. (30 Jan 2020 12:09)      Allergies  penicillins (Unknown)  sulfa drugs (Unknown)      Health Issues  G06.0  No pertinent family history in first degree relatives  Handoff  MEWS Score  Intracranial abscess and granuloma  Brain mass  Fibroids  No pertinent past medical history  Pancytopenia  Wound dehiscence  History of surgery  H/O hysterectomy with unilateral oophorectomy  No significant past surgical history  No significant past surgical history        FAMILY HISTORY:      MEDICATIONS  (STANDING):  bisacodyl 5 milliGRAM(s) Oral every 12 hours  filgrastim-sndz Injectable 300 MICROGram(s) SubCutaneous daily  levETIRAcetam 500 milliGRAM(s) Oral two times a day  potassium chloride    Tablet ER 40 milliEquivalent(s) Oral once  senna 2 Tablet(s) Oral at bedtime  vancomycin  IVPB 1250 milliGRAM(s) IV Intermittent every 12 hours    MEDICATIONS  (PRN):  acetaminophen   Tablet .. 650 milliGRAM(s) Oral every 6 hours PRN Temp greater or equal to 38C (100.4F), Mild Pain (1 - 3)  artificial  tears Solution 1 Drop(s) Both EYES four times a day PRN Dry Eyes  HYDROmorphone  Injectable 0.5 milliGRAM(s) IV Push every 4 hours PRN break through pain  ondansetron Injectable 4 milliGRAM(s) IV Push every 6 hours PRN Nausea and/or Vomiting  oxyCODONE    IR 5 milliGRAM(s) Oral every 4 hours PRN Moderate Pain (4 - 6)  oxyCODONE    IR 10 milliGRAM(s) Oral every 4 hours PRN Severe Pain (7 - 10)  polyethylene glycol 3350 17 Gram(s) Oral daily PRN Constipation      PAST MEDICAL & SURGICAL HISTORY:  Intracranial abscess and granuloma  Brain mass  Fibroids  History of surgery: brain biopsy  H/O hysterectomy with unilateral oophorectomy      Labs                          8.5    2.33  )-----------( 92       ( 02 Feb 2020 06:35 )             25.8     02-02    140  |  106  |  7   ----------------------------<  101<H>  3.4<L>   |  26  |  0.64    Ca    8.9      02 Feb 2020 06:35        Radiology:    Physical Exam    MENTAL STATUS  -Level of Consciousness- awake    Orientation- person, place time  Language- aphasia/ dysarthria- can name and follow commands   Memory- recent and remote grossly intact      Cranial Nerve 1- 12  Pupils- equal and reactive  Eye movements- full  Facial - no asymmetry   Lower CN-nl    Gait and Station- n/a    MOTOR  Upper-no drift  Lower- no foot drop    Reflexes- decreased    Sensation n/a    Cerebellar- no tremors    vascular -no bruits    Assessment- GBM    Plan wound infection as per NS

## 2020-02-03 NOTE — DISCHARGE NOTE PROVIDER - NSDCFUADDINST_GEN_ALL_CORE_FT
1. You must wash your hair starting 24 hours after being home. Use your normal  shampoo. During the shampoo, massage the staples to remove any dried blood  or crusting. This keeps your wound clean and helps healing. You should shampoo everyday.  2. Mild swelling around the incision is common. Keep incision open to air and dry.  3. Call our office at [phone] to set up the appointment with an NP for wound check  once you get home.  4. Inform the doctor immediately if you have a fever (above 101), chills, night  sweats, wound drainage, increasing wound redness or pain, nausea, vomiting, or  worsening headache.  B. ACTIVITY LEVEL  1. Fatigue is common following brain surgery, rest if you are tired.  2. You should get up and walk around every hour during the daytime.  3. No bending, lifting or twisting for the first 3 weeks, but walking is  recommended.  4. Drink plenty of water, stay out of the sun.  You may be given a narcotic pain reliever such as Percocet  (oxycodone-acetaminophen). This is for short term use. Avoid use of alcohol when taking these meds.    You will need to take IV antibiotics (Vancomycin 1.25g BID until 3/11  Follow up with Dr. Fitzpatrick in the office in 1-2 weeks    Follow up with Dr. Luong for hematology in the office in 1 week     Continue Keppra for 2 more days and then stop taking it on 2/5/20    Follow up with Dr. Calixto regarding your antibiotics. Please fax over your lab results to his office weekly [phone]

## 2020-02-03 NOTE — PROGRESS NOTE ADULT - ASSESSMENT
48y/F with  1.  bilateral frontal masses, brain compression, cerebral edema s/p open brain biopsy (02/01/2019, Dr. Garcia) s/p debridement, bone flap removal (01/30/2020 Dr. Fitzpatrick, Dr. Anaya)  2.  fibroids  3.  hepatic steatosis    PLAN:   NEURO: neurochecks q1h, PRN pain meds with tylenol, opiates   seizure prophylaxis: start levetiracetam 500mg PO BID   REHAB:  physical therapy evaluation and management    EARLY MOB:  HOB up    PULM:  PRN O2 support to keep sats >/=92%, incentive spirometry  CARDIO:  SBP goal 100-150mm Hg  ENDO:  Blood sugar goals 140-180 mg/dL, insulin sliding scale  GI:  bowel regimen  DIET: advance diet as tolerated  RENAL:  IVF until eating well  HEM/ONC: pancytopenia, heme consulted  VTE Prophylaxis: SCDs only, no DVT chemoprophylaxis for now as patient is high risk for bleed (fresh post-op), baseline LE Doppler for DVT suspected on admission (malignancy)  ID: afebrile, no leukocytosis; cefepime vancomycin - vanc trough prior to 4th dose; f/u microbiologic work-up; ID on board, duration as per ID.  Social: will update family    ATTENDING ATTESTATION:  I was physically present for the key portions of the evaluation and management (E/M) service provided.  I agree with the above history, physical and plan, which I have reviewed and edited where appropriate.    Patient at high risk for neurological deterioration or death due to:  ICU delirium, aspiration PNA, DVT / PE.  Critical care time:  I have personally provided 60 minutes of critical care time, excluding time spent on separate procedures.      Plan discussed with RN, house staff.
48y/F with  1.  bilateral frontal masses, brain compression, cerebral edema s/p open brain biopsy (02/01/2019, Dr. Garcia) s/p debridement, bone flap removal (01/30/2020 Dr. Fitzpatrick, Dr. Anaya)  2.  fibroids  3.  hepatic steatosis    PLAN:   NEURO: neurochecks q4h, PRN pain meds with tylenol, opiates   seizure prophylaxis: cont levetiracetam 500mg PO BID x 5 days then d/c   REHAB:  physical therapy evaluation and management    EARLY MOB:  HOB up OOB to chair, ambulate    PULM:  room air, incentive spirometry  CARDIO:  SBP goal 100-150mm Hg  ENDO:  Blood sugar goals 140-180 mg/dL, insulin sliding scale  GI:  bowel regimen  DIET: regular diet  RENAL:  d/c once eating well  HEM/ONC: pancytopenia, heme consulted - possible neulasta   VTE Prophylaxis: SCDs, will start DVT chemoprophylaxis tomorrow; f/u baseline LE Doppler   ID: afebrile, no leukocytosis; cefepime vancomycin - f/u vanc trough prior to 4th dose; f/u microbiologic work-up; ID on board, duration as per ID.  Social: will update family    ATTENDING ATTESTATION:  I was physically present for the key portions of the evaluation and management (E/M) service provided.  I agree with the above history, physical and plan, which I have reviewed and edited where appropriate.    Patient at high risk for neurological deterioration or death due to:  ICU delirium, aspiration PNA, DVT / PE.  Critical care time:  I have personally provided 60 minutes of critical care time, excluding time spent on separate procedures.      Plan discussed with RN, house staff.
49F with aplastic anemia, s/p right craniotomy stereotactic Biopsy of the R frontal non-enhancing tumor 2/2019, s/p RTT/Chemo in [place]  5/2019, c/b wound infection s/p 10/28 for exploration and washout of craniotomy wound with plastics closure, s/p 6 weeks Vancomycin via L PICC line (end date 12/10/20) for OR cx growing S. capitis and S. epidermidis . Surgery scheduled after surgical site purulence was noted at outpatient neurosurgery follow up. OR cx 1/30 again with CoNS.  - d/c cefepime  - continue vancomycin 1.25g IV q12h (trough therapeutic); anticipate 6 week course through 3/11  - PICC  - weekly CBC, CMP, ESR, CRP, vancomycin trough should be faxed to [phone]    Will arrange post hospital f/u with Dr. Calixto    Please reconsult with ?  ID Team 1
A/P: Pt doing well POD#2 s/p scalp closure  1. Drain may be ready for removal, will defer to neurosurgery  2. No shower/bath
A/P: Pt doing well POD#3 s/p scalp closure  1. Drain removed  2. No shower/bath x 3 more days  3. Change dressings as needed
A/P: Pt doing well POD#4 s/p scalp closure  1. Awaiting discharge today  2. No shower/bath until Wednesday  3. Change dressings as needed  4. f/u in office Tuesday
49F with aplastic anemia, s/p right craniotomy stereotactic Biopsy of the R frontal non-enhancing tumor 2/2019, s/p RTT/Chemo in [place]  5/2019, c/b wound infection s/p 10/28 for exploration and washout of craniotomy wound with plastics closure, s/p 6 weeks Vancomycin via L PICC line (end date 12/10/20) for OR cx growing S. capitis and S. epidermidis . Surgery scheduled after surgical site purulence was noted at outpatient neurosurgery follow up.    Recommend:  - 1/30 OR cultures growing S. epidermidis  - c/w cefepime 2gQ8, Vanc [id number]  - please obtain PCN skin allergy testing   - Obtain vanc trough prior to administration of 4th vancomycin dose      Plan discussed with ID attending.

## 2020-02-03 NOTE — PROGRESS NOTE ADULT - SUBJECTIVE AND OBJECTIVE BOX
HEALTH ISSUES - PROBLEM Dx:  Pancytopenia: Pancytopenia  Wound dehiscence          CHEMOTHERAPY REGIMEN:        Day:                          Diet:  Protocol:                                    IVF:      MEDICATIONS  (STANDING):  bisacodyl 5 milliGRAM(s) Oral every 12 hours  levETIRAcetam 500 milliGRAM(s) Oral two times a day  senna 2 Tablet(s) Oral at bedtime  vancomycin  IVPB 1250 milliGRAM(s) IV Intermittent every 12 hours    MEDICATIONS  (PRN):  acetaminophen   Tablet .. 650 milliGRAM(s) Oral every 6 hours PRN Temp greater or equal to 38C (100.4F), Mild Pain (1 - 3)  artificial  tears Solution 1 Drop(s) Both EYES four times a day PRN Dry Eyes  HYDROmorphone  Injectable 0.5 milliGRAM(s) IV Push every 4 hours PRN break through pain  ondansetron Injectable 4 milliGRAM(s) IV Push every 6 hours PRN Nausea and/or Vomiting  oxyCODONE    IR 5 milliGRAM(s) Oral every 4 hours PRN Moderate Pain (4 - 6)  oxyCODONE    IR 10 milliGRAM(s) Oral every 4 hours PRN Severe Pain (7 - 10)  polyethylene glycol 3350 17 Gram(s) Oral daily PRN Constipation  sodium chloride 0.9% lock flush 10 milliLiter(s) IV Push every 1 hour PRN Pre/post blood products, medications, blood draw, and to maintain line patency      Allergies    penicillins (Unknown)  sulfa drugs (Unknown)    Intolerances        DVT Prophylaxis: [ ] YES [ ] NO      Antibiotics: [ ] YES [ ] NO    Pain Scale (1-10):       Location:    Vital Signs Last 24 Hrs  T(C): 36.3 (03 Feb 2020 13:35), Max: 37.1 (02 Feb 2020 21:07)  T(F): 97.4 (03 Feb 2020 13:35), Max: 98.7 (02 Feb 2020 21:07)  HR: 70 (03 Feb 2020 12:39) (54 - 70)  BP: 102/58 (03 Feb 2020 12:39) (80/53 - 102/58)  BP(mean): 75 (03 Feb 2020 12:39) (62 - 75)  RR: 16 (03 Feb 2020 12:39) (16 - 18)  SpO2: 100% (03 Feb 2020 12:39) (97% - 100%)    Drug Dosing Weight  Height (cm): 175.26 (30 Jan 2020 08:49)  Weight (kg): 77.2 (30 Jan 2020 08:49)  BMI (kg/m2): 25.1 (30 Jan 2020 08:49)  BSA (m2): 1.93 (30 Jan 2020 08:49)     Physical Exam:  · Constitutional	detailed exam  · Constitutional Details	well-developed; well-groomed  · Eyes	EOMI; PERRL; no drainage or redness  · ENMT Comments	dry mucous membranes  · Respiratory	detailed exam  · Respiratory Comments	normal breath sounds at the lung bases bilaterally  · Cardiovascular	Regular rate & rhythm, normal S1, S2; no murmurs, gallops or rubs; no S3, S4  · Abd-Soft non tender  ·Ext-no edema, clubbing or cyanosis    URINARY CATHETER: [ ] YES [ ] NO     LABS:  CBC Full  -  ( 02 Feb 2020 06:35 )  WBC Count : 2.33 K/uL  RBC Count : 2.52 M/uL  Hemoglobin : 8.5 g/dL  Hematocrit : 25.8 %  Platelet Count - Automated : 92 K/uL  Mean Cell Volume : 102.4 fl  Mean Cell Hemoglobin : 33.7 pg  Mean Cell Hemoglobin Concentration : 32.9 gm/dL  Auto Neutrophil # : 1.29 K/uL  Auto Lymphocyte # : 0.87 K/uL  Auto Monocyte # : 0.08 K/uL  Auto Eosinophil # : 0.00 K/uL  Auto Basophil # : 0.04 K/uL  Auto Neutrophil % : 55.3 %  Auto Lymphocyte % : 37.5 %  Auto Monocyte % : 3.6 %  Auto Eosinophil % : 0.0 %  Auto Basophil % : 1.8 %    02-02    140  |  106  |  7   ----------------------------<  101<H>  3.4<L>   |  26  |  0.64    Ca    8.9      02 Feb 2020 06:35            CULTURES:    RADIOLOGY & ADDITIONAL STUDIES:

## 2020-02-03 NOTE — DISCHARGE NOTE PROVIDER - PROVIDER TOKENS
PROVIDER:[TOKEN:[[id number]]],PROVIDER:[TOKEN:[[id number]]],PROVIDER:[TOKEN:[[id number]]]

## 2020-02-03 NOTE — DISCHARGE NOTE PROVIDER - NSDCMRMEDTOKEN_GEN_ALL_CORE_FT
CBC, ESR, CRP, CMP, CPK : CBC, ESR, CRP, CMP, CPK weekly while on antibiotics   Normal Saline Flush 0.9% injectable solution: 10 milliliter(s) injectable 2 times a day   Vancomycin 1250mg BID: Vancomycin 1250mg BID until 3/11/2020 bisacodyl 5 mg oral delayed release tablet: 1 tab(s) orally every 12 hours  CBC, ESR, CRP, CMP, CPK : CBC, ESR, CRP, CMP, CPK weekly while on antibiotics   levETIRAcetam 500 mg oral tablet: 1 tab(s) orally 2 times a day  Normal Saline Flush 0.9% injectable solution: 10 milliliter(s) injectable 2 times a day   oxyCODONE 5 mg oral tablet: 1 tab(s) orally every 4 hours, As Needed -Moderate Pain (4 - 6) - for moderate pain MDD:6 tabs  senna oral tablet: 2 tab(s) orally once a day (at bedtime)  vancomycin 1.25 g intravenous injection: 1.25 gram(s) intravenous every 12 hours  Vancomycin 1250mg BID: Vancomycin 1250mg BID until 3/11/2020

## 2020-02-03 NOTE — PROGRESS NOTE ADULT - SUBJECTIVE AND OBJECTIVE BOX
Pt seen and examined.   No acute events.    Exam:  AVSS. NAD.   Incision c/d/i. No fluctuance or drainage.

## 2020-02-03 NOTE — DISCHARGE NOTE NURSING/CASE MANAGEMENT/SOCIAL WORK - NSSCNAMETXT_GEN_ALL_CORE
[facility] (Formerly [facility])

## 2020-02-05 LAB — SURGICAL PATHOLOGY STUDY: SIGNIFICANT CHANGE UP

## 2020-02-06 DIAGNOSIS — D61.9 APLASTIC ANEMIA, UNSPECIFIED: ICD-10-CM

## 2020-02-06 DIAGNOSIS — Z88.0 ALLERGY STATUS TO PENICILLIN: ICD-10-CM

## 2020-02-06 DIAGNOSIS — T81.43XA INFECTION FOLLOWING A PROCEDURE, ORGAN AND SPACE SURGICAL SITE, INITIAL ENCOUNTER: ICD-10-CM

## 2020-02-06 DIAGNOSIS — T81.31XA DISRUPTION OF EXTERNAL OPERATION (SURGICAL) WOUND, NOT ELSEWHERE CLASSIFIED, INITIAL ENCOUNTER: ICD-10-CM
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
## 2020-02-06 DIAGNOSIS — G06.0 INTRACRANIAL ABSCESS AND GRANULOMA: ICD-10-CM

## 2020-02-06 DIAGNOSIS — I96 GANGRENE, NOT ELSEWHERE CLASSIFIED: ICD-10-CM

## 2020-02-06 DIAGNOSIS — Z90.710 ACQUIRED ABSENCE OF BOTH CERVIX AND UTERUS: ICD-10-CM

## 2020-02-06 DIAGNOSIS — Z92.21 PERSONAL HISTORY OF ANTINEOPLASTIC CHEMOTHERAPY: ICD-10-CM

## 2020-02-06 DIAGNOSIS — B95.7 OTHER STAPHYLOCOCCUS AS THE CAUSE OF DISEASES CLASSIFIED ELSEWHERE: ICD-10-CM

## 2020-02-06 DIAGNOSIS — K76.0 FATTY (CHANGE OF) LIVER, NOT ELSEWHERE CLASSIFIED: ICD-10-CM

## 2020-02-06 DIAGNOSIS — D61.818 OTHER PANCYTOPENIA: ICD-10-CM

## 2020-02-06 DIAGNOSIS — Z90.721 ACQUIRED ABSENCE OF OVARIES, UNILATERAL: ICD-10-CM

## 2020-02-06 DIAGNOSIS — Z88.2 ALLERGY STATUS TO SULFONAMIDES: ICD-10-CM

## 2020-02-06 DIAGNOSIS — Z92.3 PERSONAL HISTORY OF IRRADIATION: ICD-10-CM
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
## 2020-02-06 PROCEDURE — 88311 DECALCIFY TISSUE: CPT

## 2020-02-06 PROCEDURE — 87075 CULTR BACTERIA EXCEPT BLOOD: CPT

## 2020-02-06 PROCEDURE — 36573 INSJ PICC RS&I 5 YR+: CPT

## 2020-02-06 PROCEDURE — 80048 BASIC METABOLIC PNL TOTAL CA: CPT

## 2020-02-06 PROCEDURE — 87070 CULTURE OTHR SPECIMN AEROBIC: CPT

## 2020-02-06 PROCEDURE — 82962 GLUCOSE BLOOD TEST: CPT

## 2020-02-06 PROCEDURE — 36415 COLL VENOUS BLD VENIPUNCTURE: CPT

## 2020-02-06 PROCEDURE — 80202 ASSAY OF VANCOMYCIN: CPT

## 2020-02-06 PROCEDURE — 93970 EXTREMITY STUDY: CPT

## 2020-02-06 PROCEDURE — 36430 TRANSFUSION BLD/BLD COMPNT: CPT

## 2020-02-06 PROCEDURE — 85610 PROTHROMBIN TIME: CPT

## 2020-02-06 PROCEDURE — P9035: CPT

## 2020-02-06 PROCEDURE — 85025 COMPLETE CBC W/AUTO DIFF WBC: CPT

## 2020-02-06 PROCEDURE — 80053 COMPREHEN METABOLIC PANEL: CPT

## 2020-02-06 PROCEDURE — 85730 THROMBOPLASTIN TIME PARTIAL: CPT

## 2020-02-06 PROCEDURE — 88304 TISSUE EXAM BY PATHOLOGIST: CPT

## 2020-02-06 PROCEDURE — 84100 ASSAY OF PHOSPHORUS: CPT

## 2020-02-06 PROCEDURE — 71045 X-RAY EXAM CHEST 1 VIEW: CPT

## 2020-02-06 PROCEDURE — 86901 BLOOD TYPING SEROLOGIC RH(D): CPT

## 2020-02-06 PROCEDURE — 87186 SC STD MICRODIL/AGAR DIL: CPT

## 2020-02-06 PROCEDURE — 86850 RBC ANTIBODY SCREEN: CPT

## 2020-02-06 PROCEDURE — 83735 ASSAY OF MAGNESIUM: CPT

## 2020-02-06 PROCEDURE — 85027 COMPLETE CBC AUTOMATED: CPT

## 2020-02-13 ENCOUNTER — APPOINTMENT (OUTPATIENT)
Dept: INFECTIOUS DISEASE | Facility: CLINIC | Age: 50
End: 2020-02-13
Payer: COMMERCIAL

## 2020-02-13 VITALS
DIASTOLIC BLOOD PRESSURE: 71 MMHG | BODY MASS INDEX: 25.33 KG/M2 | OXYGEN SATURATION: 100 % | HEART RATE: 78 BPM | SYSTOLIC BLOOD PRESSURE: 106 MMHG | TEMPERATURE: 97.5 F | HEIGHT: 69 IN | WEIGHT: 171 LBS

## 2020-02-13 PROCEDURE — 99214 OFFICE O/P EST MOD 30 MIN: CPT

## 2020-02-13 NOTE — PHYSICAL EXAM
[General Appearance - Alert] : alert [Outer Ear] : the ears and nose were normal in appearance [Neck Appearance] : the appearance of the neck was normal [Sclera] : the sclera and conjunctiva were normal [Heart Rate And Rhythm] : heart rate was normal and rhythm regular [Edema] : there was no peripheral edema [Abdomen Soft] : soft [Bowel Sounds] : normal bowel sounds [No Palpable Adenopathy] : no palpable adenopathy [External Female Genitalia] : normal external genitalia [] : no rash [Musculoskeletal - Swelling] : no joint swelling [Motor Exam] : the motor exam was normal [No Focal Deficits] : no focal deficits [Oriented To Time, Place, And Person] : oriented to person, place, and time [FreeTextEntry1] : scalp wound is clean without redness or erythema

## 2020-02-13 NOTE — HISTORY OF PRESENT ILLNESS
[FreeTextEntry1] : 49 year old female here for follow up.  She is s/p right craniotomy with stereotactic 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 Garcia 2/2019, s/p RTT/Chemo in [place] 5/2019.  She then presented to [facility] ED on 10/23 with complaints of wound drainage since 10/19/2019. On 10/28, she underwent exploration and washout of craniotomy wound with plastics closure.  OR cultures grew Staph Capitis and Epidermitis. She was started on vancomycin x 6 weeks.  She completed her antibiotics on 12/10. \par \par During outpatient follow in January it was noted she had some drainage from incision site.  On 1/30 she went to the OR for wound debridement, bone flap removal and wound closure.  During this time subgaleal pus was noted.  Unhealthy granulation tissue was noted and removed.  Cultures from OR again showed S. epi.  She was started on IV vancomycin which she continues on now. \par \par She reports occasional headache.  No active drainage from the site.  No fevers.

## 2020-02-14 ENCOUNTER — APPOINTMENT (OUTPATIENT)
Dept: NEUROSURGERY | Facility: CLINIC | Age: 50
End: 2020-02-14
Payer: COMMERCIAL

## 2020-02-14 VITALS
RESPIRATION RATE: 18 BRPM | BODY MASS INDEX: 25.33 KG/M2 | TEMPERATURE: 97.4 F | OXYGEN SATURATION: 98 % | DIASTOLIC BLOOD PRESSURE: 78 MMHG | SYSTOLIC BLOOD PRESSURE: 111 MMHG | HEART RATE: 83 BPM | HEIGHT: 69 IN | WEIGHT: 171 LBS

## 2020-02-14 DIAGNOSIS — Z98.890 OTHER SPECIFIED POSTPROCEDURAL STATES: ICD-10-CM

## 2020-02-14 DIAGNOSIS — G93.89 OTHER SPECIFIED DISORDERS OF BRAIN: ICD-10-CM

## 2020-02-14 DIAGNOSIS — T81.49XA INFECTION FOLLOWING A PROCEDURE, OTHER SURGICAL SITE, INITIAL ENCOUNTER: ICD-10-CM

## 2020-02-14 PROCEDURE — 99024 POSTOP FOLLOW-UP VISIT: CPT

## 2020-02-14 NOTE — ASSESSMENT
[FreeTextEntry1] : Shower daily. Wash hair with mild shampoo, i.e. Cordell and Cordell.  Pat incision line dry with dry separate towel.\par Report all s/s infection including drainage, redness, warmth, fever, weakness, chills.\par Follow-up with Dr. Fitzpatrick in one month.\par No tub baths/pools for 8 weeks.\par Keep incision covered and protected from sun for 3-6 months following surgery.\par Follow up with ID, Dr. Calixto\par Follow up with hematology, Dr. Luong\par Follow up with plastic surgery for wound management, Dr. Vignesh Anaya\par Follow up with oncologist at [facility] as scheduled\par \par Discussed second surgery for RIGHT frontal cranioplasty in 6 months to one year\par \par Plan:\par \par 1. Follow up with Dr. Fitzpatrick in one month\par 2. Plan for cranioplasty in 6 months to one year\par 3. Follow up with specialists, as above\par \par Patient and patient's sister verbalize agreement and understanding with plan.\par

## 2020-02-14 NOTE — REASON FOR VISIT
[Family Member] : family member [de-identified] : RIGHT frontal craniectomy for removal of intracranial abscess [de-identified] : 1/31/2020 [de-identified] : \par Reports she is doing well.\par Denies any signs of postop wound infection which could include but is not limited to redness/swelling/purulent drainage\par Denies CP/SOB/unilateral leg edema. Denies headaches, nausea, vomiting, dizziness, weakness. \par She is slowly introducing preop activities and has returned to work\par \par She saw Dr. Calixto yesterday. She is currently on IV antibiotics with PICC line in place.\par Cultures from OR showed S. epi, and she is currently on vancomycin IV.\par She has follow up with plastic surgery, Dr. Vignesh Anaya, for wound check and suture removal.\par

## 2020-02-14 NOTE — HISTORY OF PRESENT ILLNESS
[FreeTextEntry1] : 49 year old woman who presented with severe headaches in January 2018 and workup demonstrated brain mass.\par \par She underwent biopsy of brain mass on 2/1/19 and pathology indicated high grade glioma.\par \par She was undergoing chemotherapy  closer to her family in [place]. Her last treatment was in March 2019. She returned to [place] after this treatment and has since been followed at [facility] with Dr. Yuliet Patel.\par \par She presented to [facility] ED on 10/23/19 with purulent drainage and dehiscence from previous right frontal cranial incision site. 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 \par \par She was seen in the office on 1/17/2020 due to nonhealing surgical incision and wound drainage. It was recommended she have craniectomy and wound washout with Dr. Héctor Medellin. She underwent wound washout and RIGHT frontal craniectomy on 1/31/2020.

## 2020-02-14 NOTE — PHYSICAL EXAM
[General Appearance - Alert] : alert [General Appearance - In No Acute Distress] : in no acute distress [Clean] : clean [Dry] : dry [Intact] : intact [Normal Skin] : normal [Sutures Intact] : closed with intact sutures [No Drainage] : without drainage [Cranial Nerves Optic (II)] : visual acuity intact bilaterally,  pupils equal round and reactive to light [Oriented To Time, Place, And Person] : oriented to person, place, and time [Cranial Nerves Facial (VII)] : face symmetrical [Cranial Nerves Trigeminal (V)] : facial sensation intact symmetrically [Cranial Nerves Oculomotor (III)] : extraocular motion intact [Cranial Nerves Vestibulocochlear (VIII)] : hearing was intact bilaterally [Cranial Nerves Glossopharyngeal (IX)] : tongue and palate midline [Motor Tone] : muscle tone was normal in all four extremities [Motor Strength] : muscle strength was normal in all four extremities [Cranial Nerves Hypoglossal (XII)] : there was no tongue deviation with protrusion [Cranial Nerves Accessory (XI - Cranial And Spinal)] : head turning and shoulder shrug symmetric [Sclera] : the sclera and conjunctiva were normal [Outer Ear] : the ears and nose were normal in appearance [] : no respiratory distress [Neck Appearance] : the appearance of the neck was normal [Abnormal Walk] : normal gait [Skin Color & Pigmentation] : normal skin color and pigmentation [FreeTextEntry1] : RIGHT Frontal

## 2020-02-21 LAB
CULTURE RESULTS: NO GROWTH — SIGNIFICANT CHANGE UP
CULTURE RESULTS: NO GROWTH — SIGNIFICANT CHANGE UP
SPECIMEN SOURCE: SIGNIFICANT CHANGE UP
SPECIMEN SOURCE: SIGNIFICANT CHANGE UP

## 2020-03-12 ENCOUNTER — APPOINTMENT (OUTPATIENT)
Dept: INFECTIOUS DISEASE | Facility: CLINIC | Age: 50
End: 2020-03-12
Payer: COMMERCIAL

## 2020-03-12 VITALS
OXYGEN SATURATION: 100 % | SYSTOLIC BLOOD PRESSURE: 110 MMHG | HEART RATE: 89 BPM | BODY MASS INDEX: 25.7 KG/M2 | TEMPERATURE: 97.5 F | DIASTOLIC BLOOD PRESSURE: 76 MMHG | WEIGHT: 174 LBS

## 2020-03-12 DIAGNOSIS — T81.49XA INFECTION FOLLOWING A PROCEDURE, OTHER SURGICAL SITE, INITIAL ENCOUNTER: ICD-10-CM

## 2020-03-12 PROCEDURE — 99214 OFFICE O/P EST MOD 30 MIN: CPT

## 2020-03-12 NOTE — HISTORY OF PRESENT ILLNESS
[FreeTextEntry1] : 49 year old female here for follow up. She is s/p right craniotomy with stereotactic 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 Garcia 2/2019, s/p RTT/Chemo in [place] 5/2019. She then presented to [facility] ED on 10/23 with complaints of wound drainage since 10/19/2019. On 10/28, she underwent exploration and washout of craniotomy wound with plastics closure. OR cultures grew Staph Capitis and Epidermitis. She was started on vancomycin x 6 weeks. She completed her antibiotics on 12/10. \par \par During outpatient follow up in January it was noted she had some drainage from incision site. On 1/30 she went to the OR for wound debridement, bone flap removal and wound closure. During this time subgaleal pus was noted. Unhealthy granulation tissue was noted and removed. Cultures from OR again showed S. epi. She was started on IV vancomycin which she continues on now.  She has now completed 6 weeks of IV antibiotics. She feels well and has no complaints.  There is no drainage from the wound, no fevers, no headaches.  She feels this is the best it has looked.  \par

## 2020-03-12 NOTE — PHYSICAL EXAM
[General Appearance - Alert] : alert [General Appearance - In No Acute Distress] : in no acute distress [Sclera] : the sclera and conjunctiva were normal [Outer Ear] : the ears and nose were normal in appearance [Neck Appearance] : the appearance of the neck was normal [Respiration, Rhythm And Depth] : normal respiratory rhythm and effort [Heart Rate And Rhythm] : heart rate was normal and rhythm regular [Edema] : there was no peripheral edema [Bowel Sounds] : normal bowel sounds [Abdomen Soft] : soft [No Palpable Adenopathy] : no palpable adenopathy [Musculoskeletal - Swelling] : no joint swelling [] : no rash [Motor Exam] : the motor exam was normal [No Focal Deficits] : no focal deficits [Oriented To Time, Place, And Person] : oriented to person, place, and time [FreeTextEntry1] : scalp wound without erythema, drainage or crusting

## 2020-03-18 ENCOUNTER — APPOINTMENT (OUTPATIENT)
Dept: HEMATOLOGY ONCOLOGY | Facility: CLINIC | Age: 50
End: 2020-03-18

## 2020-06-17 NOTE — DISCHARGE NOTE PROVIDER - NSDCCPCAREPLAN_GEN_ALL_CORE_FT
Here for INR check in coumadin clinic in the office.     CURRENT COUMADIN DOSE:  10mg Tu, Sa and 5mg daily the rest of the week    CHANGES OR COMPLAINTS:  2 alcohol drinks this week     INR RESULTS TODAY:  3.5 ( above goal)    PLAN:  HOLD coumadin one day
PRINCIPAL DISCHARGE DIAGNOSIS  Diagnosis: Wound dehiscence  Assessment and Plan of Treatment: Per ID follow up instructions:   - weekly CBC w/ diff, CMP, ESR and follow up with Dr. Mj Calixto as outpatient on Dec 18th at 10am at [address] office.

## 2020-08-24 ENCOUNTER — TRANSCRIPTION ENCOUNTER (OUTPATIENT)
Age: 50
End: 2020-08-24

## 2020-09-14 NOTE — DISCHARGE NOTE PROVIDER - NSDCQMSTAIRS_GEN_ALL_CORE
No Fluconazole Counseling:  Patient counseled regarding adverse effects of fluconazole including but not limited to headache, diarrhea, nausea, upset stomach, liver function test abnormalities, taste disturbance, and stomach pain.  There is a rare possibility of liver failure that can occur when taking fluconazole.  The patient understands that monitoring of LFTs and kidney function test may be required, especially at baseline. The patient verbalized understanding of the proper use and possible adverse effects of fluconazole.  All of the patient's questions and concerns were addressed.

## 2020-10-01 NOTE — PATIENT PROFILE ADULT - HAS THE PATIENT BEEN ADMITTED FROM SHORT TERM REHAB?
You might feel 'gassy' or "crampy'' for a few hours. This is because air was put into the stomach during the procedure. However, if you have continued abdominal (stomach) pain or swelling, contact your doctor right away. no

## 2020-10-26 NOTE — PROGRESS NOTE ADULT - PROVIDER SPECIALTY LIST ADULT
Heme/Onc
Infectious Disease
NSICU
NSICU
Neurosurgery
Plastic Surgery
Specialty Pharmacy - Refill Coordination    Specialty Medication Orders Linked to Encounter      Most Recent Value   Medication #1  kegzzkrx-duucqkqj-aippak-tenof, STRIBILD, [phone] mg (STRIBILD) [phone] mg per tablet (Order#[id number], Rx#[id number])          Refill Questions - Documented Responses      Most Recent Value   Relationship to patient of person spoken to?  Self   HIPAA/medical authority confirmed?  Yes   Any changes in contact preferences or allowed representatives?  No   Has the patient had any insurance changes?  No   Has the patient had any changes to specialty medication, dose, or instructions?  No   Has the patient started taking any new medications, herbals, or supplements?  No   Has the patient been diagnosed with any new medical conditions?  No   Does the patient have any new allergies to medications or foods?  No   Does the patient have any concerns about side effects?  No   Can the patient store medication/sharps container properly (at the correct temperature, away from children/pets, etc.)?  Yes   Can the patient call emergency services (911) in the event of an emergency?  Yes   Does the patient have any concerns or questions about taking or administering this medication as prescribed?  No   How many doses did the patient miss in the past 4 weeks or since the last fill?  0   How many doses does the patient have on hand?  1   How many days does the patient report on hand quantity will last?  1   Does the number of doses/days supply remaining match pharmacy expected amounts?  Yes   How will the patient receive the medication?  Mail   When does the patient need to receive the medication?  10/27/20   Shipping Address  Home   Address in [facility] confirmed and updated if neccessary?  Yes   Expected Copay ($)  0   Is the patient able to afford the medication copay?  Yes   Payment Method  zero copay   Days supply of Refill  30   Would patient like to speak to a pharmacist?  
Infectious Disease
No   Do you want to trigger an intervention?  No   Do you want to trigger an additional referral task?  No   Refill activity completed?  Yes   Refill activity plan  Refill scheduled   Shipment/Pickup Date:  10/26/20          Current Outpatient Medications   Medication Sig    ammonium lactate 12 % Crea Apply to legs every night after bath or shower    atorvastatin (LIPITOR) 40 MG tablet Take 1 tablet (40 mg total) by mouth once daily.    ybjcbezu-nxhsrron-ajjvue-tenof, STRIBILD, [id number] mg (STRIBILD) [phone] mg per tablet Take 1 tablet by mouth once daily.    HYDROcodone-acetaminophen (NORCO)  mg per tablet Take 1 tablet by mouth every 6 (six) hours as needed for Pain.    valACYclovir (VALTREX) 1000 MG tablet Take one tablet by mouth every day as needed   Last reviewed on 10/26/2020  9:27 AM by Wes Ureña    Review of patient's allergies indicates:   Allergen Reactions    Abacavir Anaphylaxis     Other reaction(s): Anaphylaxis    Sustiva [efavirenz] Anxiety    Last reviewed on  10/26/2020 9:27 AM by Wes Ureña      Tasks added this encounter   11/18/2020 - Refill Call (Auto Added)   Tasks due within next 3 months   12/9/2020 - Clinical - Follow Up Assesement (90 day)     Wes Ureña  [facility]  [address] [place] [id number]  Phone: [phone]  Fax: [phone]

## 2021-01-22 ENCOUNTER — APPOINTMENT (OUTPATIENT)
Dept: NEUROSURGERY | Facility: CLINIC | Age: 51
End: 2021-01-22
Payer: COMMERCIAL

## 2021-01-22 VITALS
BODY MASS INDEX: 27.62 KG/M2 | OXYGEN SATURATION: 98 % | HEART RATE: 83 BPM | TEMPERATURE: 97.6 F | WEIGHT: 187 LBS | DIASTOLIC BLOOD PRESSURE: 71 MMHG | SYSTOLIC BLOOD PRESSURE: 105 MMHG

## 2021-01-22 DIAGNOSIS — C71.9 MALIGNANT NEOPLASM OF BRAIN, UNSPECIFIED: ICD-10-CM

## 2021-01-22 DIAGNOSIS — Z98.890 OTHER SPECIFIED POSTPROCEDURAL STATES: ICD-10-CM

## 2021-01-22 PROCEDURE — 99215 OFFICE O/P EST HI 40 MIN: CPT

## 2021-01-22 PROCEDURE — 99072 ADDL SUPL MATRL&STAF TM PHE: CPT

## 2021-01-23 PROBLEM — Z98.890 S/P CRANIOTOMY: Status: ACTIVE | Noted: 2019-12-09

## 2021-01-23 PROBLEM — C71.9 ASTROCYTOMA: Status: ACTIVE | Noted: 2019-02-07

## 2021-01-23 NOTE — ASSESSMENT
[FreeTextEntry1] : Discussed cranioplasty with patient. Risks, benefits and alternatives to treatment discussed at length with patient.\par Multiple questions answered to patient's satisfaction.\par She verbalizes understanding and would like to proceed with cranioplasty.\par She is getting MRI brain with and without contrast at [facility] - advised patient to obtain copy of disc\par She will also need preoperative CT head with Zeolife protocol prior to surgery.\par After MRI and CT head complete, return to the office for further discussion of cranioplasty.\par \par She will need medical clearance and hematology clearance prior to surgery.\par \par Patient verbalizes agreement and understanding with plan of care.\par

## 2021-01-23 NOTE — PHYSICAL EXAM
[General Appearance - Alert] : alert [General Appearance - In No Acute Distress] : in no acute distress [Dry] : dry [Clean] : clean [Healing Well] : healing well [Intact] : intact [No Drainage] : without drainage [Normal Skin] : normal [Oriented To Time, Place, And Person] : oriented to person, place, and time [Cranial Nerves Optic (II)] : visual acuity intact bilaterally,  pupils equal round and reactive to light [Cranial Nerves Oculomotor (III)] : extraocular motion intact [Cranial Nerves Trigeminal (V)] : facial sensation intact symmetrically [Cranial Nerves Facial (VII)] : face symmetrical [Cranial Nerves Vestibulocochlear (VIII)] : hearing was intact bilaterally [Cranial Nerves Glossopharyngeal (IX)] : tongue and palate midline [Cranial Nerves Accessory (XI - Cranial And Spinal)] : head turning and shoulder shrug symmetric [Cranial Nerves Hypoglossal (XII)] : there was no tongue deviation with protrusion [Motor Tone] : muscle tone was normal in all four extremities [Motor Strength] : muscle strength was normal in all four extremities [Sclera] : the sclera and conjunctiva were normal [Outer Ear] : the ears and nose were normal in appearance [] : no respiratory distress [Neck Appearance] : the appearance of the neck was normal [Abnormal Walk] : normal gait [Skin Color & Pigmentation] : normal skin color and pigmentation

## 2021-01-23 NOTE — REASON FOR VISIT
[Follow-Up: _____] : a [unfilled] follow-up visit [FreeTextEntry1] : s/p RIGHT frontal craniectomy for removal of intracranial abscess on 1/31/2020 [de-identified] : \par

## 2021-01-23 NOTE — HISTORY OF PRESENT ILLNESS
[FreeTextEntry1] : 49 year old woman who presented with severe headaches in January 2018 and workup demonstrated brain mass.\par \par She underwent biopsy of brain mass on 2/1/19 and pathology indicated high grade glioma.\par \par She was undergoing chemotherapy  closer to her family in [place]. Her last treatment was in March 2019. She returned to [place] after this treatment and has since been [place] [facility] [place]. Yuliet Patel.\par \par She presented to [facility] ED on 10/23/19 with purulent drainage and dehiscence from previous right frontal cranial incision site. 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 \par \par She was seen in the office on 1/17/2020 due to nonhealing surgical incision and wound drainage. It was recommended she have craniectomy and wound washout with Dr. Héctor Medellin. She underwent wound washout and RIGHT frontal craniectomy on 1/31/2020.\par \par She returns to the office today to discuss cranioplasty.\par She is doing well. She follows [place]. Yuliet Patel [place] [facility] and is due for repeat MRI brain next week. Dr. Britta Head is her hematologist. Per patient, she receives medication from hematology every few months due to leukopenia. She states she usually gets the injection the same day of MRI.\par \par At today's visit, she denies headaches, focal weakness, nausea/vomiting, changes in vision/speech.

## 2021-02-05 ENCOUNTER — APPOINTMENT (OUTPATIENT)
Dept: CT IMAGING | Facility: CLINIC | Age: 51
End: 2021-02-05
Payer: COMMERCIAL

## 2021-02-05 ENCOUNTER — OUTPATIENT (OUTPATIENT)
Dept: OUTPATIENT SERVICES | Facility: HOSPITAL | Age: 51
LOS: 1 days | End: 2021-02-05

## 2021-02-05 DIAGNOSIS — Z98.890 OTHER SPECIFIED POSTPROCEDURAL STATES: Chronic | ICD-10-CM

## 2021-02-05 DIAGNOSIS — Z90.710 ACQUIRED ABSENCE OF BOTH CERVIX AND UTERUS: Chronic | ICD-10-CM

## 2021-02-05 PROCEDURE — 70450 CT HEAD/BRAIN W/O DYE: CPT | Mod: 26

## 2021-02-08 DIAGNOSIS — Z01.818 ENCOUNTER FOR OTHER PREPROCEDURAL EXAMINATION: ICD-10-CM

## 2021-02-14 ENCOUNTER — EMERGENCY (EMERGENCY)
Facility: HOSPITAL | Age: 51
LOS: 1 days | Discharge: HOME | End: 2021-02-14
Admitting: EMERGENCY MEDICINE
Payer: COMMERCIAL

## 2021-02-14 VITALS
HEIGHT: 69 IN | RESPIRATION RATE: 19 BRPM | HEART RATE: 77 BPM | SYSTOLIC BLOOD PRESSURE: 100 MMHG | DIASTOLIC BLOOD PRESSURE: 67 MMHG | TEMPERATURE: 98 F | OXYGEN SATURATION: 100 %

## 2021-02-14 DIAGNOSIS — Z20.822 CONTACT WITH AND (SUSPECTED) EXPOSURE TO COVID-19: ICD-10-CM

## 2021-02-14 DIAGNOSIS — Z98.890 OTHER SPECIFIED POSTPROCEDURAL STATES: Chronic | ICD-10-CM

## 2021-02-14 DIAGNOSIS — Z90.710 ACQUIRED ABSENCE OF BOTH CERVIX AND UTERUS: Chronic | ICD-10-CM

## 2021-02-14 LAB — SARS-COV-2 RNA SPEC QL NAA+PROBE: SIGNIFICANT CHANGE UP

## 2021-02-14 PROCEDURE — 99282 EMERGENCY DEPT VISIT SF MDM: CPT

## 2021-02-14 NOTE — ED PROVIDER NOTE - PATIENT PORTAL LINK FT
You can access the FollowMyHealth Patient Portal offered by [facility] by registering at the following website: [email or web address]. By joining [facility]’s FollowMyHealth portal, you will also be able to view your health information using other applications (apps) compatible with our system.

## 2021-02-16 ENCOUNTER — LABORATORY RESULT (OUTPATIENT)
Age: 51
End: 2021-02-16

## 2021-02-16 ENCOUNTER — TRANSCRIPTION ENCOUNTER (OUTPATIENT)
Age: 51
End: 2021-02-16

## 2021-02-16 VITALS
WEIGHT: 190.92 LBS | OXYGEN SATURATION: 100 % | TEMPERATURE: 97 F | SYSTOLIC BLOOD PRESSURE: 92 MMHG | DIASTOLIC BLOOD PRESSURE: 59 MMHG | HEIGHT: 68 IN | HEART RATE: 70 BPM | RESPIRATION RATE: 16 BRPM

## 2021-02-16 RX ORDER — POVIDONE-IODINE 5 %
1 AEROSOL (ML) TOPICAL ONCE
Refills: 0 | Status: COMPLETED | OUTPATIENT
Start: 2021-02-17 | End: 2021-02-17

## 2021-02-17 ENCOUNTER — TRANSCRIPTION ENCOUNTER (OUTPATIENT)
Age: 51
End: 2021-02-17

## 2021-02-17 ENCOUNTER — APPOINTMENT (OUTPATIENT)
Dept: NEUROSURGERY | Facility: HOSPITAL | Age: 51
End: 2021-02-17

## 2021-02-17 ENCOUNTER — INPATIENT (INPATIENT)
Facility: HOSPITAL | Age: 51
LOS: 0 days | Discharge: HOME | DRG: 982 | End: 2021-02-18
Attending: NEUROLOGICAL SURGERY | Admitting: NEUROLOGICAL SURGERY
Payer: COMMERCIAL

## 2021-02-17 DIAGNOSIS — Z98.890 OTHER SPECIFIED POSTPROCEDURAL STATES: Chronic | ICD-10-CM

## 2021-02-17 DIAGNOSIS — Z90.710 ACQUIRED ABSENCE OF BOTH CERVIX AND UTERUS: Chronic | ICD-10-CM

## 2021-02-17 LAB
ALBUMIN SERPL ELPH-MCNC: 4.2 G/DL — SIGNIFICANT CHANGE UP (ref 3.3–5)
ALP SERPL-CCNC: 85 U/L — SIGNIFICANT CHANGE UP (ref 40–120)
ALT FLD-CCNC: 15 U/L — SIGNIFICANT CHANGE UP (ref 10–45)
ANION GAP SERPL CALC-SCNC: 9 MMOL/L — SIGNIFICANT CHANGE UP (ref 5–17)
ANISOCYTOSIS BLD QL: SLIGHT — SIGNIFICANT CHANGE UP
APTT BLD: 31.4 SEC — SIGNIFICANT CHANGE UP (ref 27.5–35.5)
AST SERPL-CCNC: 17 U/L — SIGNIFICANT CHANGE UP (ref 10–40)
BASOPHILS # BLD AUTO: 0.06 K/UL — SIGNIFICANT CHANGE UP (ref 0–0.2)
BASOPHILS NFR BLD AUTO: 0.9 % — SIGNIFICANT CHANGE UP (ref 0–2)
BILIRUB SERPL-MCNC: 0.8 MG/DL — SIGNIFICANT CHANGE UP (ref 0.2–1.2)
BLD GP AB SCN SERPL QL: NEGATIVE — SIGNIFICANT CHANGE UP
BUN SERPL-MCNC: 20 MG/DL — SIGNIFICANT CHANGE UP (ref 7–23)
CALCIUM SERPL-MCNC: 9.7 MG/DL — SIGNIFICANT CHANGE UP (ref 8.4–10.5)
CHLORIDE SERPL-SCNC: 107 MMOL/L — SIGNIFICANT CHANGE UP (ref 96–108)
CO2 SERPL-SCNC: 24 MMOL/L — SIGNIFICANT CHANGE UP (ref 22–31)
CREAT SERPL-MCNC: 0.72 MG/DL — SIGNIFICANT CHANGE UP (ref 0.5–1.3)
DACRYOCYTES BLD QL SMEAR: SLIGHT — SIGNIFICANT CHANGE UP
EOSINOPHIL # BLD AUTO: 0.06 K/UL — SIGNIFICANT CHANGE UP (ref 0–0.5)
EOSINOPHIL NFR BLD AUTO: 0.9 % — SIGNIFICANT CHANGE UP (ref 0–6)
GIANT PLATELETS BLD QL SMEAR: PRESENT — SIGNIFICANT CHANGE UP
GLUCOSE SERPL-MCNC: 84 MG/DL — SIGNIFICANT CHANGE UP (ref 70–99)
HCT VFR BLD CALC: 26.5 % — LOW (ref 34.5–45)
HGB BLD-MCNC: 8.6 G/DL — LOW (ref 11.5–15.5)
HYPOCHROMIA BLD QL: SLIGHT — SIGNIFICANT CHANGE UP
INR BLD: 1.06 — SIGNIFICANT CHANGE UP (ref 0.88–1.16)
LYMPHOCYTES # BLD AUTO: 1.48 K/UL — SIGNIFICANT CHANGE UP (ref 1–3.3)
LYMPHOCYTES # BLD AUTO: 22.1 % — SIGNIFICANT CHANGE UP (ref 13–44)
MACROCYTES BLD QL: SLIGHT — SIGNIFICANT CHANGE UP
MANUAL SMEAR VERIFICATION: SIGNIFICANT CHANGE UP
MCHC RBC-ENTMCNC: 32.5 GM/DL — SIGNIFICANT CHANGE UP (ref 32–36)
MCHC RBC-ENTMCNC: 34.7 PG — HIGH (ref 27–34)
MCV RBC AUTO: 106.9 FL — HIGH (ref 80–100)
METAMYELOCYTES # FLD: 4.4 % — HIGH (ref 0–0)
MONOCYTES # BLD AUTO: 0.06 K/UL — SIGNIFICANT CHANGE UP (ref 0–0.9)
MONOCYTES NFR BLD AUTO: 0.9 % — LOW (ref 2–14)
NEUTROPHILS # BLD AUTO: 4.62 K/UL — SIGNIFICANT CHANGE UP (ref 1.8–7.4)
NEUTROPHILS NFR BLD AUTO: 54 % — SIGNIFICANT CHANGE UP (ref 43–77)
NEUTS BAND # BLD: 15 % — HIGH (ref 0–8)
OVALOCYTES BLD QL SMEAR: SIGNIFICANT CHANGE UP
PLAT MORPH BLD: NORMAL — SIGNIFICANT CHANGE UP
PLATELET # BLD AUTO: 125 K/UL — LOW (ref 150–400)
POLYCHROMASIA BLD QL SMEAR: SLIGHT — SIGNIFICANT CHANGE UP
POTASSIUM SERPL-MCNC: 3.9 MMOL/L — SIGNIFICANT CHANGE UP (ref 3.5–5.3)
POTASSIUM SERPL-SCNC: 3.9 MMOL/L — SIGNIFICANT CHANGE UP (ref 3.5–5.3)
PROT SERPL-MCNC: 6.9 G/DL — SIGNIFICANT CHANGE UP (ref 6–8.3)
PROTHROM AB SERPL-ACNC: 12.7 SEC — SIGNIFICANT CHANGE UP (ref 10.6–13.6)
RBC # BLD: 2.48 M/UL — LOW (ref 3.8–5.2)
RBC # FLD: 15.2 % — HIGH (ref 10.3–14.5)
RBC BLD AUTO: ABNORMAL
RH IG SCN BLD-IMP: POSITIVE — SIGNIFICANT CHANGE UP
SMUDGE CELLS # BLD: PRESENT — SIGNIFICANT CHANGE UP
SODIUM SERPL-SCNC: 140 MMOL/L — SIGNIFICANT CHANGE UP (ref 135–145)
VARIANT LYMPHS # BLD: 1.8 % — SIGNIFICANT CHANGE UP (ref 0–6)
WBC # BLD: 6.69 K/UL — SIGNIFICANT CHANGE UP (ref 3.8–10.5)
WBC # FLD AUTO: 6.69 K/UL — SIGNIFICANT CHANGE UP (ref 3.8–10.5)

## 2021-02-17 PROCEDURE — U0005: CPT

## 2021-02-17 PROCEDURE — 62141 CRNOP SKULL DEFECT>5 CM DIAM: CPT

## 2021-02-17 PROCEDURE — 62100 REPAIR BRAIN FLUID LEAKAGE: CPT

## 2021-02-17 PROCEDURE — 70450 CT HEAD/BRAIN W/O DYE: CPT | Mod: 26

## 2021-02-17 PROCEDURE — 62141 CRNOP SKULL DEFECT>5 CM DIAM: CPT | Mod: AS

## 2021-02-17 PROCEDURE — U0003: CPT

## 2021-02-17 PROCEDURE — 99283 EMERGENCY DEPT VISIT LOW MDM: CPT

## 2021-02-17 RX ORDER — METOCLOPRAMIDE HCL 10 MG
10 TABLET ORAL EVERY 8 HOURS
Refills: 0 | Status: DISCONTINUED | OUTPATIENT
Start: 2021-02-17 | End: 2021-02-18

## 2021-02-17 RX ORDER — ACETAMINOPHEN 500 MG
650 TABLET ORAL EVERY 6 HOURS
Refills: 0 | Status: DISCONTINUED | OUTPATIENT
Start: 2021-02-17 | End: 2021-02-18

## 2021-02-17 RX ORDER — TRAMADOL HYDROCHLORIDE 50 MG/1
50 TABLET ORAL EVERY 6 HOURS
Refills: 0 | Status: DISCONTINUED | OUTPATIENT
Start: 2021-02-17 | End: 2021-02-18

## 2021-02-17 RX ORDER — CEFAZOLIN SODIUM 1 G
2000 VIAL (EA) INJECTION EVERY 8 HOURS
Refills: 0 | Status: DISCONTINUED | OUTPATIENT
Start: 2021-02-17 | End: 2021-02-18

## 2021-02-17 RX ORDER — ONDANSETRON 8 MG/1
4 TABLET, FILM COATED ORAL EVERY 6 HOURS
Refills: 0 | Status: DISCONTINUED | OUTPATIENT
Start: 2021-02-17 | End: 2021-02-18

## 2021-02-17 RX ORDER — SODIUM CHLORIDE 9 MG/ML
1000 INJECTION INTRAMUSCULAR; INTRAVENOUS; SUBCUTANEOUS
Refills: 0 | Status: DISCONTINUED | OUTPATIENT
Start: 2021-02-17 | End: 2021-02-18

## 2021-02-17 RX ORDER — CHLORHEXIDINE GLUCONATE 213 G/1000ML
1 SOLUTION TOPICAL ONCE
Refills: 0 | Status: COMPLETED | OUTPATIENT
Start: 2021-02-17 | End: 2021-02-17

## 2021-02-17 RX ORDER — OXYCODONE AND ACETAMINOPHEN 5; 325 MG/1; MG/1
1 TABLET ORAL EVERY 4 HOURS
Refills: 0 | Status: DISCONTINUED | OUTPATIENT
Start: 2021-02-17 | End: 2021-02-18

## 2021-02-17 RX ORDER — SENNA PLUS 8.6 MG/1
2 TABLET ORAL AT BEDTIME
Refills: 0 | Status: DISCONTINUED | OUTPATIENT
Start: 2021-02-17 | End: 2021-02-18

## 2021-02-17 RX ORDER — CEFAZOLIN SODIUM 1 G
2000 VIAL (EA) INJECTION EVERY 8 HOURS
Refills: 0 | Status: DISCONTINUED | OUTPATIENT
Start: 2021-02-17 | End: 2021-02-17

## 2021-02-17 RX ADMIN — Medication 1 APPLICATION(S): at 06:53

## 2021-02-17 RX ADMIN — Medication 100 MILLIGRAM(S): at 17:28

## 2021-02-17 RX ADMIN — CHLORHEXIDINE GLUCONATE 1 APPLICATION(S): 213 SOLUTION TOPICAL at 06:53

## 2021-02-17 RX ADMIN — Medication 1 TABLET(S): at 17:43

## 2021-02-17 RX ADMIN — SENNA PLUS 2 TABLET(S): 8.6 TABLET ORAL at 22:04

## 2021-02-17 RX ADMIN — SODIUM CHLORIDE 75 MILLILITER(S): 9 INJECTION INTRAMUSCULAR; INTRAVENOUS; SUBCUTANEOUS at 14:00

## 2021-02-17 NOTE — H&P ADULT - NSHPPHYSICALEXAM_GEN_ALL_CORE
Constitutional:  51 y/o female awake, alert in no acute distress.  Eyes:  Sclera anicteric, conjunctiva noninjected.  ENMT: Oropharyngeal mucosa moist, pink. Tongue midline.    Neck: Neck supple, FROM.  No appreciable lymphadenopathy.  Back:  No pain to palpation/percussion of low back. No CVA tenderness.  Respiratory: Clear to auscultation bilaterally.  No rales, rhonchi, wheezes.  Cardiovascular: Regular rate and rhythm.  S1, S2 heard.  Gastrointestinal:  Soft, nontender, nondistended.  +BS.  Genitourinary:  Deferred.  Rectal: Deferred.  Vascular: Extremities warm, no ulcers, no discoloration of skin.   Neurological: Gen: AA&O x 3, conversant, appropriate.  PERRL, EOMI.    CN II-XII grossly intact.    Motor: HARMON x 4, 5/5 throughout UE/LE.    Sens: Sensation intact to light touch throughout.    Hoffmans negative bilaterally.  Plantar downgoing bilaterally.  No clonus.      No pronator drift, no dysmetria.  Skin: Warm, dry, no erythema.

## 2021-02-17 NOTE — H&P ADULT - NSICDXPASTMEDICALHX_GEN_ALL_CORE_FT
PAST MEDICAL HISTORY:  Brain mass high grade glioma    Fibroids     Intracranial abscess and granuloma 1/31/2020    Pancytopenia

## 2021-02-17 NOTE — H&P ADULT - HISTORY OF PRESENT ILLNESS
51 y/o female presents for cranioplasty today.  Her history includes right craniotomy for brain biopsy 2/2019 with pathology high grade glioma, treated with radiation and temozolamide in [place] in 2019, developed pancytopenia/aplastic anemia.  She had drainage from incision and underwent washout 10/2019 which grew staph capitis and epidermis, she was treated with IV antibiotics.  She underwent debridement and craniectomy 1/30/2020 for intracranial abscess and continued on IV antibiotics.  She continues with pancytopenia and follows with both Dr. [facility] for [facility] and Dr. Britta Head for heme.  She denies drainage, redness from the incision site, fevers, chills.

## 2021-02-17 NOTE — PROGRESS NOTE ADULT - SUBJECTIVE AND OBJECTIVE BOX
NEUROSURGERY POST OP NOTE:    POD# 0 S/P Cranioplasty, with bone flap replacement    S: Pt examined in PACU, lying in bed A&O x3, appears in no acute distress. VSS.   Pt denies any pain, dizziness, HA, dizziness.       T(C): 36.6 (02-17-21 @ 10:13), Max: 36.6 (02-17-21 @ 10:13)  HR: 60 (02-17-21 @ 10:43) (60 - 77)  BP: 101/63 (02-17-21 @ 10:43) (92/59 - 103/63)  RR: 13 (02-17-21 @ 10:43) (12 - 17)  SpO2: 100% (02-17-21 @ 10:43) (100% - 100%)      02-17-21 @ 07:01  -  02-17-21 @ 10:49  --------------------------------------------------------  IN: 75 mL / OUT: 0 mL / NET: 75 mL        acetaminophen   Tablet .. 650 milliGRAM(s) Oral every 6 hours PRN  ceFAZolin   IVPB 2000 milliGRAM(s) IV Intermittent every 8 hours  metoclopramide Injectable 10 milliGRAM(s) IV Push every 8 hours PRN  multivitamin 1 Tablet(s) Oral daily  ondansetron Injectable 4 milliGRAM(s) IV Push every 6 hours PRN  oxycodone    5 mG/acetaminophen 325 mG 1 Tablet(s) Oral every 4 hours PRN  senna 2 Tablet(s) Oral at bedtime  sodium chloride 0.9%. 1000 milliLiter(s) IV Continuous <Continuous>  traMADol 50 milliGRAM(s) Oral every 6 hours PRN      RADIOLOGY: 2/5/21 CT head no contrast. [place] protocol. Status post interval removal of the right frontal bone from the patient's prior craniotomy. No evidence of recent bone changes to suggest current infection at the craniectomy site.  Pending CT Head today 2/17/21.     Exam:   Physical Exam:   General: Sitting in bed in NAD.  Eyes:  Sclera anicteric, conjunctiva noninjected.   Vascular: Extremities warm, no ulcers, no discoloration of skin.   Neurological: Gen: AA&O x 3, PERRL, EOMI. Follows commands.     CN II-XII grossly intact.    Motor: Moving all extremities. UE 5/5. LLE 4+/5, RLE 5/5.    Sens: Sensation grossly intact throughout.    No pronator drift.  Skin: Warm, dry, no erythema.    WOUND/DRAINS: Right cranioplasty wound closure C/D/I.     DEVICES: 18G and 20G IV lines placed in the OR, maintain lines overnight.       Assessment: 50yFemale w/ pmhx of high grade glioma dx 2019 s/p crani/bx, with intracranial abscess s/p 1/31/2020 Rt craniectomy and debridement, with pancytopenia/aplastic anemia, s/p canioplasty, with bone flap replacement today (2/17).         Plan:  -PACU to Tele  -Neurochecks q4h  -Vital checks q4h  -CT Head pending  -Pain control w/ Tylenol, Tramadol, Percocet prn  -Normotensive BP goals  -Satting well on RA continue Incentive spirometry  -ANCEF 2 g IV while admitted   -2 Units Platelets transfused Intra-Op, CBC post-op platelets 125  -Plan to d/c tomorrow 2/18    Assessment:  Present when checked  Plan discussed in detail w/ Dr. Fitzpatrick     []  GCS  E   V  M     Heart Failure: []Acute, [] acute on chronic , []chronic  Heart Failure:  [] Diastolic (HFpEF), [] Systolic (HFrEF), []Combined (HFpEF and HFrEF), [] RHF, [] Pulm HTN, [] Other    [] IRMA, [] ATN, [] AIN, [] other  [] CKD1, [] CKD2, [] CKD 3, [] CKD 4, [] CKD 5, []ESRD    Encephalopathy: [] Metabolic, [] Hepatic, [] toxic, [] Neurological, [] Other    Abnormal Nurtitional Status: [] malnurtition (see nutrition note), [ ]underweight: BMI < 19, [] morbid obesity: BMI >40, [] Cachexia    [] Sepsis  [] hypovolemic shock,[] cardiogenic shock, [] hemorrhagic shock, [] neuogenic shock  [] Acute Respiratory Failure  []Cerebral edema, [] Brain compression/ herniation,   [] Functional quadriplegia  [] Acute blood loss anemia       NEUROSURGERY POST OP NOTE:    POD# 0 S/P Cranioplasty, with bone flap replacement    S: Pt examined in PACU, lying in bed A&O x3, appears in no acute distress. VSS.   Pt denies any pain, dizziness, HA, dizziness.       T(C): 36.6 (02-17-21 @ 10:13), Max: 36.6 (02-17-21 @ 10:13)  HR: 60 (02-17-21 @ 10:43) (60 - 77)  BP: 101/63 (02-17-21 @ 10:43) (92/59 - 103/63)  RR: 13 (02-17-21 @ 10:43) (12 - 17)  SpO2: 100% (02-17-21 @ 10:43) (100% - 100%)      02-17-21 @ 07:01  -  02-17-21 @ 10:49  --------------------------------------------------------  IN: 75 mL / OUT: 0 mL / NET: 75 mL        acetaminophen   Tablet .. 650 milliGRAM(s) Oral every 6 hours PRN  ceFAZolin   IVPB 2000 milliGRAM(s) IV Intermittent every 8 hours  metoclopramide Injectable 10 milliGRAM(s) IV Push every 8 hours PRN  multivitamin 1 Tablet(s) Oral daily  ondansetron Injectable 4 milliGRAM(s) IV Push every 6 hours PRN  oxycodone    5 mG/acetaminophen 325 mG 1 Tablet(s) Oral every 4 hours PRN  senna 2 Tablet(s) Oral at bedtime  sodium chloride 0.9%. 1000 milliLiter(s) IV Continuous <Continuous>  traMADol 50 milliGRAM(s) Oral every 6 hours PRN      RADIOLOGY: 2/5/21 CT head no contrast. [place] protocol. Status post interval removal of the right frontal bone from the patient's prior craniotomy. No evidence of recent bone changes to suggest current infection at the craniectomy site.  Pending CT Head today 2/17/21.     Exam:   Physical Exam:   General: Sitting in bed in NAD.  Eyes:  Sclera anicteric, conjunctiva noninjected.   Vascular: Extremities warm, no ulcers, no discoloration of skin.   Neurological: Gen: AA&O x 3, PERRL, EOMI. Follows commands.     CN II-XII grossly intact.    Motor: Moving all extremities. UE 5/5. LLE 4+/5, RLE 5/5.    Sens: Sensation grossly intact throughout.    No pronator drift.  Skin: Warm, dry, no erythema.    WOUND/DRAINS: Right cranioplasty wound closure C/D/I.     DEVICES: 18G and 20G IV lines placed in the OR, maintain lines overnight.       Assessment: 50yFemale w/ pmhx of high grade glioma dx 2019 s/p crani/bx, with intracranial abscess s/p 1/31/2020 Rt craniectomy and debridement, with pancytopenia/aplastic anemia, s/p canioplasty, with bone flap replacement today (2/17).         Plan:  -PACU to Tele  -Neurochecks q4h  -Vital checks q4h  -CT Head pending  -Pain control w/ Tylenol, Tramadol, Percocet prn  -Normotensive BP goals  -Satting well on RA, continue Incentive spirometry  -ANCEF 2 g IV while admitted for chronic neutropenia  -2 Units Platelets transfused Intra-Op, CBC post-op platelets 125  -Plan to d/c tomorrow 2/18    Assessment:  Present when checked  Plan discussed in detail w/ Dr. Fitzpatrick     []  GCS  E   V  M     Heart Failure: []Acute, [] acute on chronic , []chronic  Heart Failure:  [] Diastolic (HFpEF), [] Systolic (HFrEF), []Combined (HFpEF and HFrEF), [] RHF, [] Pulm HTN, [] Other    [] IRMA, [] ATN, [] AIN, [] other  [] CKD1, [] CKD2, [] CKD 3, [] CKD 4, [] CKD 5, []ESRD    Encephalopathy: [] Metabolic, [] Hepatic, [] toxic, [] Neurological, [] Other    Abnormal Nurtitional Status: [] malnurtition (see nutrition note), [ ]underweight: BMI < 19, [] morbid obesity: BMI >40, [] Cachexia    [] Sepsis  [] hypovolemic shock,[] cardiogenic shock, [] hemorrhagic shock, [] neuogenic shock  [] Acute Respiratory Failure  []Cerebral edema, [] Brain compression/ herniation,   [] Functional quadriplegia  [] Acute blood loss anemia       NEUROSURGERY POST OP NOTE:    POD# 0 S/P Cranioplasty, with bone flap replacement    S: Pt examined in PACU, lying in bed A&O x3, appears in no acute distress. VSS.   Pt denies any pain, dizziness, HA, dizziness.       T(C): 36.6 (02-17-21 @ 10:13), Max: 36.6 (02-17-21 @ 10:13)  HR: 60 (02-17-21 @ 10:43) (60 - 77)  BP: 101/63 (02-17-21 @ 10:43) (92/59 - 103/63)  RR: 13 (02-17-21 @ 10:43) (12 - 17)  SpO2: 100% (02-17-21 @ 10:43) (100% - 100%)      02-17-21 @ 07:01  -  02-17-21 @ 10:49  --------------------------------------------------------  IN: 75 mL / OUT: 0 mL / NET: 75 mL        acetaminophen   Tablet .. 650 milliGRAM(s) Oral every 6 hours PRN  ceFAZolin   IVPB 2000 milliGRAM(s) IV Intermittent every 8 hours  metoclopramide Injectable 10 milliGRAM(s) IV Push every 8 hours PRN  multivitamin 1 Tablet(s) Oral daily  ondansetron Injectable 4 milliGRAM(s) IV Push every 6 hours PRN  oxycodone    5 mG/acetaminophen 325 mG 1 Tablet(s) Oral every 4 hours PRN  senna 2 Tablet(s) Oral at bedtime  sodium chloride 0.9%. 1000 milliLiter(s) IV Continuous <Continuous>  traMADol 50 milliGRAM(s) Oral every 6 hours PRN      RADIOLOGY: 2/5/21 CT head no contrast. [place] protocol. Status post interval removal of the right frontal bone from the patient's prior craniotomy. No evidence of recent bone changes to suggest current infection at the craniectomy site.  Pending CT Head today 2/17/21.     Exam:   Physical Exam:   General: Sitting in bed in NAD.  Eyes:  Sclera anicteric, conjunctiva noninjected.   Vascular: Extremities warm, no ulcers, no discoloration of skin.   Neurological: Gen: AA&O x 3, PERRL, EOMI. Follows commands.     CN II-XII grossly intact.    Motor: Moving all extremities. UE 5/5. LLE 4+/5, RLE 5/5.    Sens: Sensation grossly intact throughout.    No pronator drift.  Skin: Warm, dry, no erythema.    WOUND/DRAINS: Right cranioplasty wound closure C/D/I.     DEVICES: 18G and 20G IV lines placed in the OR, maintain lines overnight.       Assessment: 50yFemale w/ pmhx of high grade glioma dx 2019 s/p crani/bx, with intracranial abscess s/p 1/31/2020 Rt craniectomy and debridement, with pancytopenia/aplastic anemia, s/p canioplasty, with bone flap replacement today (2/17).         Plan:  -PACU to Tele  -Neurochecks q4h  -Vital checks q4h  -CT Head pending  -Pain control w/ Tylenol, Tramadol, Percocet prn  -Normotensive BP goals  -Satting well on RA, continue Incentive spirometry  -Advance diet as tolerated  -ANCEF 2 g IV while admitted for chronic neutropenia  -2 Units Platelets transfused Intra-Op, CBC post-op platelets 125  -Plan to d/c tomorrow 2/18    Assessment:  Present when checked  Plan discussed in detail w/ Dr. Fitzpatrick     []  GCS  E   V  M     Heart Failure: []Acute, [] acute on chronic , []chronic  Heart Failure:  [] Diastolic (HFpEF), [] Systolic (HFrEF), []Combined (HFpEF and HFrEF), [] RHF, [] Pulm HTN, [] Other    [] IRMA, [] ATN, [] AIN, [] other  [] CKD1, [] CKD2, [] CKD 3, [] CKD 4, [] CKD 5, []ESRD    Encephalopathy: [] Metabolic, [] Hepatic, [] toxic, [] Neurological, [] Other    Abnormal Nurtitional Status: [] malnurtition (see nutrition note), [ ]underweight: BMI < 19, [] morbid obesity: BMI >40, [] Cachexia    [] Sepsis  [] hypovolemic shock,[] cardiogenic shock, [] hemorrhagic shock, [] neuogenic shock  [] Acute Respiratory Failure  []Cerebral edema, [] Brain compression/ herniation,   [] Functional quadriplegia  [] Acute blood loss anemia       NEUROSURGERY POST OP NOTE:    POD# 0 S/P Cranioplasty, with bone flap replacement    S: Pt examined in PACU, lying in bed A&O x3, appears in no acute distress. VSS.   Pt denies any pain, dizziness, HA, dizziness.       T(C): 36.6 (02-17-21 @ 10:13), Max: 36.6 (02-17-21 @ 10:13)  HR: 60 (02-17-21 @ 10:43) (60 - 77)  BP: 101/63 (02-17-21 @ 10:43) (92/59 - 103/63)  RR: 13 (02-17-21 @ 10:43) (12 - 17)  SpO2: 100% (02-17-21 @ 10:43) (100% - 100%)      02-17-21 @ 07:01  -  02-17-21 @ 10:49  --------------------------------------------------------  IN: 75 mL / OUT: 0 mL / NET: 75 mL        acetaminophen   Tablet .. 650 milliGRAM(s) Oral every 6 hours PRN  ceFAZolin   IVPB 2000 milliGRAM(s) IV Intermittent every 8 hours  metoclopramide Injectable 10 milliGRAM(s) IV Push every 8 hours PRN  multivitamin 1 Tablet(s) Oral daily  ondansetron Injectable 4 milliGRAM(s) IV Push every 6 hours PRN  oxycodone    5 mG/acetaminophen 325 mG 1 Tablet(s) Oral every 4 hours PRN  senna 2 Tablet(s) Oral at bedtime  sodium chloride 0.9%. 1000 milliLiter(s) IV Continuous <Continuous>  traMADol 50 milliGRAM(s) Oral every 6 hours PRN      RADIOLOGY: 2/5/21 CT head no contrast. [place] protocol. Status post interval removal of the right frontal bone from the patient's prior craniotomy. No evidence of recent bone changes to suggest current infection at the craniectomy site.  Pending CT Head today 2/17/21.     Exam:   Physical Exam:   General: Sitting in bed in NAD.  Eyes:  Sclera anicteric, conjunctiva noninjected.   Vascular: Extremities warm, no ulcers, no discoloration of skin.   Neurological: Gen: AA&O x 3, PERRL, EOMI. Follows commands.     CN II-XII grossly intact.    Motor: Moving all extremities. UE 5/5. LLE 4+/5, RLE 5/5.    Sens: Sensation grossly intact throughout.    No pronator drift.  Skin: Warm, dry, no erythema.    WOUND/DRAINS: Right cranioplasty wound closure C/D/I.     DEVICES: 18G and 20G IV lines placed in the OR, maintain lines overnight.       Assessment: 50yFemale w/ pmhx of high grade glioma dx 2019 s/p crani/bx, with intracranial abscess s/p 1/31/2020 Rt craniectomy and debridement, with pancytopenia/aplastic anemia, s/p canioplasty, with bone flap replacement today (2/17).         Plan:  -PACU to Tele  -Neurochecks q4h  -Vital checks q4h  -CT Head pending  -Pain control w/ Tylenol, Tramadol, Percocet prn  -Normotensive BP goals  -Satting well on RA, continue Incentive spirometry  -Advance diet as tolerated  -OOB/PT as tolerated  -ANCEF 2 g IV while admitted for chronic neutropenia  -2 Units Platelets transfused Intra-Op, CBC post-op platelets 125  -Plan to d/c tomorrow 2/18    Assessment:  Present when checked  Plan discussed in detail w/ Dr. Fitzpatrick     []  GCS  E   V  M     Heart Failure: []Acute, [] acute on chronic , []chronic  Heart Failure:  [] Diastolic (HFpEF), [] Systolic (HFrEF), []Combined (HFpEF and HFrEF), [] RHF, [] Pulm HTN, [] Other    [] IRMA, [] ATN, [] AIN, [] other  [] CKD1, [] CKD2, [] CKD 3, [] CKD 4, [] CKD 5, []ESRD    Encephalopathy: [] Metabolic, [] Hepatic, [] toxic, [] Neurological, [] Other    Abnormal Nurtitional Status: [] malnurtition (see nutrition note), [ ]underweight: BMI < 19, [] morbid obesity: BMI >40, [] Cachexia    [] Sepsis  [] hypovolemic shock,[] cardiogenic shock, [] hemorrhagic shock, [] neuogenic shock  [] Acute Respiratory Failure  []Cerebral edema, [] Brain compression/ herniation,   [] Functional quadriplegia  [] Acute blood loss anemia

## 2021-02-17 NOTE — H&P ADULT - ASSESSMENT
49 y/o female with hx high grade glioma dx 2019 s/p crani/bx, with intracranial abscess s/p 1/31/2020 Rt craniectomy and debridement, with pancytopenia/aplastic anemia, for cranioplasty today.    - NPO  - platelets stat  - repeat labs  - 3M  - SCDs  - dopplers on admission    All above d/w Dr. Fitzpatrick.

## 2021-02-18 ENCOUNTER — TRANSCRIPTION ENCOUNTER (OUTPATIENT)
Age: 51
End: 2021-02-18

## 2021-02-18 VITALS
OXYGEN SATURATION: 100 % | RESPIRATION RATE: 18 BRPM | SYSTOLIC BLOOD PRESSURE: 93 MMHG | HEART RATE: 74 BPM | DIASTOLIC BLOOD PRESSURE: 55 MMHG

## 2021-02-18 PROBLEM — G06.0 INTRACRANIAL ABSCESS AND GRANULOMA: Chronic | Status: ACTIVE | Noted: 2020-01-29

## 2021-02-18 PROBLEM — D61.818 OTHER PANCYTOPENIA: Chronic | Status: ACTIVE | Noted: 2021-02-17

## 2021-02-18 PROBLEM — G93.9 DISORDER OF BRAIN, UNSPECIFIED: Chronic | Status: ACTIVE | Noted: 2019-01-14

## 2021-02-18 LAB
ALBUMIN SERPL ELPH-MCNC: 4.4 G/DL
ALP BLD-CCNC: 88 U/L
ALT SERPL-CCNC: 16 U/L
ANION GAP SERPL CALC-SCNC: 11 MMOL/L
ANION GAP SERPL CALC-SCNC: 14 MMOL/L
ANION GAP SERPL CALC-SCNC: 9 MMOL/L — SIGNIFICANT CHANGE UP (ref 5–17)
APTT BLD: 25 SEC
AST SERPL-CCNC: 19 U/L
BASOPHILS # BLD AUTO: 0.03 K/UL
BASOPHILS NFR BLD AUTO: 1.1 %
BILIRUB SERPL-MCNC: 0.5 MG/DL
BUN SERPL-MCNC: 11 MG/DL — SIGNIFICANT CHANGE UP (ref 7–23)
BUN SERPL-MCNC: 19 MG/DL
BUN SERPL-MCNC: 20 MG/DL
CALCIUM SERPL-MCNC: 9.7 MG/DL
CALCIUM SERPL-MCNC: 9.7 MG/DL
CALCIUM SERPL-MCNC: 9.8 MG/DL — SIGNIFICANT CHANGE UP (ref 8.4–10.5)
CHLORIDE SERPL-SCNC: 104 MMOL/L
CHLORIDE SERPL-SCNC: 105 MMOL/L
CHLORIDE SERPL-SCNC: 106 MMOL/L — SIGNIFICANT CHANGE UP (ref 96–108)
CO2 SERPL-SCNC: 20 MMOL/L
CO2 SERPL-SCNC: 22 MMOL/L
CO2 SERPL-SCNC: 26 MMOL/L — SIGNIFICANT CHANGE UP (ref 22–31)
CREAT SERPL-MCNC: 0.64 MG/DL — SIGNIFICANT CHANGE UP (ref 0.5–1.3)
CREAT SERPL-MCNC: 0.76 MG/DL
CREAT SERPL-MCNC: 0.79 MG/DL
DOHLE BOD BLD QL SMEAR: PRESENT — SIGNIFICANT CHANGE UP
EOSINOPHIL # BLD AUTO: 0.02 K/UL
EOSINOPHIL NFR BLD AUTO: 0.7 %
GIANT PLATELETS BLD QL SMEAR: PRESENT — SIGNIFICANT CHANGE UP
GLUCOSE SERPL-MCNC: 82 MG/DL — SIGNIFICANT CHANGE UP (ref 70–99)
GLUCOSE SERPL-MCNC: 86 MG/DL
GLUCOSE SERPL-MCNC: 87 MG/DL
HCT VFR BLD CALC: 27.8 % — LOW (ref 34.5–45)
HCT VFR BLD CALC: 33.9 %
HGB BLD-MCNC: 10.7 G/DL
HGB BLD-MCNC: 8.8 G/DL — LOW (ref 11.5–15.5)
HYPOCHROMIA BLD QL: SLIGHT — SIGNIFICANT CHANGE UP
IMM GRANULOCYTES NFR BLD AUTO: 0.7 %
INR PPP: 1.04 RATIO
LG PLATELETS BLD QL AUTO: SLIGHT — SIGNIFICANT CHANGE UP
LYMPHOCYTES # BLD AUTO: 1.48 K/UL
LYMPHOCYTES NFR BLD AUTO: 52.7 %
MACROCYTES BLD QL: SIGNIFICANT CHANGE UP
MAGNESIUM SERPL-MCNC: 1.6 MG/DL — SIGNIFICANT CHANGE UP (ref 1.6–2.6)
MAN DIFF?: NORMAL
MANUAL SMEAR VERIFICATION: SIGNIFICANT CHANGE UP
MCHC RBC-ENTMCNC: 31.6 GM/DL
MCHC RBC-ENTMCNC: 31.7 GM/DL — LOW (ref 32–36)
MCHC RBC-ENTMCNC: 35.1 PG — HIGH (ref 27–34)
MCHC RBC-ENTMCNC: 35.3 PG
MCV RBC AUTO: 110.8 FL — HIGH (ref 80–100)
MCV RBC AUTO: 111.9 FL
MONOCYTES # BLD AUTO: 0.24 K/UL
MONOCYTES NFR BLD AUTO: 8.5 %
NEUTROPHILS # BLD AUTO: 1.02 K/UL
NEUTROPHILS NFR BLD AUTO: 36.3 %
NEUTS VAC BLD QL SMEAR: SLIGHT — SIGNIFICANT CHANGE UP
NRBC # BLD: 0 /100 WBCS — SIGNIFICANT CHANGE UP (ref 0–0)
OVALOCYTES BLD QL SMEAR: SLIGHT — SIGNIFICANT CHANGE UP
PHOSPHATE SERPL-MCNC: 4.3 MG/DL — SIGNIFICANT CHANGE UP (ref 2.5–4.5)
PLAT MORPH BLD: ABNORMAL
PLATELET # BLD AUTO: 124 K/UL — LOW (ref 150–400)
PLATELET # BLD AUTO: NORMAL K/UL
PLATELET CLUMP BLD QL SMEAR: SLIGHT
POTASSIUM SERPL-MCNC: 4 MMOL/L — SIGNIFICANT CHANGE UP (ref 3.5–5.3)
POTASSIUM SERPL-SCNC: 4 MMOL/L — SIGNIFICANT CHANGE UP (ref 3.5–5.3)
POTASSIUM SERPL-SCNC: 4.6 MMOL/L
POTASSIUM SERPL-SCNC: 4.7 MMOL/L
PROT SERPL-MCNC: 6.8 G/DL
PT BLD: 12.3 SEC
RBC # BLD: 2.51 M/UL — LOW (ref 3.8–5.2)
RBC # BLD: 3.03 M/UL
RBC # FLD: 15.7 %
RBC # FLD: 15.9 % — HIGH (ref 10.3–14.5)
RBC BLD AUTO: ABNORMAL
SODIUM SERPL-SCNC: 138 MMOL/L
SODIUM SERPL-SCNC: 139 MMOL/L
SODIUM SERPL-SCNC: 141 MMOL/L — SIGNIFICANT CHANGE UP (ref 135–145)
TOXIC GRANULES BLD QL SMEAR: PRESENT — SIGNIFICANT CHANGE UP
WBC # BLD: 16.24 K/UL — HIGH (ref 3.8–10.5)
WBC # FLD AUTO: 16.24 K/UL — HIGH (ref 3.8–10.5)
WBC # FLD AUTO: 2.81 K/UL

## 2021-02-18 PROCEDURE — C1889: CPT

## 2021-02-18 PROCEDURE — 86901 BLOOD TYPING SEROLOGIC RH(D): CPT

## 2021-02-18 PROCEDURE — 85027 COMPLETE CBC AUTOMATED: CPT

## 2021-02-18 PROCEDURE — 99238 HOSP IP/OBS DSCHRG MGMT 30/<: CPT

## 2021-02-18 PROCEDURE — 83735 ASSAY OF MAGNESIUM: CPT

## 2021-02-18 PROCEDURE — C1713: CPT

## 2021-02-18 PROCEDURE — 86850 RBC ANTIBODY SCREEN: CPT

## 2021-02-18 PROCEDURE — 85730 THROMBOPLASTIN TIME PARTIAL: CPT

## 2021-02-18 PROCEDURE — 85610 PROTHROMBIN TIME: CPT

## 2021-02-18 PROCEDURE — 36430 TRANSFUSION BLD/BLD COMPNT: CPT

## 2021-02-18 PROCEDURE — 85025 COMPLETE CBC W/AUTO DIFF WBC: CPT

## 2021-02-18 PROCEDURE — 84100 ASSAY OF PHOSPHORUS: CPT

## 2021-02-18 PROCEDURE — P9037: CPT

## 2021-02-18 PROCEDURE — 36415 COLL VENOUS BLD VENIPUNCTURE: CPT

## 2021-02-18 PROCEDURE — 70450 CT HEAD/BRAIN W/O DYE: CPT

## 2021-02-18 PROCEDURE — 86900 BLOOD TYPING SEROLOGIC ABO: CPT

## 2021-02-18 PROCEDURE — 80053 COMPREHEN METABOLIC PANEL: CPT

## 2021-02-18 PROCEDURE — 97161 PT EVAL LOW COMPLEX 20 MIN: CPT

## 2021-02-18 PROCEDURE — 80048 BASIC METABOLIC PNL TOTAL CA: CPT

## 2021-02-18 RX ORDER — MAGNESIUM OXIDE 400 MG ORAL TABLET 241.3 MG
400 TABLET ORAL ONCE
Refills: 0 | Status: COMPLETED | OUTPATIENT
Start: 2021-02-18 | End: 2021-02-18

## 2021-02-18 RX ORDER — ACETAMINOPHEN 500 MG
2 TABLET ORAL
Qty: 0 | Refills: 0 | DISCHARGE
Start: 2021-02-18

## 2021-02-18 RX ORDER — BENZOCAINE AND MENTHOL 5; 1 G/100ML; G/100ML
1 LIQUID ORAL
Refills: 0 | Status: DISCONTINUED | OUTPATIENT
Start: 2021-02-18 | End: 2021-02-18

## 2021-02-18 RX ADMIN — Medication 1 TABLET(S): at 11:00

## 2021-02-18 RX ADMIN — Medication 100 MILLIGRAM(S): at 00:33

## 2021-02-18 RX ADMIN — MAGNESIUM OXIDE 400 MG ORAL TABLET 400 MILLIGRAM(S): 241.3 TABLET ORAL at 10:59

## 2021-02-18 NOTE — DISCHARGE NOTE PROVIDER - HOSPITAL COURSE
51 y/o female presents for cranioplasty today.  Her history includes right craniotomy for brain biopsy 2/2019 with pathology high grade glioma, treated with radiation and temozolamide in [place] in 2019, developed pancytopenia/aplastic anemia.  She had drainage from incision and underwent washout 10/2019 which grew staph capitis and epidermis, she was treated with IV antibiotics.  She underwent debridement and craniectomy 1/30/2020 for intracranial abscess and continued on IV antibiotics.  She continues with pancytopenia and [place] with both [place]. [place]uliet [place]el [place] [facility] [place]. Britta Head for heme.  She denies drainage, redness from the incision site, fevers, chills.    Hospital Course:  2/17: POD# 0 S/P Cranioplasty, with bone flap replacement  2/18: POD#1, WILMA overnight, neuro exam stable, patient to be discharged home today 51 y/o female presents for cranioplasty today.  Her history includes right craniotomy for brain biopsy 2/2019 with pathology high grade glioma, treated with radiation and temozolamide in [place] in 2019, developed pancytopenia/aplastic anemia.  She had drainage from incision and underwent washout 10/2019 which grew staph capitis and epidermis, she was treated with IV antibiotics.  She underwent debridement and craniectomy 1/30/2020 for intracranial abscess and continued on IV antibiotics.  She continues with pancytopenia and follows with both Dr. Yuliet Patel at [facility] for onc and Dr. Britta Head for heme.  She denies drainage, redness from the incision site, fevers, chills.    Hospital Course:  2/17: POD# 0 S/P Cranioplasty, with bone flap replacement  2/18: POD#1, WILMA overnight, neuro exam stable, patient to be discharged home today    Patient evaluated by PT/OT and cleared for discharge home today. Patient is medically ready for discharge

## 2021-02-18 NOTE — DISCHARGE NOTE PROVIDER - NSDCFUADDAPPT_GEN_ALL_CORE_FT
Please call Dr. Fitzpatrick' office to schedule a follow up appointment if one is not provided to you upon discharge from the hospital.  Please follow up with your primary care provider. Please follow up with Dr. Fitzpatrick on 3/2 at 10AM  Please follow up with your primary care provider.

## 2021-02-18 NOTE — PHYSICAL THERAPY INITIAL EVALUATION ADULT - ADDITIONAL COMMENTS
Patient reports previously independent with all ADLs/IADLs prior to admission. No HHA. Denies history of mechanical falls. Wears visual aides for distance. (R)hand dominant. Patient endorses friend will be staying with her to assist as needed upon discharge from [facility].

## 2021-02-18 NOTE — DISCHARGE NOTE PROVIDER - NSDCACTIVITY_GEN_ALL_CORE
Do not drive or operate machinery/Showering allowed/Do not make important decisions/Walking - Indoors allowed/Walking - Outdoors allowed

## 2021-02-18 NOTE — DISCHARGE NOTE PROVIDER - NSDCCPTREATMENT_GEN_ALL_CORE_FT
PRINCIPAL PROCEDURE  Procedure: Cranioplasty, with bone flap replacement  Findings and Treatment: Right

## 2021-02-18 NOTE — PHYSICAL THERAPY INITIAL EVALUATION ADULT - PERTINENT HX OF CURRENT PROBLEM, REHAB EVAL
49 y/o female presents for cranioplasty today.  Her history includes right craniotomy for brain biopsy 2/2019 with pathology high grade glioma, treated with radiation and temozolamide in [place] in 2019, developed pancytopenia/aplastic anemia.  She had drainage from incision and underwent washout 10/2019 which grew staph capitis and epidermis, she was treated with IV antibiotics. Please refer to H&P on [place] for remaining.

## 2021-02-18 NOTE — DISCHARGE NOTE PROVIDER - CARE PROVIDER_API CALL
Byron Fitzpatrick)  Neurosurgery  [address], [place] [facility]  Phone: [phone]  Fax: [phone]  Follow Up Time:

## 2021-02-18 NOTE — PHYSICAL THERAPY INITIAL EVALUATION ADULT - THERAPY FREQUENCY, PT EVAL
Patient educated on discharge from inpatient PT at [facility], patient verbalized understanding.

## 2021-02-18 NOTE — DISCHARGE NOTE PROVIDER - NSDCFUADDINST_GEN_ALL_CORE_FT
fatigue is normal post cranial surgery. Rest if you are tired. You may have intermittent HA. You are prescribed narcotic pain reliever, do not take alcohol while on them.   call office immediately upon discharge to make follow up appointment and wound check.   Keep incision clean and dry, open to air. you can shampoo and wash incision. Contact office if you have Increasing headache  Fever, Seizures Swelling or infection in the wound, Fluid leaking from the wound;   You should walk around home as much as tolerated.     Important: DO NOT DRIVE until your doctor says you are ready. You will be assessed a week or two after your surgery to determine if it’s okay for you to drive. Neurosurgery follow up appointment date/time: 3/2 at 10AM  - you have staples in place which will be removed at that time  - please call the office to confirm appointment: [phone]     Wound Care:  - keep incision dry  - you may shower starting on SUNDAY 2/21  - keep incision open to air, no dressing is needed    Activity:  - fatigue is common after surgery, rest if you feel tired   - no bending, lifting, twisting or heavy lifting   - walking is recommended, ambulate as tolerated  - you may shower when you get home, keep your incision dry  - no bathing   - no driving within 24 hours of anesthesia or while taking prescription pain medications   - keep hydrated, drink plenty of water      Please also follow up with your primary care doctor     Pain Expectations:  - pain after surgery is expected  - please take pain meds as prescribed (Tylenol or Percocet as needed)    Medications:  - no changes to home meds, continue your multivitamin  - you may take tylenol or percocet as needed for pain  - pain medications can cause constipation, you should eat a high fiber diet and may take a stool softener while on pain meds   - Avoid taking Advil (ibuprofen), Motrin (naproxen), or Aspirin for pain as they can cause bleeding     Call the office or come to ED if:  - wound has drainage or bleeding, increased redness or pain at incision site, neurological change, fever (>101), chills, night sweats, syncopy, nausea/vomiting          PLEASE CALL THE OFFICE WITH ANY QUESTIONS OR CONCERNS: [phone]

## 2021-02-18 NOTE — CHART NOTE - NSCHARTNOTEFT_GEN_A_CORE
Neurosurgical Indications for Screening Dopplers on Admission:       1) Known hypercoagulation disorder (h/o VTE, thrombophilia, HIT, etc.)   2) Admitted from prolonged stay from another institution (straight forward ER transfers not included)  3) Presenting with significant leg immobility   4) Presenting with signs and symptoms of VTE?    5) With significant critical illness, Including "found down" for unknown period of time in HPI  6) With significant neurotrauma (TBI, SCI / TLS spine fractures)   7) Who are comatose   8) With known malignancy (e.g. glioblastoma multiforme, meningioma, etc.). Excludes IA chemo 23hr observation stays  9) On hemodialysis   10) Who have received platelet transfusion or antithrombotic reversal agents recently   11) Who have had recent major orthopedic surgery          Screening dopplers indicated?   Y    N x    DVT Prophylaxis:  x SCD's   _ chemoprophylaxis

## 2021-02-18 NOTE — DISCHARGE NOTE NURSING/CASE MANAGEMENT/SOCIAL WORK - PATIENT PORTAL LINK FT
You can access the FollowMyHealth Patient Portal offered by [facility] by registering at the following website: [email or web address]. By joining [facility]’s FollowMyHealth portal, you will also be able to view your health information using other applications (apps) compatible with our system.

## 2021-02-18 NOTE — PROGRESS NOTE ADULT - SUBJECTIVE AND OBJECTIVE BOX
HPI:  51 y/o female presents for cranioplasty today.  Her history includes right craniotomy for brain biopsy 2/2019 with pathology high grade glioma, treated with radiation and temozolamide in [place] in 2019, developed pancytopenia/aplastic anemia.  She had drainage from incision and underwent washout 10/2019 which grew staph capitis and epidermis, she was treated with IV antibiotics.  She underwent debridement and craniectomy 1/30/2020 for intracranial abscess and continued on IV antibiotics.  She continues with pancytopenia and follows with both Dr. Yuliet Patel at [facility] for onc and Dr. Britta Head for heme.  She denies drainage, redness from the incision site, fevers, chills. (17 Feb 2021 06:44)    Hospital Course:  2/17: POD# 0 S/P R Cranioplasty, with bone flap replacement  2/18: POD#1, WILMA overnight, neuro exam stable    Vital Signs Last 24 Hrs  T(C): 36.6 (17 Feb 2021 21:45), Max: 36.6 (17 Feb 2021 10:13)  T(F): 97.8 (17 Feb 2021 21:45), Max: 97.9 (17 Feb 2021 10:13)  HR: 58 (18 Feb 2021 00:15) (53 - 77)  BP: 89/50 (18 Feb 2021 00:15) (85/48 - 103/63)  BP(mean): 64 (18 Feb 2021 00:15) (61 - 78)  RR: 18 (18 Feb 2021 00:15) (11 - 20)  SpO2: 100% (18 Feb 2021 00:15) (99% - 100%)    I&O's Summary    17 Feb 2021 07:01  -  18 Feb 2021 01:47  --------------------------------------------------------  IN: 450 mL / OUT: 1900 mL / NET: -1450 mL        PHYSICAL EXAM:  General: patient seen laying supine in bed in NAD  Neuro: AAOx3, FC, OE spontaneously, speech clear and fluent, CNII-XI grossly intact, face symmetric, no pronator drift, strength 5/5 b/l UE and LE, SILT throughout  HEENT: PERRL, EOMI  Neck: supple  Cardiac: RRR, S1S2  Pulmonary: chest rise symmetric  Abdomen: soft, nontender, nondistended  Ext: warm, perfusing well  Wound: R cranioplasty incision dressing with small area of dried blood, c/d/i, dressing stapled to scalp        TUBES/LINES:  [] Elliott  [] Lumbar Drain  [] Wound Drains  [] Others      DIET:  [] NPO  [x] Mechanical  [] Tube feeds    LABS:                        8.6    6.69  )-----------( 125      ( 17 Feb 2021 10:25 )             26.5     02-17    140  |  107  |  20  ----------------------------<  84  3.9   |  24  |  0.72    Ca    9.7      17 Feb 2021 06:57    TPro  6.9  /  Alb  4.2  /  TBili  0.8  /  DBili  x   /  AST  17  /  ALT  15  /  AlkPhos  85  02-17    PT/INR - ( 17 Feb 2021 06:57 )   PT: 12.7 sec;   INR: 1.06          PTT - ( 17 Feb 2021 06:57 )  PTT:31.4 sec        CAPILLARY BLOOD GLUCOSE          Drug Levels: [] N/A    CSF Analysis: [] N/A      Allergies    penicillins (Unknown)  sulfa drugs (Unknown)    Intolerances      MEDICATIONS:  Antibiotics:  ceFAZolin   IVPB 2000 milliGRAM(s) IV Intermittent every 8 hours    Neuro:  acetaminophen   Tablet .. 650 milliGRAM(s) Oral every 6 hours PRN  metoclopramide Injectable 10 milliGRAM(s) IV Push every 8 hours PRN  ondansetron Injectable 4 milliGRAM(s) IV Push every 6 hours PRN  oxycodone    5 mG/acetaminophen 325 mG 1 Tablet(s) Oral every 4 hours PRN  traMADol 50 milliGRAM(s) Oral every 6 hours PRN    Anticoagulation:    OTHER:  senna 2 Tablet(s) Oral at bedtime    IVF:  multivitamin 1 Tablet(s) Oral daily  sodium chloride 0.9%. 1000 milliLiter(s) IV Continuous <Continuous>    CULTURES:    RADIOLOGY & ADDITIONAL TESTS:      ASSESSMENT:  50yFemale w/ pmhx of high grade glioma dx 2019 s/p crani/bx, with intracranial abscess s/p 1/31/2020 Rt craniectomy and debridement, with pancytopenia/aplastic anemia, s/p R cranioplasty, with bone flap replacement (2/17).     M95.2    No pertinent family history in first degree relatives    Handoff    MEWS Score    Pancytopenia    Intracranial abscess and granuloma    Brain mass    Fibroids    No pertinent past medical history    Acquired skull defect    Acquired skull defect    Cranioplasty, with bone flap replacement    Acquired skull defect    Cranioplasty, with bone flap replacement    History of surgery    H/O hysterectomy with unilateral oophorectomy    No significant past surgical history    No significant past surgical history    SysAdmin_VstLnk        PLAN:    -Neurochecks q4h  -Vital checks q4h  - pain control  - postop CTH completed 2/17  -Normotensive BP goals  -Satting well on RA, continue Incentive spirometry  -Advance diet as tolerated  -OOB/PT as tolerated  -ANCEF 2 g IV while admitted for chronic neutropenia  -2 Units Platelets transfused Intra-Op, CBC post-op platelets 125, f/u AM level  - SCDs for DVT ppx    Disposition: full code, SDU status, plan for home today    D/w Dr. Fitpzatrick      Assessment:  Present when checked    []  GCS  E   V  M     Heart Failure: []Acute, [] acute on chronic , []chronic  Heart Failure:  [] Diastolic (HFpEF), [] Systolic (HFrEF), []Combined (HFpEF and HFrEF), [] RHF, [] Pulm HTN, [] Other    [] IRMA, [] ATN, [] AIN, [] other  [] CKD1, [] CKD2, [] CKD 3, [] CKD 4, [] CKD 5, []ESRD    Encephalopathy: [] Metabolic, [] Hepatic, [] toxic, [] Neurological, [] Other    Abnormal Nurtitional Status: [] malnurtition (see nutrition note), [ ]underweight: BMI < 19, [] morbid obesity: BMI >40, [] Cachexia    [] Sepsis  [] hypovolemic shock,[] cardiogenic shock, [] hemorrhagic shock, [] neuogenic shock  [] Acute Respiratory Failure  []Cerebral edema, [] Brain compression/ herniation,   [] Functional quadriplegia  [] Acute blood loss anemia

## 2021-02-18 NOTE — PHYSICAL THERAPY INITIAL EVALUATION ADULT - GAIT DEVIATIONS NOTED, PT EVAL
appropriate luke/step length, steady gait, no LOB/knee buckling noted, good negotiation through hallway obstacles without gait disturbances noted

## 2021-02-18 NOTE — DISCHARGE NOTE PROVIDER - NSDCMRMEDTOKEN_GEN_ALL_CORE_FT
Multi Vitamin+:    acetaminophen 325 mg oral tablet: 2 tab(s) orally every 6 hours, As needed, Temp greater or equal to 38C (100.4F), Mild Pain (1 - 3)  Multi Vitamin+:   oxycodone-acetaminophen 5 mg-325 mg oral tablet: 1 tab(s) orally every 6 hours, As Needed -Severe Pain (7 - 10) - for severe pain MDD:4 tabs

## 2021-02-18 NOTE — DISCHARGE NOTE PROVIDER - NSDCCPCAREPLAN_GEN_ALL_CORE_FT
PRINCIPAL DISCHARGE DIAGNOSIS  Diagnosis: Acquired skull defect  Assessment and Plan of Treatment:

## 2021-02-22 DIAGNOSIS — D61.818 OTHER PANCYTOPENIA: ICD-10-CM

## 2021-02-22 DIAGNOSIS — Z42.8 ENCOUNTER FOR OTHER PLASTIC AND RECONSTRUCTIVE SURGERY FOLLOWING MEDICAL PROCEDURE OR HEALED INJURY: ICD-10-CM

## 2021-02-22 DIAGNOSIS — D70.9 NEUTROPENIA, UNSPECIFIED: ICD-10-CM

## 2021-02-22 DIAGNOSIS — G96.08 OTHER CRANIAL CEREBROSPINAL FLUID LEAK: ICD-10-CM

## 2021-03-02 ENCOUNTER — APPOINTMENT (OUTPATIENT)
Dept: NEUROSURGERY | Facility: CLINIC | Age: 51
End: 2021-03-02
Payer: COMMERCIAL

## 2021-03-02 VITALS
OXYGEN SATURATION: 96 % | WEIGHT: 187 LBS | DIASTOLIC BLOOD PRESSURE: 75 MMHG | HEIGHT: 69 IN | HEART RATE: 89 BPM | TEMPERATURE: 98.1 F | RESPIRATION RATE: 18 BRPM | BODY MASS INDEX: 27.7 KG/M2 | SYSTOLIC BLOOD PRESSURE: 111 MMHG

## 2021-03-02 DIAGNOSIS — Z51.89 ENCOUNTER FOR OTHER SPECIFIED AFTERCARE: ICD-10-CM

## 2021-03-02 DIAGNOSIS — Z09 ENCOUNTER FOR FOLLOW-UP EXAMINATION AFTER COMPLETED TREATMENT FOR CONDITIONS OTHER THAN MALIGNANT NEOPLASM: ICD-10-CM

## 2021-03-02 PROCEDURE — 99024 POSTOP FOLLOW-UP VISIT: CPT

## 2021-03-02 NOTE — PHYSICAL EXAM
[General Appearance - Alert] : alert [General Appearance - In No Acute Distress] : in no acute distress [Clean] : clean [Dry] : dry [Intact] : intact [No Drainage] : without drainage [Normal Skin] : normal [Oriented To Time, Place, And Person] : oriented to person, place, and time [Motor Tone] : muscle tone was normal in all four extremities [Motor Strength] : muscle strength was normal in all four extremities [Sclera] : the sclera and conjunctiva were normal [Outer Ear] : the ears and nose were normal in appearance [Neck Appearance] : the appearance of the neck was normal [] : no respiratory distress [Abnormal Walk] : normal gait [Skin Color & Pigmentation] : normal skin color and pigmentation [FreeTextEntry1] : RIGHT frontal

## 2021-03-02 NOTE — HISTORY OF PRESENT ILLNESS
[FreeTextEntry1] : 49 year old woman who presented with severe headaches in January 2018 and workup demonstrated brain mass.\par \par She underwent biopsy of brain mass on 2/1/19 and pathology indicated high grade glioma.\par \par She was undergoing chemotherapy  closer to her family in [place]. Her last treatment was in March 2019. She returned to [place] after this treatment and has since been followed at [facility] with Dr. Yuliet Patel.\par \par She presented to [facility] ED on 10/23/19 with purulent drainage and dehiscence from previous right frontal cranial incision site. 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 \par \par She was seen in the office on 1/17/2020 due to nonhealing surgical incision and wound drainage. It was recommended she have craniectomy and wound washout with Dr. Héctor Medellin. She underwent wound washout and RIGHT frontal craniectomy on 1/31/2020.

## 2021-03-02 NOTE — REASON FOR VISIT
[de-identified] : Right skull defect acquired after craniectomy, defect in the dura with cerebrospinal fluid leak [de-identified] : 2/17/21 [de-identified] : \par Reports she is doing well.\par Denies any signs of postop wound infection which could include but is not limited to redness/swelling/purulent drainage\par Denies CP/SOB/unilateral leg edema. Denies headaches, nausea, vomiting, dizziness, weakness. \par She is slowly introducing preop activities. She would like to return to work on 3/16/21.\par

## 2021-03-02 NOTE — ASSESSMENT
[FreeTextEntry1] : Shower daily. Wash hair with mild shampoo, i.e. Cordell and Cordell.  Pat incision line dry with dry separate towel.\par Report all s/s infection including drainage, redness, warmth, fever, weakness, chills.\par Follow-up with Dr. Fitzpatrick in one month for post-op visit.\par No tub baths/pools for 8 weeks.\par Keep incision covered and protected from sun for 3-6 months following surgery.\par Can return to work with no restrictions on 3/16/21.\par \par Patient verbalizes agreement and understanding with plan.\par \par Dr. Fitzpatrick present for visit and agrees with above plan.

## 2021-04-09 ENCOUNTER — APPOINTMENT (OUTPATIENT)
Dept: NEUROSURGERY | Facility: CLINIC | Age: 51
End: 2021-04-09
Payer: COMMERCIAL

## 2021-04-09 VITALS
DIASTOLIC BLOOD PRESSURE: 70 MMHG | BODY MASS INDEX: 28.04 KG/M2 | WEIGHT: 185 LBS | RESPIRATION RATE: 12 BRPM | SYSTOLIC BLOOD PRESSURE: 95 MMHG | HEART RATE: 68 BPM | OXYGEN SATURATION: 96 % | TEMPERATURE: 97.1 F | HEIGHT: 68 IN

## 2021-04-09 PROCEDURE — 99024 POSTOP FOLLOW-UP VISIT: CPT

## 2021-04-09 RX ORDER — GABAPENTIN 300 MG/1
300 CAPSULE ORAL
Qty: 30 | Refills: 0 | Status: DISCONTINUED | COMMUNITY
Start: 2021-02-05 | End: 2021-04-09

## 2021-04-09 RX ORDER — ERGOCALCIFEROL 1.25 MG/1
1.25 MG CAPSULE ORAL
Qty: 4 | Refills: 0 | Status: DISCONTINUED | COMMUNITY
Start: 2019-11-20 | End: 2021-04-09

## 2021-04-09 RX ORDER — SENNOSIDES 8.6 MG TABLETS 8.6 MG/1
TABLET ORAL
Refills: 0 | Status: DISCONTINUED | COMMUNITY
End: 2021-04-09

## 2021-04-09 RX ORDER — OXYCODONE AND ACETAMINOPHEN 5; 325 MG/1; MG/1
5-325 TABLET ORAL
Qty: 12 | Refills: 0 | Status: DISCONTINUED | COMMUNITY
Start: 2021-02-18 | End: 2021-04-09

## 2021-04-12 NOTE — REASON FOR VISIT
[Follow-Up: _____] : a [unfilled] follow-up visit [FreeTextEntry1] : \par Returns for one month follow up s/p cranioplasty. She is doing well with no signs of infection at the cranial incision site. She denies headaches, fever, N/V, dizziness, focal weakness. \par \par She is back to work.\par She sees oncologist, Dr. Yuliet Patel at [facility] and gets Q3 month MRI. She is pending one in May. [de-identified] : \par

## 2021-04-12 NOTE — HISTORY OF PRESENT ILLNESS
[FreeTextEntry1] : 49 year old woman who presented with severe headaches in January 2018 and workup demonstrated brain mass.\par \par She underwent biopsy of brain mass on 2/1/19 and pathology indicated high grade glioma.\par \par She was undergoing chemotherapy  closer to her family in [place]. Her last treatment was in March 2019. She returned to [place] after this treatment and has since been followed at [facility] with Dr. Yuliet Patel.\par \par She presented to [facility] ED on 10/23/19 with purulent drainage and dehiscence from previous right frontal cranial incision site. 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 \par \par She was seen in the office on 1/17/2020 due to nonhealing surgical incision and wound drainage. It was recommended she have craniectomy and wound washout with Dr. Héctor Medellin. She underwent wound washout and RIGHT frontal craniectomy on 1/31/2020.

## 2021-04-12 NOTE — ADDENDUM
[FreeTextEntry1] : 2021 \par  \par OFFICE FOLLOWUP NOTE \par  \par  \par Re:	Yolanda Montes  \par :	70 \par MRN:  [id number] \par  \par Ms. Montes is a 50-year-old woman who underwent right frontal cranioplasty on 2021. She has done quite well since this surgery. Her wound has healed up very well. She is back to all her usual activities. She has no complaints at this time. She will follow up with me after her next brain MRI which is generally performed every 3 months, and ordered by her oncologist at [facility]. She will contact me if there are any issues prior to that. \par  \par  \par  \par  \par Byron Fitzpatrick M.D. \par  of Neurosurgery \par [facility] \[facility] Department of Neurosurgery \par [facility] \par [address] \par [facility], Third Floor \par [place] [id number] \par Tel: [phone] \[facility] Email:  [email or web address] \par  \par Dictated but not read. \par  \par DAVID/[employer] #[id number] \par

## 2021-04-12 NOTE — ASSESSMENT
[FreeTextEntry1] : Patient doing well with a healed frontal head incision. She is pending follow up MRI with her oncologist at [facility]. She is to follow up with Dr. Fitzpatrick after the MRI to review the image.\par \par

## 2021-04-12 NOTE — PHYSICAL EXAM
[General Appearance - Alert] : alert [General Appearance - In No Acute Distress] : in no acute distress [Well-Healed] : well-healed [Intact] : intact [No Drainage] : without drainage [Normal Skin] : normal [Normal Skin Turgor] : skin turgor was normal [Oriented To Time, Place, And Person] : oriented to person, place, and time [Impaired Insight] : insight and judgment were intact [Neck Appearance] : the appearance of the neck was normal [] : no respiratory distress [Respiration, Rhythm And Depth] : normal respiratory rhythm and effort [Exaggerated Use Of Accessory Muscles For Inspiration] : no accessory muscle use [Abnormal Walk] : normal gait [FreeTextEntry1] : frontal incision healed, CDI

## 2021-06-16 NOTE — PATIENT PROFILE ADULT - NSPROMEDSADMININFO_GEN_A_NUR
Lab Results   Component Value Date    EGFR 53 06/15/2021    EGFR 67 10/01/2020    EGFR 69 09/30/2020    CREATININE 1 26 06/15/2021    CREATININE 1 05 10/01/2020    CREATININE 1 02 09/30/2020     Cr is at BL, 1 2   Diuretics on hold in setting of pna, sepsis, IVF resuscitation no concerns

## 2021-08-25 NOTE — DISCHARGE NOTE ADULT - CONTRAINDICATIONS & PRECAUTIONS (SELECT ALL THAT APPLY)
Pts daughter Mel called and left voice mail.She has questions regarding pts PET and CT scan. PET done 8/24. CT in progress now. Neither was ordered by Dr Lovett. Pt does have f/u visit with Dr Lovett on 8/31/21. Called and left voice mail for return call.    Patient/surrogate refused vaccine...

## 2022-05-17 NOTE — PATIENT PROFILE ADULT - HAS THE PATIENT EXPERIENCED ANY OF THE FOLLOWING WITHIN THE WEEK PRIOR TO ADMISSION?
[facility] ([facility]) Hospitalist Daily Progress Note    Patient Name: Stefan Stauffer   Date: 05/17/22  Hospital Day: Hospital Day: 17    Subjective and Overnight Events:  No acute overnight events. Patient seen and examined.   Patient failed home O2 eval yesterday. Required up to 15 with activity. He reports he is feeling \"so so\". Still conversationally dyspneic as well as with worsening GONZALES. Denies any other new complaints.     12 point ROS reviewed and negative except as stated above.     Past medical history, surgical history, family history, social history reviewed.     Inpatient Medications:  • furosemide  40 mg Intravenous Daily   • insulin lispro   Subcutaneous TID WC   • insulin lispro   Subcutaneous Nightly   • atorvastatin  10 mg Oral Nightly   • finasteride  5 mg Oral Q Evening   • [Held by provider] brimonidine  1 drop Left Eye TID   • dorzolamide  1 drop Left Eye TID   • latanoprost  1 drop Both Eyes Nightly   • budesonide  0.5 mg Nebulization BID Resp   • dofetilide  250 mcg Oral 2 times per day   • ondansetron (ZOFRAN) parenteral  4 mg Intravenous Once   • ipratropium-albuterol  3 mL Nebulization 4x Daily Resp   • apixaBAN  5 mg Per NG tube 2 times per day   • polyethylene glycol  17 g Per NG tube Daily   • sodium chloride (PF)  2 mL Intracatheter 2 times per day   • Potassium Standard Replacement Protocol   Does not apply See Admin Instructions   • Magnesium Standard Replacement Protocol   Does not apply See Admin Instructions   • Phosphorus Standard Replacement Protocol   Does not apply See Admin Instructions      acetaminophen, melatonin, dextrose, dextrose, glucagon, sodium chloride, sodium chloride         Physical Exam:  Vital Signs:    Vital Last Value 24 Hour Range   Temperature 98.1 °F (36.7 °C) (05/16/22 2246) Temp  Min: 98.1 °F (36.7 °C)  Max: 98.1 °F (36.7 °C)   Pulse 79 (05/17/22 0827) Pulse  Min: 72  Max: 80   Respiratory 16 (05/17/22 0522) Resp  Min: 16  Max: 18    Non-Invasive  Blood Pressure 102/56 (05/17/22 0827) BP  Min: 102/56  Max: 124/70   Pulse Oximetry 98 % (05/17/22 0827) SpO2  Min: 97 %  Max: 98 %     Vital Today Admitted   Weight 107.5 kg (236 lb 15.9 oz) (05/17/22 0522) Weight: 112 kg (247 lb) (05/01/22 1957)   Height N/A Height: 6' (182.9 cm) (05/01/22 1957)   Body Mass Index N/A BMI (Calculated): 33.5 (05/01/22 1957)     Intake/Output:      Intake/Output Summary (Last 24 hours) at 5/17/2022 0940  Last data filed at 5/17/2022 0500  Gross per 24 hour   Intake 850 ml   Output 1630 ml   Net -780 ml        Physical Exam:  General: No acute distress, alert & oriented x3, obese. Conversationally dyspneic.   Eyes: PERRLA, anicteric, EOMI   HENT: Normocephalic, oral mucosa is moist.  Neck: Supple, non-tender, no jugular vein distention  Respiratory: bilateral rales,   Cardiovascular: Normal rate, regular rhythm, no murmur.  Gastrointestinal : Soft, non-tender, no obvious distension, no organomegly, active bowel sounds.  Musculoskeletal: No tenderness, no swelling, no deformity,  BL LE pitting edema.   Integumentary: Warm, dry, well perfused, intact, no jaundice.  Neurologic: normal sensory, normal motor function, no obvious focal deficits.  Psychiatric: Appropriate, normal mood and affect.      Labs:  Recent Labs     05/15/22  0535 05/16/22  0531 05/17/22  0501   WBC 3.9* 2.9* 3.2*   RBC 2.83* 2.87* 2.96*   HGB 9.2* 9.6* 9.7*   HCT 28.9* 29.1* 30.1*    154 152   .1* 101.4* 101.7*   MCH 32.5 33.4 32.8   MCHC 31.8* 33.0 32.2   NRBCRE 0 0 0         Recent Labs     05/15/22  0535 05/16/22 0531 05/17/22  0501   SODIUM 137 138 140   POTASSIUM 4.1 4.1 4.1   MG 2.0 2.1 2.0   PHOS 3.0 3.4 3.1   CO2 35* 34* 33*   ANIONGAP 5* 7* 7*   GLUCOSE 91 84 91   BUN 16 17 16   CREATININE 0.56* 0.53* 0.52*   BCRAT 29* 32* 31*   CALCIUM 9.0 9.0 9.1   BILIRUBIN 0.5 0.5 0.4   AST 37 43* 41*   GPT 31 30 32   ALKPT 84 86 93   GLOB 3.0 3.1 2.9   AGR 0.7* 0.7* 0.8*        No results  found     No results for input(s): INR, PT, PTT in the last 72 hours.      Imaging:  XR CHEST AP OR PA 1 VIEW  Narrative: Procedure: XR CHEST PA OR AP 1 VIEW    COMPARISON: 05/12/2022.    INDICATIONS: Pneumonia, enlarged cardiac size    TECHNIQUES: Portable chest x-ray at 0639 hours were obtained.  Impression: RESULTS/IMPRESSION: Again noted is mildly large cardiac size and ectatic  aorta.  Lungs are slightly under aerated.  The there is redemonstration of  mild to moderate right lung and left lower lung reticulonodular  infiltration.  Small pleural effusions are greater on the right.  There is  no pneumothorax.    Electronically Signed by: WILMA SHEPHERD MD   Signed on: 5/16/2022 7:16 AM          Cultures:    Microbiology Results     None            Assessment and Plan:  Stefan Stauffer is a 84 year old male with significant medical history of diabetes mellitus type 2, hypertension, chronic atrial fibrillation, COPD, diastolic heart failure, non-Hodgkin lymphoma, recurrent right pleural effusions, PE on anticoagulation sleep apnea who presented due to shortness of breath     •Severe sepsis  •Influenza and pneumonia    •MRSA infection  •Acute on chronic respiratory failure with hypoxia   -Patient with tachypnea, hypoxia, lactic acidosis and respiratory failure consistent with severe sepsis  -He required mechanical ventilation upon admission and extubated 5/3/2022  -Completed oseltamivir  -He has been transferred out of the ICU currently on nasal cannula  -Chest imaging consistent with pneumonia viral in nature.  He has also been initiated on ceftriaxone and doxycycline.  -Blood cultures negative.  Repeat blood cultures taken 5/12 also no growth.  -MRSA sputum sensitive to doxycycline based on susceptibility (doxycycline now discontinued as he completed a full course).  -Appreciate pulmonary recommendation  - IV diuretics.  Continue his IV diuretics as his blood pressure allows.  -5/12 patient had rapid response for  respiratory distress, antibiotics temporarily changed to Zosyn overnight, however given that he had MRSA in his sputum we will change back to doxycycline.  Procalcitonin negative 5/13. should he have a decline in status we could start vancomycin.  -Home oxygen assessment attempted on 5/16 however he was too dyspneic to complete the ambulatory portion. Required up to 15L with exertion. Stable at 4L while at rest.   -CT chest reevaluation      •Chronic atrial fibrillation   -Appreciate EP cardiology recommendations he is is receiving dofetilide  -Continue Eliquis  -Currently rate controlled  -Planning for cardioversion once his oxygen requirements are improved.               •Large cell  non-Hodgkin's lymphoma        •Small pleural effusion                     -He follows as an outpatient  -He has received 5 cycles of chemotherapy  -Blood counts are stable  -•Anemia, chronic disease       •Chronic diastolic heart failure  -BnP normal upon admission  -diuretics     •COPD            •OLEGARIO (obstructive sleep apnea)      -Continue Pulmicort  -Continue supplemental ox  -DuoNeb  -Pulmonology is consulted     •Hypertension            •Pure hypercholesterolemia            Continue atorvastatin               •History of pulmonary embolus   -Currently on Eliquis      Chronic Disease:  Past Medical History:   Diagnosis Date   • Activity intolerance 05/2021    < 4 METS; MODERATE SOB WITH ACTIVITY    • Arthritis    • Chronic atrial fibrillation (CMS/HCC)    • Congestive cardiac failure (CMS/HCC)     Chronic diastolic heart failure   • COPD (chronic obstructive pulmonary disease) (CMS/HCC)    • Essential (primary) hypertension    • Hyperlipidemia    • Malignant neoplasm (CMS/HCC) 2021    NON HODGKINS LYMPHOMA   • Pancreatitis    • Sleep apnea     USES CPAP         FEN: Replete electrolytes as needed to maintain Mg 2, Phos 3, K 4 per protocol. 2 Times/day W Lunch & Dinner; Ensure Enlive/standard Oral Supplement, Vanilla Oral  Nutrition Supplement  Cardiac, Fluid Restrict 1500ml (900 From Dietary) Diet   DVT Proph: SCD's, Encourage Ambulation with assistance,   Current Active Medications for DVT Prophylaxis (From admission, onward)         Stop     apixaBAN (ELIQUIS) tablet 5 mg  5 mg,   Per NG tube,   2 times per day         --               Code Status:   Code Status: Full Resuscitation   Primary Care Provider: Alondra Hidalgo MD  Dispo: Inpatient       Greater than 35 minutes were spent in the independent review of imaging studies, laboratory results, interacting with patient, discussing patient with other providers and updating the patient and the other members of the care team.     This note was created with the help of Dragon voice recognition. Errors in content may be related to improper recognition by the system; efforts to review and correct have been done but errors may still exist.    Louie Pang, DO            no

## 2022-12-22 NOTE — PATIENT PROFILE ADULT - NSPRESCRALCSIXMORE_GEN_A_NUR
Called patient today to inform her that taking these given medications is okay to take with her current medications LVM Never

## 2023-01-04 NOTE — ED ADULT NURSE NOTE - CAS EDN DISCHARGE ASSESSMENT
Hepatology Progress Note    Date:  1/4/2023  Referring Provider:  Lacey Lopez MD  CC/Reason for Consult:  Cirrhosis    Subjective: Patient reports feeling OK, still having swelling behind thighs and in lower legs. No abdominal pain.    HPI:  Carlo Johnson is a 44 year old male with no significant PMHx.    Presented to ED 12/31 for weakness and LE edema. Some SOB and fevers. RSV positive. Elevated LFTs.   Paracentesis done 12/31 positive for SBP by cell count. Started on Rocephin in ED.   Hepatology was asked to see the patient in consult / transfer of care for recommendations. LFTs elevated. Endorses drinking daily \"his entire life\", at least 5 vodka shots daily. Last drink PTA. Never been diagnosed with liver disease or cirrhosis previously. Patient is Unemployed and has no insurance on file.    Patient Active Problem List    Diagnosis Date Noted   • Alcoholic liver disease (CMS/HCC) 12/31/2022     Priority: Low   • Abdominal pain in male 12/31/2022     Priority: Low     Objective:    Vitals:    01/04/23 0825   BP: 128/74   Pulse: 97   Resp: 18   Temp: 99.1 °F (37.3 °C)     Physical Exam   General- Awake/alert in NAD. Calm/cooperative. Well nourished.  Skin- Warm and dry to touch. No rashes/lesions. + jaundice.   Eyes- + scleral icterus. EOMs intact.   Pulmonary- On RA. Normal Respiratory Effort.  Abdomen- Round, non tender. Non-distended.   Extremities- No erythema. + Significant pitting LE edema, behind thighs and into lower legs.   Neuro-  No focal deficits. Strength and sensation grossly intact. Speech clear. No asterixis.  Psych- Alert and oriented. Mood and affect appropriate.     Imaging/Diagnostics: Reviewed    Assessment/Plan:    1. Acute EtOH Hepatitis with underlying EtOH Cirrhosis. MELD-NA 25  -- Last EtOH use PTA. Complete EtOH abstinence encouraged.   - Continue MVI, folate, and thiamine.   -- Maddrey Score: 57.06 at 1/4/2023  9:19 AM   - Defer steroids due to SBP  -- Coagulopathy. Give Vit K 10  mg IV x 3 days.    -- Patient is not a transplant candidate at this time due to psychosocial concerns. Unfortunately there is no insurance on file.     2. Portal Hypertension/ Ascites/Spontaneous Bacterial Peritonitis.  -- Paracentesis positive by cell count 12/31 (70009 nucleated cells with 84% neutrophils)  - Ascites culture + for E.Coli.  - Continue Rocephin x 5 days (EOT 1/5/23)  - Recieved SPA to prevent HRS  - Repeat LVP 1/3/23 with improved cell count (4,167 cells with 88% neutrophils)  - Will need lifelong ppx, Cipro or Bactrim DS  -- Lasix gtt 5 mg/hr and Aldactone 100 mg QD   - Weight improving, good UOP  -- 2g Na Diet.     3. Portal HTN / Esophageal Variceal screening. Needs EGD non urgent.    4. Hepatic Encephalopathy. Grade 0  -- HE is chronic, secondary to cirrhosis, and without hepatic coma     -- Start Lactulose and Rifaximin.     5. HCC Screening   -- AFP 3 1/2/23   -- US liver with doppler 1/2/23 with No focal hepatic mass    Plan discussed with patient via .    Mavis Gonzales PA-C  Ab[facility] Trans[facility] and Hepatology  Pager: [phone]  01/04/23    After 3:00 pm, please call the answering service and page the [facility] on call for the service     Patient has been evaluated. Please see above notes by the [facility] that I have reviewed and agree with the above documentation completed. I personally spent the majority time on the medical decision making.      Stephanie Nix MD   Alert and oriented to person, place and time/Patient baseline mental status

## 2023-02-23 NOTE — H&P ADULT - NSCORESITESY/N_GEN_A_CORE_RD
Patient has a history of oopherectomy in 2011, hysterectomy 2005, cervical cancer s/p multiple procedures, meniere disease, cholecystectomy in 2000, fundoplication 6/2020 reflux in 3/2021- EGD w/ DIL blew out the fundoplciation, fundoplication 12/2021 (Dr. Varghese- [facility]), recurrent H pylori who presents for follow up  Surgeon: Dr. Varghese- [facility]  GI Hx: Recent ER 7/25/22 for R flank pain radiate into RL pelvis, CT A/P with hepatic steatosis, otherwise no acute findings, Pelvic U/S normal, D/C on reglan/naproxen?  11/2022- She states that every time she eats/drinks, she will get very sweaty, palpitations, salivate, dry heaving, for 30 minutes in duration. Will often times have to have a bowel movement during these episodes. She also says she is having alot of solid food dysphagia, nausea, early satiety. Cant eat alot of food because of the distension/nausea. =Vagal nerve hypersensitivity?  For her H pylori positive. Multiple treatments. Also had sensitivity testing. Supposedly referred to ID at some point but unable to clear it with medications. EGD 12/2022 with H pylori positive.  1/25/23-[facility] denied x2-3 after prior authorizations and appeals. Reordered medications individually. 2/9/23- started therapy, had yeast infection, advised to go to PCP/GYN.  2/23/23- *** GES- Barium Esophagram-  EGD: 12/16/22- LA A esophagitis, (bx: prox/distal esophagus- normal), mild esophageal spasm, toupet fundoplication seen/intact, moderate gastritis (bx: chronic H pylori gastritis, +HP), normal duodenum (bx: normal)  Colonoscopy: No records available.  Imaging/Studies/Procedures: UGI series 3/4/22- normal. No

## 2023-02-27 NOTE — PHYSICAL THERAPY INITIAL EVALUATION ADULT - STANDING BALANCE: STATIC
good balance Eucrisa Counseling: Patient may experience a mild burning sensation during topical application. Eucrisa is not approved in children less than 2 years of age.

## 2023-04-05 NOTE — OCCUPATIONAL THERAPY INITIAL EVALUATION ADULT - STANDING BALANCE: STATIC
good balance

## 2023-08-07 NOTE — H&P PST ADULT - NS PRO AD PATIENT TYPE
Rest next 24 hours, no work  Avoid exertional activity or heat exposure for the next 3 days  Plenty of fluids, Tylenol for discomfort  Seek help for your severe methamphetamine use problem   Health Care Proxy (HCP)

## 2023-08-22 NOTE — PATIENT PROFILE ADULT - DO YOU FEEL UNSAFE AT WORK?
Expected Date Of Service: 08/22/2023
Bill For Surgical Tray: no
Billing Type: Third-Party Bill
Performing Laboratory: -[id number]
no

## 2024-07-01 NOTE — PHYSICAL THERAPY INITIAL EVALUATION ADULT - SENSATION HOT/COLD, LUE, OT EVAL
Second attempt- r/s appt with Mike pt did not want to see Endo office    mild impairment/90% intensity of sensation to light touch on anteromedial forearm as compared to right

## 2024-07-05 NOTE — ED ADULT NURSE NOTE - NSFALLRSKPASTHIST_ED_ALL_ED
Pt left a message on 's VM asking for a call back to discuss possible side effects from taking Allopurinol, before starting the medication.    no

## 2025-02-09 NOTE — OCCUPATIONAL THERAPY INITIAL EVALUATION ADULT - PERTINENT HX OF CURRENT PROBLEM, REHAB EVAL
48F no pmhx presented to [facility] ER this afternoon after having headache and vision changes x1 month. Pt reports symptoms come and go, are worse when she wakes up in the morning and are temporarily relieved with advil. She states shes has also had blurry vision and has been seeing floaters in her peripheral vision. CTH showing bifrontal vasogenic edema R>L concerning for underlying mass, patient transferred to [facility] for MRI and further workup.
No